# Patient Record
Sex: FEMALE | Race: WHITE | Employment: OTHER | URBAN - METROPOLITAN AREA
[De-identification: names, ages, dates, MRNs, and addresses within clinical notes are randomized per-mention and may not be internally consistent; named-entity substitution may affect disease eponyms.]

---

## 2017-01-03 ENCOUNTER — OFFICE VISIT (OUTPATIENT)
Dept: INTERNAL MEDICINE CLINIC | Age: 82
End: 2017-01-03

## 2017-01-03 VITALS
BODY MASS INDEX: 28.16 KG/M2 | RESPIRATION RATE: 18 BRPM | OXYGEN SATURATION: 97 % | WEIGHT: 169 LBS | DIASTOLIC BLOOD PRESSURE: 83 MMHG | HEIGHT: 65 IN | HEART RATE: 72 BPM | SYSTOLIC BLOOD PRESSURE: 163 MMHG | TEMPERATURE: 97.4 F

## 2017-01-03 DIAGNOSIS — R55 SYNCOPE, UNSPECIFIED SYNCOPE TYPE: Primary | ICD-10-CM

## 2017-01-03 DIAGNOSIS — M25.512 ACUTE PAIN OF LEFT SHOULDER: Primary | ICD-10-CM

## 2017-01-03 DIAGNOSIS — M25.512 LEFT SHOULDER PAIN, UNSPECIFIED CHRONICITY: ICD-10-CM

## 2017-01-03 RX ORDER — TRAMADOL HYDROCHLORIDE 50 MG/1
50 TABLET ORAL
Qty: 180 TAB | Refills: 0 | Status: SHIPPED | OUTPATIENT
Start: 2017-01-03 | End: 2018-01-01

## 2017-01-03 RX ORDER — ESCITALOPRAM OXALATE 10 MG/1
TABLET ORAL
Qty: 90 TAB | Refills: 3 | Status: SHIPPED | OUTPATIENT
Start: 2017-01-03 | End: 2017-09-25 | Stop reason: SDUPTHER

## 2017-01-03 NOTE — PROGRESS NOTES
SUBJECTIVE  Ms. Dat Durand presents today acutely for     Chief Complaint   Patient presents with   [facility] Follow Up     pt here today [facility] fainting in the floor and vomiting, pain behind L shoulder radiating down into L arm and broken to L side       She was admitted for syncopal episode:     Ms. Dat Durand is a patient of mine who presented with the problems above. See the initial H and P for full details.      Breana Miramontes is a 80 y.o.  female with PMHx of HTN, HLD, dementia, colon CA, and arrhythmia, and PSHx of pacemaker placed, presenting per EMS to ED for evaluation of lightheadedness with two episodes of syncope PTA. Daughter states pt's caregiver reports pt c/o lightheadedness, was helped to a chair, then had GLF to the floor s/p a syncopal episode. Caregiver endorses pt then had subsequent lightheadedness followed by another syncopal episode, resulting in an additional GLF to the floor. Caregiver reports the pt hit her head during both falls. Daughter states pt has c/o left UE pain since the falls. Daughter reports pt also fell one week ago with residual left-sided facial ecchymosis but no complaints of pain, so she wasn't evaluated. Daughter endorses pt had episodes of lightheadedness prior to pacemaker ([employer]) placement. She reports pt is followed by Cardiologist, Dr. Joel De La O. Daughter denies pt has history of a hear murmur. Pt denies diarrhea, CP, SOB, and head pain.    At this time patient is lying in bed has multiple bruises in different stages, apparently fell last week and again fell today, is been having recurrent episodes of syncope, but also vomiting si possible that this may be the cause of these syncope, denies chest pain, no SOB, no fever, no diarrhea, no cough, no urinary symptoms, no other associated symptoms. We were asked to admit for work up and evaluation of the above problems.      She was admitted, and had the work up noted above. Her pacemaker was interrogated and no abnormalities noted. Echo is reportedly normal. Cardiology has seen her and suggests that work up for her syncope could include lexiscan. Likely this was vasovagal in etiology, as she was lifting a heavy object when she passed out and fell.      She has had numerous syncopal spells, and prior work up for these have been negative. In discussion with Cardiology service, we will likely need to stop her HCTZ, and allow her BP to run on the high side, in order to avoid future syncopal episodes. We will also start low dose toprol. Now, doing well, except for shoulder pain on L side, near shoulder blade. This has been painful since the collapse. Her bruising has improved. She denies SOB, CP, Palpitations. She denies any syncope, LH, dizziness. She has appt coming up with Dr. Mae Lance next week. Past Medical History   Diagnosis Date    Arthritis     Back pain 2/16/2011    Cancer ([facility] [address] [facility] 1993     colon    Chronic pain      in her back    Dementia 2011     Mild; Neuropsych testing with Dr. David Baig in 2011    Dementia 7/21/2011    Epigastric pain 2/16/2011    Essential hypertension     Essential hypertension 12/4/2015    Hyperlipidemia 2/16/2011    Hypertension 2/16/2011    Hypothyroidism 2/16/2011     s/p VALDES in 1998 and 1999 for hyperthyroidism    Neuropathy 2/16/2011    Orthostasis 7/6/2012    Orthostasis 7/6/2012    Osteoporosis 2/16/2011    Pacemaker     Persistent disorder of initiating or maintaining sleep 4/22/2015    S/P cardiac pacemaker procedure 8/24/2011    SSS (sick sinus syndrome) ([facility] [address] [facility] 8/20/2011    Syncope     Syncope and collapse 8/19/2011    Urinary frequency 2/16/2011       SH: She reports that she has never smoked. She has never used smokeless tobacco. She reports that she does not drink alcohol or use illicit drugs. ROS: Complete review of systems was performed and is otherwise unremarkable except as noted elsewhere. OBJECTIVE  Visit Vitals    /83 (BP 1 Location: Left arm, BP Patient Position: Sitting)    Pulse 72    Temp 97.4 °F (36.3 °C) (Oral)    Resp 18    Ht 5' 5\" (1.651 m)    Wt 169 lb (76.7 kg)    SpO2 97%    BMI 28.12 kg/m2     Gen: Pleasant 80 y.o.  female in NAD.   HEENT: PERRLA. EOMI. OP moist and pink.  Neck: Supple.  No LAD.  HEART: RRR, No M/G/R.   LUNGS: CTAB No W/R.   ABDOMEN: S, NT, ND, BS+.   EXTREMITIES: Warm. No C/C/E. MUSCULOSKELETAL: Normal ROM, muscle strength 5/5 all groups. NEURO: Alert and oriented x 3.  Cranial nerves grossly intact.  No focal sensory or motor deficits noted. SKIN: Warm. Dry. No rashes or other lesions noted. XR abdomen: Nonobstructive bowel gas pattern.      XR shoulder left: No acute findings.      XR L humerus and L elbow: No acute findings.      XR ribs: Anterolateral left ninth rib fracture.     CT Head: No acute findings.      CT Maxillofacial: No acute fracture.      CT cervical spine: No acute fracture or dislocation. Multilevel disc and facet degenerative change with multilevel mild to moderate canal and foraminal stenoses. There is no evidence of lytic or blastic metastasis in the cervical region.      Pacemaker interrogation: No abnormalities.      Carotid duplex: 1. <50% stenosis in the right internal carotid artery. 2. 50-69% stenosis in the left internal carotid artery (Low end of range). 3. No significant stenosis in the external carotid arteries bilaterally. ASSESSMENT / PLAN    ICD-10-CM ICD-9-CM    1. Syncope, unspecified syncope type: No events since hospital.  R55 780.2 F/u as per Dr. Caresse Severin   2. Left shoulder pain, unspecified chronicity--due to syncope and collapse M25.512 719.41 XR SCAPULA LT      REFERRAL TO ORTHOPEDIC SURGERY       I have reviewed with the patient details of the assessment and plan and all questions were answered. Relevant patient education was performed. Follow-up Disposition: Keep appt in April.

## 2017-01-03 NOTE — TELEPHONE ENCOUNTER
Script for tramadol has been faxed to [facility] at [phone], approved fax confirmation received.

## 2017-01-03 NOTE — MR AVS SNAPSHOT
Visit Information Date & Time Provider Department Dept. Phone Encounter #  
 1/3/2017 10:45 AM Blanca Sampson, [address] [place] [address] [id number] Your Appointments 1/13/2017  1:15 PM  
Follow Up with Fern Khan MD  
[facility] Cardiology Associate [address]) [address] [place] [address] [address] [facility] [address]  
[phone]  
  
   
 [address] [place] [address] [address] [facility] [address]  
  
    
 1/13/2017  2:00 PM  
ROUTINE CARE with Blanca Sampson MD  
[address]) Appt Note: re evalution [address] [address]  
[phone]  
  
   
 [address] [facility] [address] [facility]  
  
    
 3/27/2017  9:30 AM  
6 MONTH with Fern Khan, 1024 [facility] Cardiology Associate [address]) [address] [place] [address] [address] [facility] [address]  
[phone]  
  
   
 [address] [address]  
  
    
 4/27/2017 10:30 AM  
ROUTINE CARE with Blanca Sampson MD  
[facility] [address] [place] [address]) Appt Note: 6 month follow up htn  
 [address] [facility]  
[phone] 5/18/2017  9:10 AM  
Follow Up with Ivania Bledsoe MD  
[facility] [address] [place] [address]) Appt Note: f/u   thyroid        5 month  
 [address] [facility] [address] [id number] [address] [place] [address] Upcoming Health Maintenance Date Due Pneumococcal 65+ Low/Medium Risk (2 of 2 - PPSV23) 10/1/2015 GLAUCOMA SCREENING Q2Y 7/29/2016 MEDICARE YEARLY EXAM 12/4/2016 DTaP/Tdap/Td series (2 - Td) 10/22/2025 Allergies as of 1/3/2017  Review Complete On: 1/3/2017 By: John Pantoja Severity Noted Reaction Type Reactions Amoxicillin  02/16/2011    Unknown (comments) Aricept [Donepezil]  01/25/2012    Other (comments) Bad dreams. Augmentin [Amoxicillin-pot Clavulanate]  02/16/2011    Unknown (comments) Pcn [Penicillins]  12/20/2016    Rash Zithromax [Azithromycin]  02/16/2011    Unknown (comments) Current Immunizations  Reviewed on 10/22/2015 Name Date Influenza Vaccine 10/22/2014 10:31 AM, 10/21/2013 Influenza Vaccine Lauri Silvia) 10/22/2015 Influenza Vaccine (Quad) PF 10/27/2016 Influenza Vaccine Split 10/9/2012, 10/3/2011 Influenza Vaccine Whole 10/1/2010 Pneumococcal Vaccine (Unspecified Type) 10/1/2010 Tdap 10/22/2015 Not reviewed this visit You Were Diagnosed With   
  
 Codes Comments Syncope, unspecified syncope type    -  Primary ICD-10-CM: R55 
ICD-9-CM: 780.2 Left shoulder pain, unspecified chronicity     ICD-10-CM: M25.512 ICD-9-CM: 719.41 Vitals BP Pulse Temp Resp Height(growth percentile) Weight(growth percentile) 163/83 (BP 1 Location: Left arm, BP Patient Position: Sitting) 72 97.4 °F (36.3 °C) (Oral) 18 5' 5\" (1.651 m) 169 lb (76.7 kg) SpO2 BMI OB Status Smoking Status 97% 28.12 kg/m2 Postmenopausal Never Smoker BMI and BSA Data Body Mass Index Body Surface Area  
 28.12 kg/m 2 1.88 m 2 Preferred Pharmacy Pharmacy Name Phone [facility] [address] [place] [address] [phone] Your Updated Medication List  
  
   
This list is accurate as of: 1/3/17 11:41 AM.  Always use your most recent med list.  
  
  
  
  
 alendronate 70 mg tablet Commonly known as:  FOSAMAX Take 1 Tab by mouth every Sunday. Mail order Rx.  
  
 aspirin delayed-release 81 mg tablet Take 81 mg by mouth daily. atorvastatin 10 mg tablet Commonly known as:  LIPITOR Take 1 Tab by mouth daily. calcium-cholecalciferol (D3) tablet Commonly known as:  CALTRATE 600+D Take 2 Tabs by mouth daily. cloNIDine HCl 0.1 mg tablet Commonly known as:  CATAPRES Take 1 Tab by mouth two (2) times a day. Take if bp > 170/90 as needed  
  
 co-enzyme Q-10 100 mg capsule Commonly known as:  CO Q-10 Take 100 mg by mouth daily. escitalopram oxalate 10 mg tablet Commonly known as:  [place] Take 1 Tab by mouth daily. gabapentin 300 mg capsule Commonly known as:  NEURONTIN Take 2 Caps by mouth three (3) times daily. Glucosamine &Chondroit-MV-Min3 857-826-60-0.5 mg Tab Take 1 Tab by mouth two (2) times a day. latanoprost 0.005 % ophthalmic solution Commonly known as:  XALATAN  
INSTILL 1 DROP IN BOTH EYES EVERY NIGHT AT BEDTIME  
  
 losartan 100 mg tablet Commonly known as:  COZAAR Take 1 Tab by mouth daily. MIRALAX PO Take  by mouth. 1-2  tsp daily OMEGA 3 FISH OIL PO Take 1 Cap by mouth two (2) times a day. SYNTHROID 75 mcg tablet Generic drug:  levothyroxine Take 1 tablet by mouth  daily before breakfast  
  
 traMADol 50 mg tablet Commonly known as:  ULTRAM  
Take 1 Tab by mouth every six (6) hours as needed for Pain.  
  
 vit B Cmplx 3-FA-Vit C-Biotin 1- mg-mg-mcg tablet Commonly known as:  NEPHRO BANDAR RX Take 1 Tab by mouth daily. To-Do List   
 01/03/2017 Imaging:  XR SCAPULA LT Referral Information Referral ID Referred By Referred To  
  
 [id number] Jaun LOWERY MD   
   [address] Phone: [phone] Fax: [phone] Visits Status Start Date End Date 1 New Request 1/3/17 1/3/18 If your referral has a status of pending review or denied, additional information will be sent to support the outcome of this decision. Introducing [facility]! Dear Janes Hardin: 
Thank you for requesting a [employer] account. Our records indicate that you already have an active [employer] account.   You can access your account anytime at [email or web address] Did you know that you can access your hospital and ER discharge instructions at any time in [employer]enty? You can also review all of your test results from your hospital stay or ER visit. Additional Information If you have questions, please visit the Frequently Asked Questions section of the [employer] website at [email or web address] Remember, Cmxt[employer]enty is NOT to be used for urgent needs. For medical emergencies, dial 911. Now available from your iPhone and Android! Please provide this summary of care documentation to your next provider. Your primary care clinician is listed as [place]. If you have any questions after today's visit, please call [phone].

## 2017-01-03 NOTE — PROGRESS NOTES
1. Have you been to the ER, urgent care clinic since your last visit? Hospitalized since your last visit? yes      2. Have you seen or consulted any other health care providers outside of the [address] [place] [address] since your last visit? Include any pap smears or colon screening.  no

## 2017-01-04 ENCOUNTER — TELEPHONE (OUTPATIENT)
Dept: INTERNAL MEDICINE CLINIC | Age: 82
End: 2017-01-04

## 2017-01-04 NOTE — TELEPHONE ENCOUNTER
[facility] is requesting to decrease visits or discharge as pt is doing very well. Pt has skilled nursing.

## 2017-01-13 ENCOUNTER — OFFICE VISIT (OUTPATIENT)
Dept: CARDIOLOGY CLINIC | Age: 82
End: 2017-01-13

## 2017-01-13 VITALS
HEART RATE: 68 BPM | OXYGEN SATURATION: 94 % | HEIGHT: 65 IN | RESPIRATION RATE: 18 BRPM | WEIGHT: 171 LBS | BODY MASS INDEX: 28.49 KG/M2 | DIASTOLIC BLOOD PRESSURE: 70 MMHG | SYSTOLIC BLOOD PRESSURE: 114 MMHG

## 2017-01-13 DIAGNOSIS — F41.1 GENERALIZED ANXIETY DISORDER: ICD-10-CM

## 2017-01-13 DIAGNOSIS — I10 ESSENTIAL HYPERTENSION: ICD-10-CM

## 2017-01-13 DIAGNOSIS — Z95.0 CARDIAC PACEMAKER IN SITU: ICD-10-CM

## 2017-01-13 DIAGNOSIS — I95.1 SYNCOPE DUE TO ORTHOSTATIC HYPOTENSION: Primary | ICD-10-CM

## 2017-01-13 DIAGNOSIS — F32.A DEPRESSION, UNSPECIFIED DEPRESSION TYPE: ICD-10-CM

## 2017-01-15 NOTE — PROGRESS NOTES
[address] [facility]        [phone]                             NEW PATIENT HPI/FOLLOW-UP    NAME:  Jarrod Bernstein   :   3/6/1932   MRN:   [id number]   PCP:  Lola Christina MD           Subjective: The patient is a 80y.o. year old female  who returns for a routine follow-up. Since the last visit, patient reports being hospitalized for orthostatic syncope. Meds were readjusted and no recurrence. Denies change in exercise tolerance, chest pain, edema, medication intolerance, palpitations, shortness of breath, PND/orthopnea wheezing, sputum. Doing satisfactorily. Review of Systems  General: Pt denies excessive weight gain or loss. Pt is able to conduct ADL's. Respiratory: Denies shortness of breath, CHOW, wheezing or stridor.   Cardiovascular: Denies precordial pain, palpitations, edema or PND  Gastrointestinal: Denies poor appetite, indigestion, abdominal pain or blood in stool  Peripheral vascular: Denies claudication, leg cramps  Neuropsychiatric: Denies paresthesias,tingling,numbness,anxiety,depression,fatigue  Musculoskeletal: Denies pain,tenderness, soreness,swelling      Past Medical History   Diagnosis Date    Arthritis     Back pain 2011    Cancer [facility] [address] 1993     colon    Chronic pain      in her back    Dementia      Mild; Neuropsych testing with Dr. Sultana Carnes in     Dementia 2011    Epigastric pain 2011    Essential hypertension     Essential hypertension 2015    Hyperlipidemia 2011    Hypertension 2011    Hypothyroidism 2011     s/p VALDES in  and  for hyperthyroidism    Neuropathy 2011    Orthostasis 2012    Orthostasis 2012    Osteoporosis 2011    Pacemaker     Persistent disorder of initiating or maintaining sleep 2015    S/P cardiac pacemaker procedure 2011    SSS (sick sinus syndrome) ([facility] 2011    Syncope     Syncope and collapse 2011    Urinary frequency 2/16/2011     Patient Active Problem List    Diagnosis Date Noted    Stenosis of both internal carotid arteries 12/16/2016    Syncope 12/15/2016    Hypokalemia 12/15/2016    Essential hypertension 12/04/2015    Persistent disorder of initiating or maintaining sleep 04/22/2015    Anxiety disorder 12/04/2012    Syncope and collapse 07/11/2012    Syncope due to orthostatic hypotension 07/11/2012    Orthostasis 07/06/2012    Essential hypertension, benign 06/01/2012    Mobitz (type) II atrioventricular block 06/01/2012    Cardiac pacemaker in situ 06/01/2012    DJD (degenerative joint disease) of knee 01/25/2012    S/P cardiac pacemaker procedure 08/24/2011    [facility] 08/20/2011    Depression 07/21/2011    Dementia 07/21/2011    Personal history of colon cancer 06/23/2011    Epigastric pain 02/16/2011    Hyperlipidemia 02/16/2011    Neuropathy 02/16/2011    Acquired hypothyroidism 02/16/2011    Urinary frequency 02/16/2011    Osteoporosis 02/16/2011      Past Surgical History   Procedure Laterality Date    Pr enteroscopy > 2nd prtn ileum control bleeding  6/23/2011          Hx gi  1993     colon resection    Hx heent       tonsillectomy in 1950    Hx pacemaker       Allergies   Allergen Reactions    Amoxicillin Unknown (comments)    Aricept [Donepezil] Other (comments)     Bad dreams.  Augmentin [Amoxicillin-Pot Clavulanate] Unknown (comments)    Pcn [Penicillins] Rash    Zithromax [Azithromycin] Unknown (comments)      Family History   Problem Relation Age of Onset    Diabetes Mother     Thyroid Disease Mother     Heart Disease Mother     Hypertension Father     Heart Disease Father     Diabetes Sister     Diabetes Brother       Social History     Social History    Marital status:      Spouse name: N/A    Number of children: N/A    Years of education: N/A     Occupational History    Not on file.      Social History Main Topics  Smoking status: Never Smoker    Smokeless tobacco: Never Used    Alcohol use No    Drug use: No    Sexual activity: Not on file     Other Topics Concern    Not on file     Social History Narrative    Lives in [place] with  of 54 years. Has a son and daughter. Used to work as a  for [employer] and at [employer] firm. Likes to garden and craft. Current Outpatient Prescriptions   Medication Sig    escitalopram oxalate (LEXAPRO) 10 mg tablet Take 1 tablet by mouth  daily    traMADol (ULTRAM) 50 mg tablet Take 1 Tab by mouth every six (6) hours as needed for Pain.  losartan (COZAAR) 100 mg tablet Take 1 Tab by mouth daily.  atorvastatin (LIPITOR) 10 mg tablet Take 1 Tab by mouth daily.  SYNTHROID 75 mcg tablet Take 1 tablet by mouth  daily before breakfast    gabapentin (NEURONTIN) 300 mg capsule Take 2 Caps by mouth three (3) times daily. (Patient taking differently: Take  by mouth. Two Capsules in the morning and One Capsule in the Evening)    alendronate (FOSAMAX) 70 mg tablet Take 1 Tab by mouth every Sunday. Mail order Rx.  co-enzyme Q-10 (CO Q-10) 100 mg capsule Take 100 mg by mouth daily.  Glucosamine &Chondroit-MV-Min3 149-921-23-0.5 mg tab Take 1 Tab by mouth two (2) times a day.  calcium-cholecalciferol, D3, (CALTRATE 600+D) tablet Take 2 Tabs by mouth daily.  vit B Cmplx 3-FA-Vit C-Biotin (NEPHRO BANDAR RX) 1- mg-mg-mcg tablet Take 1 Tab by mouth daily.  OMEGA-3 FATTY ACIDS/FISH OIL (OMEGA 3 FISH OIL PO) Take 1 Cap by mouth two (2) times a day.  POLYETHYLENE GLYCOL 3350 (MIRALAX PO) Take  by mouth. 1-2   tsp daily    aspirin delayed-release 81 mg tablet Take 81 mg by mouth daily.  latanoprost (XALATAN) 0.005 % ophthalmic solution INSTILL 1 DROP IN BOTH EYES EVERY NIGHT AT BEDTIME    cloNIDine HCl (CATAPRES) 0.1 mg tablet Take 1 Tab by mouth two (2) times a day.  Take if bp > 170/90 as needed     No current facility-administered medications for this visit. I have reviewed the nurses notes, vitals, problem list, allergy list, medical history, family medical, social history and medications. Objective:     Physical Exam:     Vitals:    01/13/17 1254   BP: 114/70   Pulse: 68   Resp: 18   SpO2: 94%   Weight: 171 lb (77.6 kg)   Height: 5' 5\" (1.651 m)    Body mass index is 28.46 kg/(m^2). General: Well developed, in no acute distress. HEENT: No carotid bruits, no JVD, trach is midline. Heart:  Normal S1/S2 negative S3 or S4. Regular, no murmur, gallop or rub.   Respiratory: Clear bilaterally, no wheezing or rales  Abdomen:   Soft, non-tender, bowel sounds are active.   Extremities:  No edema, normal cap refill, no cyanosis. Neuro: A&Ox3, speech clear, gait stable. Skin: Skin color is normal. No rashes or lesions. No diaphoresis. Vascular: 2+ pulses symmetric in all extremities        Data Review:       Cardiographics:    Cardiology Labs:    Results for orders placed or performed during the hospital encounter of 12/15/16   EKG, 12 LEAD, INITIAL   Result Value Ref Range    Ventricular Rate 67 BPM    Atrial Rate 67 BPM    P-R Interval 202 ms    QRS Duration 180 ms    Q-T Interval 470 ms    QTC Calculation (Bezet) 496 ms    Calculated P Axis 44 degrees    Calculated R Axis -46 degrees    Calculated T Axis 97 degrees    Diagnosis       Atrial-sensed ventricular-paced rhythm  When compared with ECG of 04-JAN-2016 20:04,  premature ventricular complexes are no longer present  Vent.  rate has decreased BY   9 BPM  Confirmed by Placido Ahumada ([id number]) on 12/16/2016 7:50:07 AM         Lab Results   Component Value Date/Time    Cholesterol, total 159 12/16/2016 03:01 AM    HDL Cholesterol 63 12/16/2016 03:01 AM    LDL, calculated 80.8 12/16/2016 03:01 AM    Triglyceride 76 12/16/2016 03:01 AM    CHOL/HDL Ratio 2.5 12/16/2016 03:01 AM       Lab Results   Component Value Date/Time    Sodium 137 12/17/2016 03:06 AM    Potassium 4.3 12/17/2016 03:06 AM Chloride 102 12/17/2016 03:06 AM    CO2 26 12/17/2016 03:06 AM    Anion gap 9 12/17/2016 03:06 AM    Glucose 106 12/17/2016 03:06 AM    BUN 15 12/17/2016 03:06 AM    Creatinine 0.76 12/17/2016 03:06 AM    BUN/Creatinine ratio 20 12/17/2016 03:06 AM    GFR est AA >60 12/17/2016 03:06 AM    GFR est non-AA >60 12/17/2016 03:06 AM    Calcium 8.3 12/17/2016 03:06 AM    Bilirubin, total 0.4 12/17/2016 03:06 AM    ALT 23 12/17/2016 03:06 AM    AST 25 12/17/2016 03:06 AM    Alk. phosphatase 64 12/17/2016 03:06 AM    Protein, total 6.0 12/17/2016 03:06 AM    Albumin 3.1 12/17/2016 03:06 AM    Globulin 2.9 12/17/2016 03:06 AM    A-G Ratio 1.1 12/17/2016 03:06 AM          Assessment:       ICD-10-CM ICD-9-CM    1. Syncope due to orthostatic hypotension I95.1 458.0    2. Essential hypertension I10 401.9 CANCELED: AMB POC EKG ROUTINE W/ 12 LEADS, INTER & REP   3. Cardiac pacemaker in situ Z95.0 V45.01    4. Depression, unspecified depression type F32.9 311    5. Generalized anxiety disorder F41.1 300.02          Discussion: Patient presents at this time stable from a cardiac perspective. No recurrent syncope. Pleased with present cardiac status. Plan: 1. Continue same meds. Lipid profile and labs followed by PCP. 2.Encouraged to exercise to tolerance, lose weight and follow low fat, low cholesterol, low sodium predominantly Plant-based (consider Mediterranean) diet. Call with questions or concerns. Will follow up any test results by phone and/or f/u here in office if needed. Andrei Gonzales 3.Follow up: 6 months    I have discussed the diagnosis with the patient and the intended plan as seen in the above orders. The patient has received an after-visit summary and questions were answered concerning future plans. I have discussed any concerning medication side effects and warnings with the patient as well.     Manuel Beach MD  1/15/2017

## 2017-02-16 DIAGNOSIS — M81.0 OSTEOPOROSIS: ICD-10-CM

## 2017-02-16 RX ORDER — ALENDRONATE SODIUM 70 MG/1
TABLET ORAL
Qty: 12 TAB | Refills: 3 | Status: SHIPPED | OUTPATIENT
Start: 2017-02-16 | End: 2018-01-11 | Stop reason: SDUPTHER

## 2017-03-06 ENCOUNTER — HOSPITAL ENCOUNTER (EMERGENCY)
Age: 82
Discharge: HOME | End: 2017-03-06
Attending: EMERGENCY MEDICINE
Payer: MEDICARE

## 2017-03-06 ENCOUNTER — APPOINTMENT (OUTPATIENT)
Dept: CT IMAGING | Age: 82
End: 2017-03-06
Attending: EMERGENCY MEDICINE
Payer: MEDICARE

## 2017-03-06 VITALS
HEIGHT: 65 IN | OXYGEN SATURATION: 98 % | TEMPERATURE: 98.5 F | RESPIRATION RATE: 16 BRPM | BODY MASS INDEX: 29.24 KG/M2 | DIASTOLIC BLOOD PRESSURE: 76 MMHG | HEART RATE: 67 BPM | WEIGHT: 175.49 LBS | SYSTOLIC BLOOD PRESSURE: 159 MMHG

## 2017-03-06 DIAGNOSIS — R55 SYNCOPE AND COLLAPSE: Primary | ICD-10-CM

## 2017-03-06 LAB
ALBUMIN SERPL BCP-MCNC: 3.6 G/DL (ref 3.5–5)
ALBUMIN/GLOB SERPL: 1.2 {RATIO} (ref 1.1–2.2)
ALP SERPL-CCNC: 60 U/L (ref 45–117)
ALT SERPL-CCNC: 19 U/L (ref 12–78)
ANION GAP BLD CALC-SCNC: 8 MMOL/L (ref 5–15)
APPEARANCE UR: ABNORMAL
AST SERPL W P-5'-P-CCNC: 18 U/L (ref 15–37)
BASOPHILS # BLD AUTO: 0 K/UL (ref 0–0.1)
BASOPHILS # BLD: 0 % (ref 0–1)
BILIRUB SERPL-MCNC: 0.4 MG/DL (ref 0.2–1)
BILIRUB UR QL: NEGATIVE
BUN SERPL-MCNC: 16 MG/DL (ref 6–20)
BUN/CREAT SERPL: 19 (ref 12–20)
CALCIUM SERPL-MCNC: 9.6 MG/DL (ref 8.5–10.1)
CHLORIDE SERPL-SCNC: 95 MMOL/L (ref 97–108)
CK SERPL-CCNC: 98 U/L (ref 26–192)
CO2 SERPL-SCNC: 31 MMOL/L (ref 21–32)
COLOR UR: ABNORMAL
CREAT SERPL-MCNC: 0.86 MG/DL (ref 0.55–1.02)
EOSINOPHIL # BLD: 0.1 K/UL (ref 0–0.4)
EOSINOPHIL NFR BLD: 1 % (ref 0–7)
ERYTHROCYTE [DISTWIDTH] IN BLOOD BY AUTOMATED COUNT: 13.5 % (ref 11.5–14.5)
GLOBULIN SER CALC-MCNC: 2.9 G/DL (ref 2–4)
GLUCOSE SERPL-MCNC: 88 MG/DL (ref 65–100)
GLUCOSE UR STRIP.AUTO-MCNC: NEGATIVE MG/DL
HCT VFR BLD AUTO: 40.2 % (ref 35–47)
HGB BLD-MCNC: 13.5 G/DL (ref 11.5–16)
HGB UR QL STRIP: NEGATIVE
KETONES UR QL STRIP.AUTO: NEGATIVE MG/DL
LEUKOCYTE ESTERASE UR QL STRIP.AUTO: NEGATIVE
LYMPHOCYTES # BLD AUTO: 21 % (ref 12–49)
LYMPHOCYTES # BLD: 1 K/UL (ref 0.8–3.5)
MCH RBC QN AUTO: 30.4 PG (ref 26–34)
MCHC RBC AUTO-ENTMCNC: 33.6 G/DL (ref 30–36.5)
MCV RBC AUTO: 90.5 FL (ref 80–99)
MONOCYTES # BLD: 0.4 K/UL (ref 0–1)
MONOCYTES NFR BLD AUTO: 9 % (ref 5–13)
NEUTS SEG # BLD: 3.2 K/UL (ref 1.8–8)
NEUTS SEG NFR BLD AUTO: 69 % (ref 32–75)
NITRITE UR QL STRIP.AUTO: NEGATIVE
PH UR STRIP: 7 [PH] (ref 5–8)
PLATELET # BLD AUTO: 196 K/UL (ref 150–400)
POTASSIUM SERPL-SCNC: 3.8 MMOL/L (ref 3.5–5.1)
PROT SERPL-MCNC: 6.5 G/DL (ref 6.4–8.2)
PROT UR STRIP-MCNC: NEGATIVE MG/DL
RBC # BLD AUTO: 4.44 M/UL (ref 3.8–5.2)
SODIUM SERPL-SCNC: 134 MMOL/L (ref 136–145)
SP GR UR REFRACTOMETRY: 1.01 (ref 1–1.03)
TROPONIN I SERPL-MCNC: <0.04 NG/ML
UROBILINOGEN UR QL STRIP.AUTO: 0.2 EU/DL (ref 0.2–1)
WBC # BLD AUTO: 4.7 K/UL (ref 3.6–11)

## 2017-03-06 PROCEDURE — 36415 COLL VENOUS BLD VENIPUNCTURE: CPT | Performed by: EMERGENCY MEDICINE

## 2017-03-06 PROCEDURE — 85025 COMPLETE CBC W/AUTO DIFF WBC: CPT | Performed by: EMERGENCY MEDICINE

## 2017-03-06 PROCEDURE — 70450 CT HEAD/BRAIN W/O DYE: CPT

## 2017-03-06 PROCEDURE — 81003 URINALYSIS AUTO W/O SCOPE: CPT | Performed by: EMERGENCY MEDICINE

## 2017-03-06 PROCEDURE — 99285 EMERGENCY DEPT VISIT HI MDM: CPT

## 2017-03-06 PROCEDURE — 80053 COMPREHEN METABOLIC PANEL: CPT | Performed by: EMERGENCY MEDICINE

## 2017-03-06 PROCEDURE — 82550 ASSAY OF CK (CPK): CPT | Performed by: EMERGENCY MEDICINE

## 2017-03-06 PROCEDURE — 93005 ELECTROCARDIOGRAM TRACING: CPT

## 2017-03-06 PROCEDURE — 84484 ASSAY OF TROPONIN QUANT: CPT | Performed by: EMERGENCY MEDICINE

## 2017-03-06 NOTE — ED NOTES
MD Dk Escamilla has reviewed discharge instructions with the patient. The patient and daughter has verbalized understanding. Pt discharged with written instructions. No further concerns at this time. IV removed.

## 2017-03-06 NOTE — DISCHARGE INSTRUCTIONS
Vasovagal Syncope: Care Instructions  Your Care Instructions    Vasovagal syncope (say \"yxm-bym-BDYIta SMITH-kuh-pee\") is sudden dizziness or fainting that can be set off by things such as pain, stress, fear, or trauma. You may sweat or feel lightheaded, sick to your stomach, or tingly. The problem causes the heart rate to slow and the blood vessels to widen, or dilate, for a short time. When this happens, blood pools in the lower body, and less blood goes to the brain. You can usually get relief by lying down with your legs raised (elevated). This helps more blood to flow to your brain and may help relieve symptoms like feeling dizzy. Some doctors may recommend a technique that involves tensing your fists and arms. This type of fainting is often easy to predict. For example, it happens to some people when they see blood or have to get a shot. They may feel symptoms before they faint. An episode of vasovagal syncope usually responds well to self-care. Other treatment often isn't needed. But if the fainting keeps happening, your doctor may suggest further treatments. Follow-up care is a key part of your treatment and safety. Be sure to make and go to all appointments, and call your doctor if you are having problems. It's also a good idea to know your test results and keep a list of the medicines you take. How can you care for yourself at home? · Drink plenty of fluids to prevent dehydration. If you have kidney, heart, or liver disease and have to limit fluids, talk with your doctor before you increase your fluid intake. · Try to avoid things that you think may set off vasovagal syncope. · Talk to your doctor about any medicines you take. Some medicines may increase the chance of this condition occurring. · If you feel symptoms, lie down with your legs raised. Talk to your doctor about what to do if your symptoms come back. When should you call for help?   Call 911 anytime you think you may need emergency care. For example, call if:  · You have symptoms of a heart problem. These may include:  ¨ Chest pain or pressure. ¨ Severe trouble breathing. ¨ A fast or irregular heartbeat. Watch closely for changes in your health, and be sure to contact your doctor if:  · You have more episodes of fainting at home. · You do not get better as expected. Where can you learn more? Go to http://vikash-maria g.info/. Enter L754 in the search box to learn more about \"Vasovagal Syncope: Care Instructions. \"  Current as of: May 27, 2016  Content Version: 11.1  © 5992-1602 [employer]. Care instructions adapted under license by [employer] (which disclaims liability or warranty for this information). If you have questions about a medical condition or this instruction, always ask your healthcare professional. [address] any warranty or liability for your use of this information.

## 2017-03-06 NOTE — ED PROVIDER NOTES
HPI Comments: Tamia Perez is a 80 y.o. female with pertinent PMHx of HTN, HLD, hypothyroidism, dementia, CA, syncope, and pacemaker presenting via EMS to the ED due to a witnessed syncopal episode while standing, just PTA in the ED today. Pt's syncopal episode was witnessed by the caregiver, who caught her before she fell to the floor. Per EMS, the caregiver stated that the pt was \"talking funny\" before her syncopal episode, but pt denies remembering this speech difficulty or any other preceding symptoms. Pt notes current generalized weakness. Pt states that she ate breakfast and lunch normally today. Per EMS, pt was A&O x 4 on their arrival. Per EMS, pt's blood pressure was 180/110. Pt's daughter states that the pt has experienced syncopal episodes in the past, which were stopped after pacemaker placement. Pt had a syncopal in December 2016, which was after pacemaker placement, and had a normal work up in the ED. Pt's only finding at that time was slight dehydration. Today's syncopal episode is the pt's 3rd syncopal episode since December 2016. Pt specifically denies any chest pain, SOB, dizziness, lightheadedness, headache, abdominal pain, urinary symptoms, N/V/D or fevers/chills. PCP: Ramon Falk MD  Cardiologist: Dr. Daphne Ellis Hx: - tobacco use, - alcohol use, - illicit drug use    There are no other complaints, changes, or physical findings at this time. The history is provided by the patient. No  was used.         Past Medical History:   Diagnosis Date    Arthritis     Back pain 2/16/2011    Cancer [facility]) 1993    colon    Chronic pain     in her back    Dementia 2011    Mild; Neuropsych testing with Dr. 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    S/P cardiac pacemaker procedure 8/24/2011    SSS (sick sinus syndrome) ([facility] [address] 8/20/2011    Syncope     Syncope and collapse 8/19/2011    Urinary frequency 2/16/2011       Past Surgical History:   Procedure Laterality Date    HX GI  1993    colon resection    HX HEENT      tonsillectomy in 1950    HX PACEMAKER      [place] ENTEROSCOPY > 2ND PRTN ILEUM CONTROL BLEEDING  6/23/2011              Family History:   Problem Relation Age of Onset    Diabetes Mother     Thyroid Disease Mother     Heart Disease Mother     Hypertension Father     Heart Disease Father     Diabetes Sister     Diabetes Brother        Social History     Social History    Marital status:      Spouse name: N/A    Number of children: N/A    Years of education: N/A     Occupational History    Not on file. Social History Main Topics    Smoking status: Never Smoker    Smokeless tobacco: Never Used    Alcohol use No    Drug use: No    Sexual activity: Not on file     Other Topics Concern    Not on file     Social History Narrative    L[facility] in [facility] with  of 54 years. Has a son and daughter. Used to work as a  for [employer] and at [employer] firm. Likes to garden and craft. ALLERGIES: Amoxicillin; Aricept [donepezil]; Augmentin [amoxicillin-pot clavulanate]; Pcn [penicillins]; and Zithromax [azithromycin]    Review of Systems   Constitutional: Negative for chills and fever. HENT: Negative for congestion. Eyes: Negative. Respiratory: Negative for shortness of breath. Cardiovascular: Negative for chest pain. Gastrointestinal: Negative for abdominal distention, diarrhea, nausea and vomiting. Endocrine: Negative for heat intolerance. Genitourinary: Negative. Musculoskeletal: Negative for back pain. Skin: Negative for rash. Allergic/Immunologic: Negative for immunocompromised state. Neurological: Positive for syncope and weakness. Negative for dizziness, light-headedness and headaches. Hematological: Does not bruise/bleed easily. Psychiatric/Behavioral: Negative. All other systems reviewed and are negative. Vitals:    03/06/17 1620 03/06/17 1622 03/06/17 1624 03/06/17 1725   BP: 164/87 (!) 187/93 161/90 159/76   Pulse: 67 75 70 67   Resp: 18 17 17 16   Temp:       SpO2: 98% 100% 100% 98%   Weight:       Height:                Physical Exam   Constitutional: She is oriented to person, place, and time. She appears well-developed and well-nourished. No distress. HENT:   Head: Normocephalic and atraumatic. Eyes: EOM are normal.   No nystagmus   Neck: Normal range of motion. Neck supple. Cardiovascular: Normal rate, regular rhythm and normal heart sounds. Pulmonary/Chest: Effort normal and breath sounds normal. No respiratory distress. Abdominal: Soft. Bowel sounds are normal. She exhibits no mass. There is no tenderness. Musculoskeletal: Normal range of motion. She exhibits no edema. Neurological: She is alert and oriented to person, place, and time. Coordination normal.   No focal neurological deficits   Skin: Skin is warm and dry. Psychiatric: She has a normal mood and affect. Nursing note and vitals reviewed.        MDM  Number of Diagnoses or Management Options  Syncope and collapse:   Diagnosis management comments: DDx: syncope, dehydration, electrolyte abnormality, anemia, arrhythmia, UTI, TIA       Amount and/or Complexity of Data Reviewed  Clinical lab tests: ordered and reviewed  Tests in the radiology section of CPT®: ordered and reviewed  Tests in the medicine section of CPT®: ordered and reviewed  Review and summarize past medical records: yes  Discuss the patient with other providers: yes (Hospitalist)  Independent visualization of images, tracings, or specimens: yes    Patient Progress  Patient progress: stable    ED Course       Procedures   EKG interpretation: (Preliminary) at 1415  Rhythm: normal sinus rhythm; and regular . Rate (approx.): 68 bpm; Axis: normal; P wave: normal; QRS interval: normal ; ST/T wave: normal; Other findings: nonspecific intraventricular block. Written by Duane Knights Hollie Drummer, ED Scribe, as dictated by Yoselin Dowd MD.      CONSULT NOTE:   4:40 PM  Yoselin Dowd MD spoke with Dr. Mirta Portillo,   Specialty: Hospitalist  Discussed pt's hx, disposition, and available diagnostic and imaging results. Reviewed care plans. Dr. Mirta Portillo states that he admitted the pt in December 2016 and requests that we call the pt's PCP to see if he advises admission. Written by Kanchan Rosenthal, ED Scribe, as dictated by Yoselin Dowd MD.    Progress Note:  5:18 PM  Pt re-evaluated. Pt and family are in agreement with discharge at this time, as we were unable to get in touch with the pt's PCP. Written by Duane Knights Hollie Drummer, ED Scribe, as dictated by Yoselin Dowd MD.    LABORATORY TESTS:  Recent Results (from the past 12 hour(s))   EKG, 12 LEAD, INITIAL    Collection Time: 03/06/17  2:15 PM   Result Value Ref Range    Ventricular Rate 68 BPM    Atrial Rate 68 BPM    P-R Interval 202 ms    QRS Duration 184 ms    Q-T Interval 456 ms    QTC Calculation (Bezet) 484 ms    Calculated P Axis 13 degrees    Calculated R Axis 110 degrees    Calculated T Axis -26 degrees    Diagnosis       Normal sinus rhythm  Nonspecific intraventricular block  Abnormal ECG  When compared with ECG of 15-DEC-2016 16:02,  Sinus rhythm has replaced Electronic ventricular pacemaker     CBC WITH AUTOMATED DIFF    Collection Time: 03/06/17  3:12 PM   Result Value Ref Range    WBC 4.7 3.6 - 11.0 K/uL    RBC 4.44 3.80 - 5.20 M/uL    HGB 13.5 11.5 - 16.0 g/dL    HCT 40.2 35.0 - 47.0 %    MCV 90.5 80.0 - 99.0 FL    MCH 30.4 26.0 - 34.0 PG    MCHC 33.6 30.0 - 36.5 g/dL    RDW 13.5 11.5 - 14.5 %    PLATELET 476 150 - 400 K/uL    NEUTROPHILS 69 32 - 75 %    LYMPHOCYTES 21 12 - 49 %    MONOCYTES 9 5 - 13 %    EOSINOPHILS 1 0 - 7 %    BASOPHILS 0 0 - 1 %    ABS. NEUTROPHILS 3.2 1.8 - 8.0 K/UL    ABS. LYMPHOCYTES 1.0 0.8 - 3.5 K/UL    ABS. MONOCYTES 0.4 0.0 - 1.0 K/UL    ABS. EOSINOPHILS 0.1 0.0 - 0.4 K/UL    ABS. BASOPHILS 0.0 0.0 - 0.1 K/UL   METABOLIC PANEL, COMPREHENSIVE    Collection Time: 03/06/17  3:12 PM   Result Value Ref Range    Sodium 134 (L) 136 - 145 mmol/L    Potassium 3.8 3.5 - 5.1 mmol/L    Chloride 95 (L) 97 - 108 mmol/L    CO2 31 21 - 32 mmol/L    Anion gap 8 5 - 15 mmol/L    Glucose 88 65 - 100 mg/dL    BUN 16 6 - 20 MG/DL    Creatinine 0.86 0.55 - 1.02 MG/DL    BUN/Creatinine ratio 19 12 - 20      GFR est AA >60 >60 ml/min/1.73m2    GFR est non-AA >60 >60 ml/min/1.73m2    Calcium 9.6 8.5 - 10.1 MG/DL    Bilirubin, total 0.4 0.2 - 1.0 MG/DL    ALT (SGPT) 19 12 - 78 U/L    AST (SGOT) 18 15 - 37 U/L    Alk. phosphatase 60 45 - 117 U/L    Protein, total 6.5 6.4 - 8.2 g/dL    Albumin 3.6 3.5 - 5.0 g/dL    Globulin 2.9 2.0 - 4.0 g/dL    A-G Ratio 1.2 1.1 - 2.2     CK W/ REFLX CKMB    Collection Time: 03/06/17  3:12 PM   Result Value Ref Range    CK 98 26 - 192 U/L   TROPONIN I    Collection Time: 03/06/17  3:12 PM   Result Value Ref Range    Troponin-I, Qt. <0.04 <0.05 ng/mL   URINALYSIS W/ RFLX MICROSCOPIC    Collection Time: 03/06/17  4:36 PM   Result Value Ref Range    Color YELLOW/STRAW      Appearance CLOUDY (A) CLEAR      Specific gravity 1.009 1.003 - 1.030      pH (UA) 7.0 5.0 - 8.0      Protein NEGATIVE  NEG mg/dL    Glucose NEGATIVE  NEG mg/dL    Ketone NEGATIVE  NEG mg/dL    Bilirubin NEGATIVE  NEG      Blood NEGATIVE  NEG      Urobilinogen 0.2 0.2 - 1.0 EU/dL    Nitrites NEGATIVE  NEG      Leukocyte Esterase NEGATIVE  NEG         IMAGING RESULTS:  CT Results  (Last 48 hours)               03/06/17 1609  CT HEAD WO CONT Final result    Impression:  IMPRESSION: No acute abnormality and no change. Narrative:  EXAM:  CT HEAD WITHOUT CONTRAST       INDICATION: Altered mental status with syncope. COMPARISON: 12/15/2016. CONTRAST: None. TECHNIQUE: Unenhanced CT of the head was performed using 5 mm images. Brain and   bone windows were generated. Sagittal and coronal reformations were generated. CT dose reduction was achieved through use of a standardized protocol tailored   for this examination and automatic exposure control for dose modulation. CT dose   reduction was achieved through use of a standardized protocol tailored for this   examination and automatic exposure control for dose modulation. FINDINGS:   The ventricles and sulci are normal in size, shape and configuration and   midline. There is no significant white matter disease. There is no   intracranial hemorrhage. There is no extra-axial collection, mass, mass effect   or midline shift. The basilar cisterns are open. No acute infarct is   identified. The bone windows demonstrate no abnormalities. The visualized   portions of the paranasal sinuses and mastoid air cells are clear. IMPRESSION:  1. Syncope and collapse        PLAN:  1. Current Discharge Medication List      CONTINUE these medications which have NOT CHANGED    Details   alendronate (FOSAMAX) 70 mg tablet Take 1 tablet by mouth  every Sunday  Qty: 12 Tab, Refills: 3    Associated Diagnoses: Osteoporosis      escitalopram oxalate (LEXAPRO) 10 mg tablet Take 1 tablet by mouth  daily  Qty: 90 Tab, Refills: 3      traMADol (ULTRAM) 50 mg tablet Take 1 Tab by mouth every six (6) hours as needed for Pain. Qty: 180 Tab, Refills: 0      losartan (COZAAR) 100 mg tablet Take 1 Tab by mouth daily. Qty: 30 Tab, Refills: 11      atorvastatin (LIPITOR) 10 mg tablet Take 1 Tab by mouth daily.   Qty: 90 Tab, Refills: 3      SYNTHROID 75 mcg tablet Take 1 tablet by mouth  daily before breakfast  Qty: 90 Tab, Refills: 3      gabapentin (NEURONTIN) 300 mg capsule Take 2 Caps by mouth three (3) times daily. Qty: 540 Cap, Refills: 3      co-enzyme Q-10 (CO Q-10) 100 mg capsule Take 100 mg by mouth daily. Glucosamine &Chondroit-MV-Min3 676-291-16-0.5 mg tab Take 1 Tab by mouth two (2) times a day. calcium-cholecalciferol, D3, (CALTRATE 600+D) tablet Take 2 Tabs by mouth daily. vit B Cmplx 3-FA-Vit C-Biotin (NEPHRO BANDAR RX) 1- mg-mg-mcg tablet Take 1 Tab by mouth daily. OMEGA-3 FATTY ACIDS/FISH OIL (OMEGA 3 FISH OIL PO) Take 1 Cap by mouth two (2) times a day. POLYETHYLENE GLYCOL 3350 (MIRALAX PO) Take  by mouth. 1-2   tsp daily      aspirin delayed-release 81 mg tablet Take 81 mg by mouth daily. latanoprost (XALATAN) 0.005 % ophthalmic solution INSTILL 1 DROP IN BOTH EYES EVERY NIGHT AT BEDTIME  Refills: 12      cloNIDine HCl (CATAPRES) 0.1 mg tablet Take 1 Tab by mouth two (2) times a day. Take if bp > 170/90 as needed  Qty: 20 Tab, Refills: 0           2. Follow-up Information     Follow up With Details Comments [address] MD In 1 day  [address] [phone]      [facility] EMERGENCY DEPT  If symptoms worsen [address] [place] [address] Drive  [address] [facility]  [phone]        Return to ED if worse   DISCHARGE NOTE:  5:28 PM  The patient is ready for discharge. The patient's signs, symptoms, diagnosis, and discharge instructions have been discussed and the patient and/or family has conveyed their understanding. The patient and/or family is to follow up as recommended or return to the ER should their symptoms worsen. Plan has been discussed and the patient and/or family is in agreement. Written by Kaaren Luria Christel Bernheim, ED Scribe, as dictated by Francisco Conway MD.     Attestation: This note is prepared by Ernestina Ruelas.  Christel Bernheim, acting as Scribe for Francisco Conway MD.    Francisco Conway MD: The scribe's documentation has been prepared under my direction and personally reviewed by me in its entirety. I confirm that the note above accurately reflects all work, treatment, procedures, and medical decision making performed by me.

## 2017-03-06 NOTE — ED TRIAGE NOTES
Patient arrives after syncopal episode. Syncopal episode was witnessed by friend who reported that they were standing in the kitchen talking and she began having garbled, incomprehensible speech. Patient then passed out, patient was caught by friend and did not injure anything in the fall. By the time EMS arrived the patient was reported to be back to normal baseline and oriented x4. Patient currently is a&ox4. Denying any pain but reports feeling weak. No distress noted. Will continue to monitor.

## 2017-03-07 ENCOUNTER — PATIENT OUTREACH (OUTPATIENT)
Dept: INTERNAL MEDICINE CLINIC | Age: 82
End: 2017-03-07

## 2017-03-07 LAB
ATRIAL RATE: 68 BPM
CALCULATED P AXIS, ECG09: 13 DEGREES
CALCULATED R AXIS, ECG10: 110 DEGREES
CALCULATED T AXIS, ECG11: -26 DEGREES
DIAGNOSIS, 93000: NORMAL
P-R INTERVAL, ECG05: 202 MS
Q-T INTERVAL, ECG07: 456 MS
QRS DURATION, ECG06: 184 MS
QTC CALCULATION (BEZET), ECG08: 484 MS
VENTRICULAR RATE, ECG03: 68 BPM

## 2017-03-07 NOTE — PROGRESS NOTES
NNTOCIP Post Hospitalization       - Patient attended office visit with Dr. Vladimir Champagne (Cardiology) on 12/20/16.      - Patient attended [facility] appointment with Dr. Arely Sims on 1/3/17; previously scheduled [facility] appointment with Dr. Arely Sims on 12/27/16 is noted to have been cancelled. - Patient attended office visit with Dr. Ana Berry (Cardiology) on 1/13/17. To the best of this NN's knowledge, this patient had no additional ED visits or Hospital Admissions during 30 day ITZEL period following admission to [facility] 12/15/16-12/17/16. ITZEL period has ended. Post-Hospitalization Episode Resolved. This note will not be viewable in [address]

## 2017-03-07 NOTE — PROGRESS NOTES
[address] [facility] ED        NN follow-up    Patient referred to this NN via Referral from [facility] ED Report. Patient with ED visit to [facility] on 3/6/17 with diagnosis of Syncope and Collapse. Per notes, patient was discharged from ED to Home. New Presciption(s) at ED Discharge: N/A  Change(s) to existing Medication(s) at ED Discharge: N/A  Medication(s) discontinued at ED Discharge: N/A  Patient to follow-up with: Follow-up Information   Follow up With Details Comments [facility] MD Archie In 1 day  [address]   [address] [facility] [address]   [facility]   [phone]      [facility] EMERGENCY DEPT  If symptoms worsen [address]   [address] [facility]   [phone]         Patient's most recent Office Visit with PCP: 1/3/17; was advised to follow-up in April as scheduled. NN follow-up phone call  NN contacted the patient today to complete NN Post-ED discharge assessment. Two patient identifiers verified. NN introduced self to the patient, including NN role and purpose of NN follow-up phone call; patient verbalizes understanding. NN inquired about the patient's current condition and how the patient is feeling; patient states, \"I'm doing better. \"    Patient is alert and oriented x 4 (person, place, time/event, date) during this phone call. Patient denies dizziness, falls since ED visit. Patient reports that she uses a Cane with ambulation; reports that she also has a Walker for use if needed. Patient able to pick-up new medication(s) without difficulty: N/A  Questions/Concerns regarding new medication(s) and/or medication change(s): N/A        Patient expressed no questions, concerns or needs for this NN at this time. Patient verbalized understanding of all information discussed. NN contact information provided and patient advised to contact NN as needed. PLAN    -Patient to attend ED follow-up Appointment with Dr. Nely Joshi on 3/9/17.   Future Appointments Provider Mira Moreno   3/9/2017 9:45 AM Marcell Rainey [address]   4/27/2017 10:30 AM Marcell Rainey MD [address]   5/18/2017 9:10 AM Autumn Hayes MD [facility]   6/28/2017 2:15 PM Juanita Giron MD [facility] [address]       -Patient to contact this NN and/or PCP office with any questions/concerns.  -Notified of availability of PCP on-call physician after-hours and on the weekends for non-emergent/non-life threatening medical questions/concerns; patient verbalizes understanding. NN will route this encounter to Dr. Jie Phillips for notification/review. This note will not be viewable in [address]

## 2017-03-09 ENCOUNTER — OFFICE VISIT (OUTPATIENT)
Dept: INTERNAL MEDICINE CLINIC | Age: 82
End: 2017-03-09

## 2017-03-09 VITALS
SYSTOLIC BLOOD PRESSURE: 147 MMHG | HEIGHT: 65 IN | BODY MASS INDEX: 28.16 KG/M2 | HEART RATE: 62 BPM | WEIGHT: 169 LBS | TEMPERATURE: 97.4 F | OXYGEN SATURATION: 99 % | RESPIRATION RATE: 18 BRPM | DIASTOLIC BLOOD PRESSURE: 75 MMHG

## 2017-03-09 DIAGNOSIS — R55 SYNCOPE AND COLLAPSE: Primary | ICD-10-CM

## 2017-03-09 RX ORDER — LOSARTAN POTASSIUM 50 MG/1
50 TABLET ORAL DAILY
Qty: 30 TAB | Refills: 11 | Status: SHIPPED | OUTPATIENT
Start: 2017-03-09 | End: 2017-03-17 | Stop reason: SDUPTHER

## 2017-03-09 NOTE — PROGRESS NOTES
1. Have you been to the ER, urgent care clinic since your last visit? Hospitalized since your last visit? [facility] ER     2. Have you seen or consulted any other health care providers outside of the [address] [place] [address] since your last visit? Include any pap smears or colon screening.  no

## 2017-03-09 NOTE — PROGRESS NOTES
SUBJECTIVE  Ms. Kenny Bowles presents today acutely for     Chief Complaint   Patient presents with   [facility] Follow Up     [facility] on Monday and Tuesday; on Tuesday pt hit her head and vomiting; pt states that after she blacked out she dont remember       She was in ER on 3/6:   Kenny Bowles is a 80 y.o. female with pertinent PMHx of HTN, HLD, hypothyroidism, dementia, CA, syncope, and pacemaker presenting via EMS to the ED due to a witnessed syncopal episode while standing, just PTA in the ED today. Pt's syncopal episode was witnessed by the caregiver, who caught her before she fell to the floor. Per EMS, the caregiver stated that the pt was \"talking funny\" before her syncopal episode, but pt denies remembering this speech difficulty or any other preceding symptoms. Pt notes current generalized weakness. Pt states that she ate breakfast and lunch normally today. Per EMS, pt was A&O x 4 on their arrival. Per EMS, pt's blood pressure was 180/110. Pt's daughter states that the pt has experienced syncopal episodes in the past, which were stopped after pacemaker placement. Pt had a syncopal in December 2016, which was after pacemaker placement, and had a normal work up in the ED. Pt's only finding at that time was slight dehydration. Today's syncopal episode is the pt's 3rd syncopal episode since December 2016. Pt specifically denies any chest pain, SOB, dizziness, lightheadedness, headache, abdominal pain, urinary symptoms, N/V/D or fevers/chills.     Now, she is back home. She has had another episode since the ER visit. It is recalled from Dec 2016:   She was admitted, and had the work up noted above. Her pacemaker was interrogated and no abnormalities noted. Echo is reportedly normal. Cardiology has seen her and suggests that work up for her syncope could include lexiscan.  Likely this was vasovagal in etiology, as she was lifting a heavy object when she passed out and fell. She has had numerous syncopal spells, and prior work up for these have been negative. In discussion with Cardiology service, we will likely need to stop her HCTZ, and allow her BP to run on the high side, in order to avoid future syncopal episodes. We will also start low dose toprol. Past Medical History:   Diagnosis Date    Arthritis     Back pain 2/16/2011    Cancer ([facility] [address] 1993    colon    Chronic pain     in her back    Dementia 2011    Mild; Neuropsych testing with Dr. Vince Nino in 2011    Dementia 7/21/2011    Epigastric pain 2/16/2011    Essential hypertension     Essential hypertension 12/4/2015    Hyperlipidemia 2/16/2011    Hypertension 2/16/2011    Hypothyroidism 2/16/2011    s/p VALDES in 1998 and 1999 for hyperthyroidism    Neuropathy 2/16/2011    Orthostasis 7/6/2012    Orthostasis 7/6/2012    Osteoporosis 2/16/2011    Pacemaker     Persistent disorder of initiating or maintaining sleep 4/22/2015    S/P cardiac pacemaker procedure 8/24/2011    SSS (sick sinus syndrome) ([facility] 8/20/2011    Syncope     Syncope and collapse 8/19/2011    Urinary frequency 2/16/2011       SH:  reports that she has never smoked. She has never used smokeless tobacco. She reports that she does not drink alcohol or use illicit drugs. ROS: Complete review of systems was performed and is otherwise unremarkable except as noted elsewhere. OBJECTIVE  Visit Vitals    /75 (BP 1 Location: Left arm, BP Patient Position: Sitting)    Pulse 62    Temp 97.4 °F (36.3 °C) (Oral)    Resp 18    Ht 5' 5\" (1.651 m)    Wt 169 lb (76.7 kg)    SpO2 99%    BMI 28.12 kg/m2     Gen: Pleasant 80 y.o.  female in NAD.   HEENT: PERRLA. EOMI. OP moist and pink.  Neck: Supple.  No LAD.  HEART: RRR, No M/G/R.   LUNGS: CTAB No W/R.   ABDOMEN: S, NT, ND, BS+.   EXTREMITIES: Warm. No C/C/E. MUSCULOSKELETAL: Normal ROM, muscle strength 5/5 all groups.  NEURO: Alert and oriented x 3.  Cranial nerves grossly intact.  No focal sensory or motor deficits noted. SKIN: Warm. Dry. No rashes or other lesions noted. ASSESSMENT / PLAN    ICD-10-CM ICD-9-CM    1. Syncope and collapse R55 780.2 losartan (COZAAR) 50 mg tablet--cut back from 100 mg. Contact cardiologist.   Advised no driving per [place]. I have reviewed with the patient details of the assessment and plan and all questions were answered. Relevant patient education was performed. Follow-up Disposition:  Return if symptoms worsen or fail to improve.

## 2017-03-17 DIAGNOSIS — R55 SYNCOPE AND COLLAPSE: ICD-10-CM

## 2017-03-17 RX ORDER — LOSARTAN POTASSIUM 50 MG/1
50 TABLET ORAL DAILY
Qty: 90 TAB | Refills: 3 | Status: SHIPPED | OUTPATIENT
Start: 2017-03-17 | End: 2017-05-18 | Stop reason: SDUPTHER

## 2017-04-27 ENCOUNTER — OFFICE VISIT (OUTPATIENT)
Dept: INTERNAL MEDICINE CLINIC | Age: 82
End: 2017-04-27

## 2017-04-27 VITALS
BODY MASS INDEX: 28.29 KG/M2 | DIASTOLIC BLOOD PRESSURE: 79 MMHG | HEIGHT: 65 IN | WEIGHT: 169.8 LBS | OXYGEN SATURATION: 98 % | RESPIRATION RATE: 16 BRPM | SYSTOLIC BLOOD PRESSURE: 130 MMHG | HEART RATE: 63 BPM | TEMPERATURE: 98 F

## 2017-04-27 DIAGNOSIS — F03.90 DEMENTIA WITHOUT BEHAVIORAL DISTURBANCE, UNSPECIFIED DEMENTIA TYPE: ICD-10-CM

## 2017-04-27 DIAGNOSIS — R53.83 FATIGUE, UNSPECIFIED TYPE: ICD-10-CM

## 2017-04-27 DIAGNOSIS — I10 ESSENTIAL HYPERTENSION: ICD-10-CM

## 2017-04-27 DIAGNOSIS — Z00.00 ROUTINE GENERAL MEDICAL EXAMINATION AT A HEALTH CARE FACILITY: Primary | ICD-10-CM

## 2017-04-27 DIAGNOSIS — Z13.31 SCREENING FOR DEPRESSION: ICD-10-CM

## 2017-04-27 DIAGNOSIS — E03.9 ACQUIRED HYPOTHYROIDISM: ICD-10-CM

## 2017-04-27 DIAGNOSIS — Z13.39 SCREENING FOR ALCOHOLISM: ICD-10-CM

## 2017-04-27 DIAGNOSIS — R55 SYNCOPE AND COLLAPSE: ICD-10-CM

## 2017-04-27 NOTE — MR AVS SNAPSHOT
Visit Information Date & Time Provider Department Dept. Phone Encounter #  
 4/27/2017 10:30 AM Calderon Sy, [address] [facility] [phone] [id number] Follow-up Instructions Return in about 6 months (around 10/27/2017) for HTN. Your Appointments 5/18/2017  9:10 AM  
Follow Up with MD Miguel Martinez [facility] Appt Note: f/u   thyroid        5 month  
 [address] [facility] [address] [id number] [address]  
  
    
 6/28/2017  2:15 PM  
6 MONTH with Martha Kirkland, [address] [facility] [address] [facility]) Appt Note: . [address] [facility] [address]  
[phone]  
  
   
 [address] Upcoming Health Maintenance Date Due Pneumococcal 65+ Low/Medium Risk (2 of 2 - PPSV23) 10/1/2015 GLAUCOMA SCREENING Q2Y 7/29/2016 MEDICARE YEARLY EXAM 4/28/2018 DTaP/Tdap/Td series (2 - Td) 10/22/2025 Allergies as of 4/27/2017  Review Complete On: 4/27/2017 By: Calderon Sy MD  
  
 Severity Noted Reaction Type Reactions Amoxicillin  02/16/2011    Unknown (comments) Aricept [Donepezil]  01/25/2012    Other (comments) Bad dreams. Augmentin [Amoxicillin-pot Clavulanate]  02/16/2011    Unknown (comments) Pcn [Penicillins]  12/20/2016    Rash Zithromax [Azithromycin]  02/16/2011    Unknown (comments) Current Immunizations  Reviewed on 10/22/2015 Name Date Influenza Vaccine 10/22/2014 10:31 AM, 10/21/2013 Influenza Vaccine [facility]) 10/22/2015 Influenza Vaccine (Quad) PF 10/27/2016 Influenza Vaccine Split 10/9/2012, 10/3/2011 Influenza Vaccine Whole 10/1/2010 Pneumococcal Vaccine (Unspecified Type) 10/1/2010 Tdap 10/22/2015 Not reviewed this visit You Were Diagnosed With   
  
 Codes Comments Fatigue, unspecified type    -  Primary ICD-10-CM: R53.83 ICD-9-CM: 780.79 Essential hypertension     ICD-10-CM: I10 
ICD-9-CM: 401.9 Syncope and collapse     ICD-10-CM: R55 
ICD-9-CM: 780.2 Dementia without behavioral disturbance, unspecified dementia type     ICD-10-CM: F03.90 ICD-9-CM: 294.20 Acquired hypothyroidism     ICD-10-CM: E03.9 ICD-9-CM: 244.9 Routine general medical examination at a health care facility     ICD-10-CM: Z00.00 ICD-9-CM: V70.0 Screening for alcoholism     ICD-10-CM: Z13.89 ICD-9-CM: V79.1 Screening for depression     ICD-10-CM: Z13.89 ICD-9-CM: V79.0 Vitals BP Pulse Temp Resp Height(growth percentile) Weight(growth percentile) 130/79 (BP 1 Location: Left arm, BP Patient Position: Sitting) 63 98 °F (36.7 °C) (Oral) 16 5' 5\" (1.651 m) 169 lb 12.8 oz (77 kg) SpO2 BMI OB Status Smoking Status 98% 28.26 kg/m2 Postmenopausal Never Smoker Vitals History BMI and BSA Data Body Mass Index Body Surface Area  
 28.26 kg/m 2 1.88 m 2 Preferred Pharmacy Pharmacy Name Phone [address] [facility], [address] [place] [address] [address] Viridiana Ramos 134 Your Updated Medication List  
  
   
This list is accurate as of: 4/27/17 11:39 AM.  Always use your most recent med list.  
  
  
  
  
 alendronate 70 mg tablet Commonly known as:  FOSAMAX Take 1 tablet by mouth  every Sunday  
  
 aspirin delayed-release 81 mg tablet Take 81 mg by mouth daily. atorvastatin 10 mg tablet Commonly known as:  LIPITOR Take 1 Tab by mouth daily. calcium-cholecalciferol (D3) tablet Commonly known as:  CALTRATE 600+D Take 2 Tabs by mouth daily. cloNIDine HCl 0.1 mg tablet Commonly known as:  CATAPRES Take 1 Tab by mouth two (2) times a day. Take if bp > 170/90 as needed  
  
 co-enzyme Q-10 100 mg capsule Commonly known as:  CO Q-10 Take 100 mg by mouth daily. escitalopram oxalate 10 mg tablet Commonly known as:  Oletha Dubs Take 1 tablet by mouth  daily  
  
 gabapentin 300 mg capsule Commonly known as:  NEURONTIN Take 2 Caps by mouth three (3) times daily. Glucosamine &Chondroit-MV-Min3 157-358-92-0.5 mg Tab Take 1 Tab by mouth two (2) times a day. latanoprost 0.005 % ophthalmic solution Commonly known as:  XALATAN  
INSTILL 1 DROP IN BOTH EYES EVERY NIGHT AT BEDTIME  
  
 losartan 50 mg tablet Commonly known as:  COZAAR Take 1 Tab by mouth daily. MIRALAX PO Take  by mouth. 1-2  tsp daily OMEGA 3 FISH OIL PO Take 1 Cap by mouth two (2) times a day. SYNTHROID 75 mcg tablet Generic drug:  levothyroxine Take 1 tablet by mouth  daily before breakfast  
  
 traMADol 50 mg tablet Commonly known as:  ULTRAM  
Take 1 Tab by mouth every six (6) hours as needed for Pain.  
  
 vit B Cmplx 3-FA-Vit C-Biotin 1- mg-mg-mcg tablet Commonly known as:  NEPHRO BANDAR RX Take 1 Tab by mouth daily. We Performed the Following Shr[address]ha [address] [[id number] [facility]] Follow-up Instructions Return in about 6 months (around 10/27/2017) for HTN. Patient Instructions Lilliana Guerin Date 4/27/17 Medicare Part B Preventive Services Limitations Recommendation/Date completed if known Scheduled/ Next Due Bone Mass Measurement 
(age 72 & older, biennial) Requires diagnosis related to osteoporosis or estrogen deficiency. Biennial benefit unless patient has history of long-term glucocorticoid tx or baseline is needed because initial test was by other method Completed: 
1/28/16 - bone scan ordered by Dr. Fariba Viera Recommended every 2 years DUE: 
1/2018 Cardiovascular Screening Blood Tests (every 5 years) Total cholesterol, HDL, Triglycerides Order as a panel if possible Completed: 
12/2016 Recommended annually DUE: 
12/2017 Colorectal Cancer Screening 
-Fecal occult blood test (annual) -Flexible sigmoidoscopy (5y) 
-Screening colonoscopy (10y) -Barium Enema  Completed: 
 
 
 
Recommended every 10 years DUE: 
 
Per Dr. Helen Horan Counseling to Prevent Tobacco Use (up to 8 sessions per year) - Counseling greater than 3 and up to 10 minutes - Counseling greater than 10 minutes Patients must be asymptomatic of tobacco-related conditions to receive as preventive service  Keep up the good work! Diabetes Screening Tests (at least every 3 years, Medicare covers annually or at 6-month intervals for prediabetic patients) Fasting blood sugar (FBS) or glucose tolerance test (GTT) Patient must be diagnosed with one of the following: 
-Hypertension, Dyslipidemia, obesity, previous impaired FBS or GTT 
Or any two of the following: overweight, FH of diabetes, age ? 72, history of gestational diabetes, birth of baby weighing more than 9 pounds Completed: 
 
12/16/16 - normal A1c Recommended annually DUE: 
 
12/2017 Diabetes Self-Management Training (DSMT) (no USPSTF recommendation) Requires referral by treating physician for patient with diabetes or renal disease. 10 hours of initial DSMT session of no less than 30 minutes each in a continuous 12-month period. 2 hours of follow-up DSMT in subsequent years. Not indicated Glaucoma Screening (no USPSTF recommendation) Diabetes mellitus, family history, , age 48 or over,  American, age 72 or over Completed: 
 
Dr. Jazmyn Azevedo - annually Recommended annually DUE: 
 
July 2017 Human Immunodeficiency Virus (HIV) Screening (annually for increased risk patients) HIV-1 and HIV-2 by EIA, OG, rapid antibody test, or oral mucosa transudate Patient must be at increased risk for HIV infection per USPSTF guidelines or pregnant. Tests covered annually for patients at increased risk. Pregnant patients may receive up to 3 test during pregnancy. n/a Medical Nutrition Therapy (MNT) (for diabetes or renal disease not recommended schedule) Requires referral by treating physician for patient with diabetes or renal disease. Can be provided in same year as diabetes self-management training (DSMT), and CMS recommends medical nutrition therapy take place after DSMT. Up to 3 hours for initial year and 2 hours in subsequent years. n/a Seasonal Influenza Vaccination (annually)  Completed Recommended annually DUE every Fall Pneumococcal Vaccination (once after 65)  Completed Hepatitis B Vaccinations (if medium/high risk) Medium/high risk factors:  End-stage renal disease, Hemophiliacs who received Factor VIII or IX concentrates, Clients of institutions for the mentally retarded, Persons who live in the same house as a HepB virus carrier, Homosexual men, Illicit injectable drug abusers. N/a Screening Mammography (biennial age 54-69)? Annually (age 36 or over) Completed:  
2011 Recommended annually DUE: 
Not done at this time Screening Pap Tests and Pelvic Examination (up to age 79 and after 79 if unknown history or abnormal study last 10 years) Every 24 months except high risk Completed: 
 
 
 
Recommended every 2 years DUE: 
 
Not indicated at this time Ultrasound Screening for Abdominal Aortic Aneurysm (AAA) (once) Patient must be referred through IPPE and not have had a screening for abdominal aortic aneurysm before under Medicare. Limited to patients who meet one of the following criteria: 
- Men who are 73-68 years old and have smoked more than 100 cigarettes in their lifetime. 
-Anyone with a FH of AAA 
-Anyone recommended for screening by USPSTF Not covered by Medicare as preventive. Not indicated this time Thanks for coming in today. It was nice to spend some time with you. If you have any questions about your visit today, please call [phone] and ask for [facility]. Introducing [facility]! Dear Modesto Davis: 
Thank you for requesting a Prom[employer]ity account.   Our records indicate that you already have an active KAICORE account. You can access your account anytime at [email or web address]. [email or web address] Did you know that you can access your hospital and ER discharge instructions at any time in KAICORE? You can also review all of your test results from your hospital stay or ER visit. Additional Information If you have questions, please visit the Frequently Asked Questions section of the [employer] website at [email or web address] Remember, KAICORE is NOT to be used for urgent needs. For medical emergencies, dial 911. Now available from your iPhone and Android! Please provide this summary of care documentation to your next provider. Your primary care clinician is listed as [place]. If you have any questions after today's visit, please call [phone].

## 2017-04-27 NOTE — PROGRESS NOTES
This is a Subsequent Medicare Annual Wellness Visit providing Personalized Prevention Plan Services (PPPS) (Performed 12 months after initial AWV and PPPS )    I have reviewed the patient's medical history in detail and updated the computerized patient record. History     Past Medical History:   Diagnosis Date    Arthritis     Back pain 2/16/2011    Cancer ([facility] [address] 1993    colon    Chronic pain     in her back    Dementia 2011    Mild; Neuropsych testing with Dr. To Mix in 2011    Dementia 7/21/2011    Epigastric pain 2/16/2011    Essential hypertension     Essential hypertension 12/4/2015    Hyperlipidemia 2/16/2011    Hypertension 2/16/2011    Hypothyroidism 2/16/2011    s/p VALDES in 1998 and 1999 for hyperthyroidism    Neuropathy 2/16/2011    Orthostasis 7/6/2012    Orthostasis 7/6/2012    Osteoporosis 2/16/2011    Pacemaker     Persistent disorder of initiating or maintaining sleep 4/22/2015    S/P cardiac pacemaker procedure 8/24/2011    SSS (sick sinus syndrome) ([facility] [address] 8/20/2011    Syncope     Syncope and collapse 8/19/2011    Urinary frequency 2/16/2011      Past Surgical History:   Procedure Laterality Date    HX GI  1993    colon resection    HX HEENT      tonsillectomy in 1950    HX PACEMAKER      ID ENTEROSCOPY > 2ND PRTN ILEUM CONTROL BLEEDING  6/23/2011          Current Outpatient Prescriptions   Medication Sig Dispense Refill    losartan (COZAAR) 50 mg tablet Take 1 Tab by mouth daily. 90 Tab 3    alendronate (FOSAMAX) 70 mg tablet Take 1 tablet by mouth  every Sunday 12 Tab 3    escitalopram oxalate (LEXAPRO) 10 mg tablet Take 1 tablet by mouth  daily 90 Tab 3    traMADol (ULTRAM) 50 mg tablet Take 1 Tab by mouth every six (6) hours as needed for Pain. 180 Tab 0    latanoprost (XALATAN) 0.005 % ophthalmic solution INSTILL 1 DROP IN BOTH EYES EVERY NIGHT AT BEDTIME  12    atorvastatin (LIPITOR) 10 mg tablet Take 1 Tab by mouth daily.  90 Tab 3    SYNTHROID 75 mcg tablet Take 1 tablet by mouth  daily before breakfast 90 Tab 3    gabapentin (NEURONTIN) 300 mg capsule Take 2 Caps by mouth three (3) times daily. (Patient taking differently: Take  by mouth. Two Capsules in the morning and One Capsule in the Evening) 540 Cap 3    co-enzyme Q-10 (CO Q-10) 100 mg capsule Take 100 mg by mouth daily.  Glucosamine &Chondroit-MV-Min3 569-706-84-0.5 mg tab Take 1 Tab by mouth two (2) times a day.  calcium-cholecalciferol, D3, (CALTRATE 600+D) tablet Take 2 Tabs by mouth daily.  vit B Cmplx 3-FA-Vit C-Biotin (NEPHRO BANDAR RX) 1- mg-mg-mcg tablet Take 1 Tab by mouth daily.  OMEGA-3 FATTY ACIDS/FISH OIL (OMEGA 3 FISH OIL PO) Take 1 Cap by mouth two (2) times a day.  POLYETHYLENE GLYCOL 3350 (MIRALAX PO) Take  by mouth. 1-2   tsp daily      aspirin delayed-release 81 mg tablet Take 81 mg by mouth daily.  cloNIDine HCl (CATAPRES) 0.1 mg tablet Take 1 Tab by mouth two (2) times a day. Take if bp > 170/90 as needed 20 Tab 0     Allergies   Allergen Reactions    Amoxicillin Unknown (comments)    Aricept [Donepezil] Other (comments)     Bad dreams.     Augmentin [Amoxicillin-Pot Clavulanate] Unknown (comments)    Pcn [Penicillins] Rash    Zithromax [Azithromycin] Unknown (comments)     Family History   Problem Relation Age of Onset    Diabetes Mother     Thyroid Disease Mother     Heart Disease Mother     Hypertension Father     Heart Disease Father     Diabetes Sister     Diabetes Brother      Social History   Substance Use Topics    Smoking status: Never Smoker    Smokeless tobacco: Never Used    Alcohol use No     Patient Active Problem List   Diagnosis Code    Epigastric pain R10.13    Hyperlipidemia E78.5    Neuropathy G62.9    Acquired hypothyroidism E03.9    Urinary frequency R35.0    Osteoporosis M81.0    Personal history of colon cancer Z85.038    Depression F32.9    Dementia F03.90    SSS (sick sinus syndrome) (HCC) I49.5    S/P cardiac pacemaker procedure Z95.0    DJD (degenerative joint disease) of knee M17.10    Essential hypertension, benign I10    Mobitz (type) II atrioventricular block I44.1    Cardiac pacemaker in situ Z95.0    Orthostasis I95.1    Syncope and collapse R55    Syncope due to orthostatic hypotension I95.1    Anxiety disorder F41.9    Persistent disorder of initiating or maintaining sleep G47.00    Essential hypertension I10    Syncope R55    Hypokalemia E87.6    Stenosis of both internal carotid arteries I65.23       Depression Risk Factor Screening:     PHQ 2 / 9, over the last two weeks 4/27/2017   Little interest or pleasure in doing things Not at all   Feeling down, depressed or hopeless Not at all   Total Score PHQ 2 0     Alcohol Risk Factor Screening: On any occasion during the past 3 months, have you had more than 3 drinks containing alcohol? No    Do you average more than 7 drinks per week? No      Functional Ability and Level of Safety:     Hearing Loss   mild    Activities of Daily Living   Partial assistance. Requires assistance with: ambulation    Fall Risk     Fall Risk Assessment, last 12 mths 4/27/2017   Able to walk? Yes   Fall in past 12 months? Yes   Fall with injury? Yes   Number of falls in past 12 months 4   Fall Risk Score 5     Abuse Screen   Patient is not abused    Review of Systems   Not required    Physical Examination     Evaluation of Cognitive Function:  Mood/affect:  neutral  Appearance: age appropriate and casually dressed  Family member/caregiver input: daughter present to assist providing medical history    No exam performed today for medicare wellness visit.     Patient Care Team:  Michelle Dao MD as PCP - General (Internal Medicine)  Yani Shah MD (Cardiology)  Cachorro Cook MD as Consulting Provider (Endocrinology)  Keron Prabhakar MD (Cardiology)  Anusha Gaxiola MD (Gastroenterology)  Carson Weller RN as Ambulatory Care Navigator  Christine Leiva Anthony Wood MD (Ophthalmology)    Advice/Referrals/Counseling   Education and counseling provided:  Cardiovascular screening blood test  Bone mass measurement (DEXA)  Screening for glaucoma  Diabetes screening test      Assessment/Plan     Encounter Diagnoses   Name Primary?  Fatigue, unspecified type Yes    Essential hypertension     Syncope and collapse     Dementia without behavioral disturbance, unspecified dementia type     Acquired hypothyroidism     Routine general medical examination at a health care facility     Screening for alcoholism     Screening for depression      1.  ADL's and support. Patient lives at home with her . Daughter lives across the street. Has caregiver assistance from 8-3 PM daily to help with ADL's and caring for her  who has dementia as well. ADL Assessment 4/27/2017   Feeding yourself No Help Needed   Getting from bed to chair No Help Needed   Getting dressed No Help Needed   Bathing or showering No Help Needed   Walk across the room (includes cane/walker) Help Needed   Using the telphone No Help Needed   Taking your medications Help Needed   Preparing meals No Help Needed   Managing money (expenses/bills) Help Needed   Moderately strenuous housework (laundry) No Help Needed   Shopping for personal items (toiletries/medicines) No Help Needed   Shopping for groceries No Help Needed   Driving Help Needed   Climbing a flight of stairs Help Needed   Getting to places beyond walking distances Help Needed     2. Cardiovascular blood tests. Lab Results   Component Value Date/Time    Cholesterol, total 159 12/16/2016 03:01 AM    HDL Cholesterol 63 12/16/2016 03:01 AM    LDL, calculated 80.8 12/16/2016 03:01 AM    VLDL, calculated 15.2 12/16/2016 03:01 AM    Triglyceride 76 12/16/2016 03:01 AM    CHOL/HDL Ratio 2.5 12/16/2016 03:01 AM     Up to date. 3.  Colorectal cancer screening. History of colon cancer about 20 years ago per patient's daughter.   No longer present. Patient and daughter confirmed that Dr. Nicholas Perry advised that no further routine follow up is needed at this time. 4.  Diabetes screening tests. Lab Results   Component Value Date/Time    Hemoglobin A1c 5.6 12/16/2016 02:01 AM    Hemoglobin A1c 5.6 08/23/2011 02:50 AM    Glucose 88 03/06/2017 03:12 PM    Glucose (POC) 96 12/17/2016 11:41 AM    LDL, calculated 80.8 12/16/2016 03:01 AM    Creatinine 0.86 03/06/2017 03:12 PM     A1c within normal limits. 5.  Glaucoma screenings. Completed annually with Dr. Marcela Egan. 6.  Flu vaccine. Completed. 7.  Pneumonia vaccine. Completed. 8.  AAA screening. No family history of AAA. 9.  Advance care planning. Patient and daughter confirmed that AMD on file is up to date. 10.  Bone density. Bon scan done on 1/28/16. Currently taking Fosamax. 11.  Mammography. Stopped due to age after 2011.      Brief history and med reconciliation completed by MA/LPN and reviewed by MD.

## 2017-04-27 NOTE — PATIENT INSTRUCTIONS
Cleo Coward Date 4/27/17  Medicare Part B Preventive Services Limitations Recommendation/Date completed if known Scheduled/ Next Due   Bone Mass Measurement  (age 72 & older, biennial) Requires diagnosis related to osteoporosis or estrogen deficiency. Biennial benefit unless patient has history of long-term glucocorticoid tx or baseline is needed because initial test was by other method Completed:  1/28/16 - bone scan ordered by Dr. Charbel Valdovinos    Recommended every 2 years DUE:  1/2018   Cardiovascular Screening Blood Tests (every 5 years)  Total cholesterol, HDL, Triglycerides Order as a panel if possible Completed:  12/2016    Recommended annually DUE:  12/2017   Colorectal Cancer Screening  -Fecal occult blood test (annual)  -Flexible sigmoidoscopy (5y)  -Screening colonoscopy (10y)  -Barium Enema  Completed:        Recommended every 10 years DUE:    Per Dr. Olivia Alberto to Prevent Tobacco Use (up to 8 sessions per year)  - Counseling greater than 3 and up to 10 minutes  - Counseling greater than 10 minutes Patients must be asymptomatic of tobacco-related conditions to receive as preventive service  Keep up the good work! Diabetes Screening Tests (at least every 3 years, Medicare covers annually or at 6-month intervals for prediabetic patients)    Fasting blood sugar (FBS) or glucose tolerance test (GTT)       Patient must be diagnosed with one of the following:  -Hypertension, Dyslipidemia, obesity, previous impaired FBS or GTT  Or any two of the following: overweight, FH of diabetes, age ? 72, history of gestational diabetes, birth of baby weighing more than 9 pounds Completed:    12/16/16 - normal A1c    Recommended annually DUE:    12/2017   Diabetes Self-Management Training (DSMT) (no USPSTF recommendation) Requires referral by treating physician for patient with diabetes or renal disease. 10 hours of initial DSMT session of no less than 30 minutes each in a continuous 12-month period.   2 hours of follow-up DSMT in subsequent years. Not indicated   Glaucoma Screening (no USPSTF recommendation) Diabetes mellitus, family history, , age 48 or over,  American, age 72 or over Completed:    Dr. Marcela Egan - annually     Recommended annually DUE:    July 2017   Human Immunodeficiency Virus (HIV) Screening (annually for increased risk patients)  HIV-1 and HIV-2 by EIA, OG, rapid antibody test, or oral mucosa transudate Patient must be at increased risk for HIV infection per USPSTF guidelines or pregnant. Tests covered annually for patients at increased risk. Pregnant patients may receive up to 3 test during pregnancy. n/a   Medical Nutrition Therapy (MNT) (for diabetes or renal disease not recommended schedule) Requires referral by treating physician for patient with diabetes or renal disease. Can be provided in same year as diabetes self-management training (DSMT), and CMS recommends medical nutrition therapy take place after DSMT. Up to 3 hours for initial year and 2 hours in subsequent years. n/a   Seasonal Influenza Vaccination (annually)  Completed       Recommended annually    DUE every Fall   Pneumococcal Vaccination (once after 72)  Completed    Hepatitis B Vaccinations (if medium/high risk) Medium/high risk factors:  End-stage renal disease,  Hemophiliacs who received Factor VIII or IX concentrates, Clients of institutions for the mentally retarded, Persons who live in the same house as a HepB virus carrier, Homosexual men, Illicit injectable drug abusers. N/a    Screening Mammography (biennial age 54-69)?  Annually (age 36 or over) Completed:   2011  Recommended annually DUE:  Not done at this time    Screening Pap Tests and Pelvic Examination (up to age 79 and after 79 if unknown history or abnormal study last 10 years) Every 24 months except high risk Completed:        Recommended every 2 years DUE:    Not indicated at this time    Ultrasound Screening for Abdominal Aortic Aneurysm (AAA) (once) Patient must be referred through [facility] and not have had a screening for abdominal aortic aneurysm before under Medicare. Limited to patients who meet one of the following criteria:  - Men who are 73-68 years old and have smoked more than 100 cigarettes in their lifetime.  -Anyone with a FH of AAA  -Anyone recommended for screening by USPSTF Not covered by Medicare as preventive. Not indicated this time      Thanks for coming in today. It was nice to spend some time with you. If you have any questions about your visit today, please call [phone] and ask for Ginger Finn.

## 2017-04-27 NOTE — PROGRESS NOTES
SUBJECTIVE:   Ms. Adolfo Sepulveda presents today for follow up. Chief Complaint   Patient presents with    Hypertension    Follow-up     pt here today for 6 month f.u    Fatigue     pt concermed that she has no energy    Loss of Consciousness     pt states that on last week, she faint then had a fall         She has had some fainting spells. \"I think that by mistake they gave her the high dose losartan with HCTZ in it. \" However, had another fainting spell last week. Seeing Dr. Jordon Herrera and Dr. Lara Austin to follow up with the pacemaker. Neuropathy: Cut back the gabapentin to 1 capsule, because \"it was working so well she couldn't feel her toes at all, and that may have been contributing to balance issues; she seems to tolerate the current level of sensation. \" She continues to have ongoing foot pain, burning and stinging. She saw Dr. Pancho Burton with [facility] in the past, and was started on lyrica. This helped at first but then \"the second bottle didn't seem to do anything, so I stopped it. \"    Sleeping all the time. Insomnia: \"Mostly I sleep okay. \" She still has trouble with sleep onset some nights; also sleep maintenance. Some nights she is okay; other nights she pops awake sometimes at about 1 AM, and then awakens every hour. She denies that the pain is waking her up. \"I don't know what it is. I'm not worried about anything. \"   R knee pain still present. \"I live with that. \"  She has seen Dr. Mame Saldivar, not recently. She has had three injections. Putting off TKR for now. Knee pain is noted when going up stairs or when arising from chair. Has right SI pain, which is chronic. Anxiety and depression may be a bit better. She denies any problem at this time. It is recalled that she had neuropsych testing which revealed dementia, plus some overlay of depression. Stable. Never saw Dr. Shayne Pinzon. Tried aricept and felt it gave her bad dreams. For all issues, see A and P.     PAST MEDICAL HISTORY: She has a past medical history of Hypertension (2/16/2011); Back pain (2/16/2011); Epigastric pain (2/16/2011); Hyperlipidemia (2/16/2011); Neuropathy (2/16/2011); Hypothyroidism (2/16/2011); Urinary frequency (2/16/2011); and Osteoporosis (2/16/2011). Conjunctivitis, refractory (2011, seeing Dr. Waddell Lesches)  PAST SURGICAL HISTORY: Partial resection of colon due to ca, tonsillectomy 1950. Last colonoscopy by Dr. Karen Moss was a few yr ago. ALLERGIES: Amoxicillin; Aricept [donepezil]; Augmentin [amoxicillin-pot clavulanate]; Pcn [penicillins]; and Zithromax [azithromycin]  MEDICATIONS:  Current Outpatient Prescriptions on File Prior to Visit   Medication Sig Dispense Refill    losartan (COZAAR) 50 mg tablet Take 1 Tab by mouth daily. 90 Tab 3    alendronate (FOSAMAX) 70 mg tablet Take 1 tablet by mouth  every Sunday 12 Tab 3    escitalopram oxalate (LEXAPRO) 10 mg tablet Take 1 tablet by mouth  daily 90 Tab 3    traMADol (ULTRAM) 50 mg tablet Take 1 Tab by mouth every six (6) hours as needed for Pain. 180 Tab 0    latanoprost (XALATAN) 0.005 % ophthalmic solution INSTILL 1 DROP IN BOTH EYES EVERY NIGHT AT BEDTIME  12    atorvastatin (LIPITOR) 10 mg tablet Take 1 Tab by mouth daily. 90 Tab 3    SYNTHROID 75 mcg tablet Take 1 tablet by mouth  daily before breakfast 90 Tab 3    gabapentin (NEURONTIN) 300 mg capsule Take 2 Caps by mouth three (3) times daily. (Patient taking differently: Take  by mouth. Two Capsules in the morning and One Capsule in the Evening) 540 Cap 3    co-enzyme Q-10 (CO Q-10) 100 mg capsule Take 100 mg by mouth daily.  Glucosamine &Chondroit-MV-Min3 626-715-90-0.5 mg tab Take 1 Tab by mouth two (2) times a day.  calcium-cholecalciferol, D3, (CALTRATE 600+D) tablet Take 2 Tabs by mouth daily.  vit B Cmplx 3-FA-Vit C-Biotin (NEPHRO BANDAR RX) 1- mg-mg-mcg tablet Take 1 Tab by mouth daily.       OMEGA-3 FATTY ACIDS/FISH OIL (OMEGA 3 FISH OIL PO) Take 1 Cap by mouth two (2) times a day.  POLYETHYLENE GLYCOL 3350 (MIRALAX PO) Take  by mouth. 1-2   tsp daily      aspirin delayed-release 81 mg tablet Take 81 mg by mouth daily.  cloNIDine HCl (CATAPRES) 0.1 mg tablet Take 1 Tab by mouth two (2) times a day. Take if bp > 170/90 as needed 20 Tab 0     No current facility-administered medications on file prior to visit. FAMILY HISTORY: Her father  at age 61 of MI, and also had HTN. Her family history also includes diabetes in her brother, mother, and sister. SOCIAL HISTORY: She is . Retired. She reports that she has never smoked. She has never used smokeless tobacco. She reports that she does not drink alcohol or use illicit drugs. REVIEW OF SYSTEMS: See above; Complete ROS otherwise negative. In addition, complains of \"feet and hands always feel cold, even during warm weather,\" brain feels like it is in a \"fog\", muscles ache, skin is dry and flaky. OBJECTIVE:   Visit Vitals    /79 (BP 1 Location: Left arm, BP Patient Position: Sitting)    Pulse 63    Temp 98 °F (36.7 °C) (Oral)    Resp 16    Ht 5' 5\" (1.651 m)    Wt 169 lb 12.8 oz (77 kg)    SpO2 98%    BMI 28.26 kg/m2     Gen: Pleasant 80 y.o. W female in NAD. HEENT: moist pink. No erythema or exudates. HEART: RRR, No M/G/R.  LUNGS: CTAB No W/R. Effort is unlabored. ABDOMEN: S, NT, ND, BS+. EXTREMITIES: Warm. No C/C/E. Cerumen impaction L ear. ASSESSMENT/ PLAN:     1. Hypertension: Given syncope, we'll need to probably let this run high. In fact, after discussion of risk/benefit with daughter, we'll cut the losartan from 50 to 25.   2. Neuropathy: Cut back the gabapentin to 1 capsule, because \"it was working so well she couldn't feel her toes at all, and that may have been contributing to balance issues; she seems to tolerate the current level of sensation. \" Progressively worsening over time--relatively stable right now. Previously referred to Neurology.   As noted last visit: We will put her on a trial of amitriptyline--. She was on cymbalta for a time (stopped when lyrica was begun); more recently lyrica. She had some success initially on this, and I note that her dose was rather small. She could try this again in the future, but for now she is leery to do so due to cost of the med. 3. Fatigue / malaise: Seems to be worse. 4. Cerumen impaction L ear: Attempted to flush and remove debris by instrumentation. A lot of material remained in the ear. Refer to ENT. 5. Insomnia: Ongoing. Mainly problem with sleep maintenance. We'll try prn zolpidem. 6. Epigastric pain: Better. On Ranitidine. 7. Dementia: Stable. She is s/p neuropsych testing with Dr. Jose Kessler. Apparently didn't tolerate aricept. Also off namenda--not sure why. Referred previously to neurology. 8. Depression:  Better, per patient. Denies current depression. 9. Hypothyroidism: Follow up with endocrinologist Dr. Krissy Ha. 10. 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 Stable. 11. Abnormal LFT: prior work up with ultrasound and labs unrevealing. Monitor labs. 12. Hyperlipidemia: Continue fish oil; Off statin right now. 13. Urinary frequency: Stable on ditropan. 14. Osteoporosis: Continue fosamax and Vit D +Ca. 15. Weight: Advocated eating healthy-cut out sweets. Exercise. Medications ordered as noted above and risks/side effects/benefits discussed. Diagnosis(es) and condition(s) explained to patient and questions answered. Literature provided as appropriate. Follow-up Disposition:  Return in about 6 months (around 10/27/2017) for HTN.

## 2017-05-18 ENCOUNTER — OFFICE VISIT (OUTPATIENT)
Dept: ENDOCRINOLOGY | Age: 82
End: 2017-05-18

## 2017-05-18 VITALS
BODY MASS INDEX: 28.16 KG/M2 | DIASTOLIC BLOOD PRESSURE: 62 MMHG | WEIGHT: 169 LBS | SYSTOLIC BLOOD PRESSURE: 119 MMHG | HEART RATE: 65 BPM | HEIGHT: 65 IN

## 2017-05-18 DIAGNOSIS — R55 SYNCOPE AND COLLAPSE: ICD-10-CM

## 2017-05-18 DIAGNOSIS — E89.0 POSTABLATIVE HYPOTHYROIDISM: Primary | ICD-10-CM

## 2017-05-18 RX ORDER — GABAPENTIN 300 MG/1
CAPSULE ORAL
COMMUNITY
Start: 2017-05-18 | End: 2018-01-01

## 2017-05-18 RX ORDER — LOSARTAN POTASSIUM 50 MG/1
TABLET ORAL
Qty: 90 TAB | Refills: 3
Start: 2017-05-18 | End: 2018-01-01

## 2017-05-18 NOTE — PATIENT INSTRUCTIONS
1) Double check which dose of losartan she is taking. Dr. Blanka Moralez note from 4/27/17 states to take 25 mg daily which would be 1/2 of the 50 mg tablet. 2) I will repeat your thyroid tests to see if you need a change in your dose of synthroid. 3) To improve absorption, try taking 30 minutes before coffee or move the synthroid to bedtime.

## 2017-05-18 NOTE — PROGRESS NOTES
Chief Complaint   Patient presents with    Thyroid Problem     pcp and pharmacy verified. History of Present Illness: Gilford Lover is a 80 y.o. female here for follow up of thyroid. Weight down 9 lbs since last visit in 7/16. Has been taking the synthroid 30 min before food but may have coffee within 30 min. Has moved the calcium and vitamins to mid-day. Has had some improvement in energy but at times can still feel tired. Bowels are regular and takes miralax to help. No hair loss or brittle nails. No heat or cold intolerance. No chest pain or palpitations. Has had several syncopal episodes and her hctz was stopped and changed to plain losartan. Then her dose was decreased from 100 to 50 mg daily but per Dr. Yadira Pozo note on 4/27/17, it states to decrease to 25 mg daily but this was not updated on her med list so I updated it today and pt's daughter will confirm that she is on this dose. Current Outpatient Prescriptions   Medication Sig    gabapentin (NEURONTIN) 300 mg capsule Two Capsules in the morning and One Capsule in the Evening    losartan (COZAAR) 50 mg tablet Take 1/2 tab daily--Dose change 4/27/17--updated med list--did not send prescription to the pharmacy    alendronate (FOSAMAX) 70 mg tablet Take 1 tablet by mouth  every Sunday    escitalopram oxalate (LEXAPRO) 10 mg tablet Take 1 tablet by mouth  daily    traMADol (ULTRAM) 50 mg tablet Take 1 Tab by mouth every six (6) hours as needed for Pain.  latanoprost (XALATAN) 0.005 % ophthalmic solution INSTILL 1 DROP IN BOTH EYES EVERY NIGHT AT BEDTIME    atorvastatin (LIPITOR) 10 mg tablet Take 1 Tab by mouth daily.  SYNTHROID 75 mcg tablet Take 1 tablet by mouth  daily before breakfast    co-enzyme Q-10 (CO Q-10) 100 mg capsule Take 200 mg by mouth daily.  Glucosamine &Chondroit-MV-Min3 337-112-23-0.5 mg tab Take 2 Tabs by mouth daily.  calcium-cholecalciferol, D3, (CALTRATE 600+D) tablet Take 2 Tabs by mouth daily.  vit B Cmplx 3-FA-Vit C-Biotin (NEPHRO BANDAR RX) 1- mg-mg-mcg tablet Take 1 Tab by mouth daily.  OMEGA-3 FATTY ACIDS/FISH OIL (OMEGA 3 FISH OIL PO) Take 1 Cap by mouth two (2) times a day.  POLYETHYLENE GLYCOL 3350 (MIRALAX PO) Take  by mouth. 1-2   tsp daily    aspirin delayed-release 81 mg tablet Take 81 mg by mouth daily. No current facility-administered medications for this visit. Allergies   Allergen Reactions    Amoxicillin Unknown (comments)    Aricept [Donepezil] Other (comments)     Bad dreams.  Augmentin [Amoxicillin-Pot Clavulanate] Unknown (comments)    Pcn [Penicillins] Rash    Zithromax [Azithromycin] Unknown (comments)     Review of Systems:  - Cardiovascular: no chest pain  - Neurological: no tremors  - Integumentary: skin is normal    Physical Examination:  Blood pressure 119/62, pulse 65, height 5' 5\" (1.651 m), weight 169 lb (76.7 kg). 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 Her TSH was 1.24 in 8/11 on 75 mcg of Levoxyl daily and I increased her dose to 88 mcg daily and was able to push her TSH to 0.651 in January 2012. In July her level was normal at 2.45 so I kept her dose the same. In Nov 2012, her TSH was low at 0.306 and she was having some chest pain so I decreased her dose to an equivalent of 81 mcg/day by taking 1/2 tab on Sun only. TSH was up to 0.321 in 2/13 but still low so I decreased her back to 75 mcg daily and TSH is normal at 0.86 in 6/13 and she has no more palpitations.  We had to switch to Synthroid as Levoxyl was on national backorder and TSH still normal at 1.92 in 9/13 and 1.37 in 3/14 and 2.07 in 7/14 and 1.86 in 4/15 and 1.19 in 10/15. Up to 2.95 in 7/16 but had been taking with calcium so moved this away to improve absorption and this has helped with energy but still taking within 30 min of coffee so will work on this  - cont Synthroid 75 mcg daily until labs are back  - check TSH and free T4 today and at next visit       Patient Instructions   1) Double check which dose of losartan she is taking. Dr. Kristina Gallo note from 4/27/17 states to take 25 mg daily which would be 1/2 of the 50 mg tablet. 2) I will repeat your thyroid tests to see if you need a change in your dose of synthroid. 3) To improve absorption, try taking 30 minutes before coffee or move the synthroid to bedtime. Follow-up Disposition:  Return in about 6 months (around 11/18/2017). Copy sent to:  Dr. Valentin White via [employer] [facility]  Dr. Chelsea Man via [employer] [facility]  Dr. Lashell Amador via [facility]  Dr. Roya Redd at [facility]    Lab follow up: 5/19/17  Component      Latest Ref Rng & Units 5/18/2017 5/18/2017          11:11 AM 11:11 AM   TSH      0.450 - 4.500 uIU/mL  0.336 (L)   T4, Free      0.82 - 1.77 ng/dL 1.21      Sent her daughter the following message through [facility]):  TSH is a thyroid test.  Your level is 0.336 which is now low and below goal of 0.5-2.0. This test goes opposite of your thyroid dose and suggests your dose of synthroid is now more than you need as you have been absorbing the pill better with taking it away from calcium. I'm concerned that with moving the pill away from coffee, this will make your TSH go even lower. Therefore I would like you to start taking 1 tab on Mon-Sat and none on Sunday until you come back to see me and I updated this prescription with [facility].

## 2017-05-18 NOTE — MR AVS SNAPSHOT
Visit Information Date & Time Provider Department Dept. Phone Encounter #  
 5/18/2017  9:10 AM [place] Yolanda, [address] [facility] [phone] [id number] Follow-up Instructions Return in about 6 months (around 11/18/2017). Your Appointments 6/28/2017  2:15 PM  
6 MONTH with Amol Coronel MD  
[facility] [address] [facility]) Appt Note: . [address] [place] [address] [address] [facility] [address]  
[phone]  
  
   
 [address] [address]  
  
    
 10/30/2017 10:30 AM  
ROUTINE CARE with Carmencita Diggs MD  
[facility]) Appt Note: 6 mon f/u  
 [place] [phone]  
[address] [facility] [address] [place] [address] Upcoming Health Maintenance Date Due  
 GLAUCOMA SCREENING Q2Y 7/29/2016 INFLUENZA AGE 9 TO ADULT 8/1/2017 MEDICARE YEARLY EXAM 4/28/2018 DTaP/Tdap/Td series (2 - Td) 10/22/2025 Allergies as of 5/18/2017  Review Complete On: 5/18/2017 By: Blanca Curtis LPN Severity Noted Reaction Type Reactions Amoxicillin  02/16/2011    Unknown (comments) Aricept [Donepezil]  01/25/2012    Other (comments) Bad dreams. Augmentin [Amoxicillin-pot Clavulanate]  02/16/2011    Unknown (comments) Pcn [Penicillins]  12/20/2016    Rash Zithromax [Azithromycin]  02/16/2011    Unknown (comments) Current Immunizations  Reviewed on 10/22/2015 Name Date Influenza Vaccine 10/22/2014 10:31 AM, 10/21/2013 Influenza Vaccine [place]) 10/22/2015 Influenza Vaccine (Quad) PF 10/27/2016 Influenza Vaccine Split 10/9/2012, 10/3/2011 Influenza Vaccine Whole 10/1/2010 Pneumococcal Conjugate (PCV-13) 11/17/2015 Pneumococcal Vaccine (Unspecified Type) 10/1/2010 Tdap 10/22/2015 Not reviewed this visit You Were Diagnosed With   
  
 Codes Comments Postablative hypothyroidism    -  Primary ICD-10-CM: E89.0 ICD-9-CM: 244.1 Syncope and collapse     ICD-10-CM: R55 
ICD-9-CM: 780. 2 Vitals BP Pulse Height(growth percentile) Weight(growth percentile) BMI OB Status 119/62 65 5' 5\" (1.651 m) 169 lb (76.7 kg) 28.12 kg/m2 Postmenopausal  
 Smoking Status Never Smoker Vitals History BMI and BSA Data Body Mass Index Body Surface Area  
 28.12 kg/m 2 1.88 m 2 Preferred Pharmacy Pharmacy Name Phone [address] [facility], [address] [facility] [address] Your Updated Medication List  
  
   
This list is accurate as of: 5/18/17  9:36 AM.  Always use your most recent med list.  
  
  
  
  
 alendronate 70 mg tablet Commonly known as:  FOSAMAX Take 1 tablet by mouth  every Sunday  
  
 aspirin delayed-release 81 mg tablet Take 81 mg by mouth daily. atorvastatin 10 mg tablet Commonly known as:  LIPITOR Take 1 Tab by mouth daily. calcium-cholecalciferol (D3) tablet Commonly known as:  CALTRATE 600+D Take 2 Tabs by mouth daily. co-enzyme Q-10 100 mg capsule Commonly known as:  CO Q-10 Take 200 mg by mouth daily. escitalopram oxalate 10 mg tablet Commonly known as:  Yelena Nikky Take 1 tablet by mouth  daily  
  
 gabapentin 300 mg capsule Commonly known as:  NEURONTIN Two Capsules in the morning and One Capsule in the Evening Glucosamine &Chondroit-MV-Min3 833-364-21-0.5 mg Tab Take 2 Tabs by mouth daily. latanoprost 0.005 % ophthalmic solution Commonly known as:  XALATAN  
INSTILL 1 DROP IN BOTH EYES EVERY NIGHT AT BEDTIME  
  
 losartan 50 mg tablet Commonly known as:  COZAAR Take 1/2 tab daily--Dose change 4/27/17--updated med list--did not send prescription to the pharmacy MIRALAX PO Take  by mouth. 1-2  tsp daily OMEGA 3 FISH OIL PO Take 1 Cap by mouth two (2) times a day. SYNTHROID 75 mcg tablet Generic drug:  levothyroxine Take 1 tablet by mouth  daily before breakfast  
  
 traMADol 50 mg tablet Commonly known as:  ULTRAM  
Take 1 Tab by mouth every six (6) hours as needed for Pain.  
  
 vit B Cmplx 3-FA-Vit C-Biotin 1- mg-mg-mcg tablet Commonly known as:  NEPHRO BANDAR RX Take 1 Tab by mouth daily. We Performed the Following [place] COLLECTION VENOUS BLOOD,VENIPUNCTURE [id number] CPT(R)] WI HANDLG&/OR CONVEY OF SPEC FOR TR OFFICE TO LAB [90867 CPT(R)] T4, FREE [id number] CPT(R)] TSH 3RD GENERATION [89788 CPT(R)] Follow-up Instructions Return in about 6 months (around 11/18/2017). Patient Instructions 1) Double check which dose of losartan she is taking. Dr. Kathy Workman note from 4/27/17 states to take 25 mg daily which would be 1/2 of the 50 mg tablet. 2) I will repeat your thyroid tests to see if you need a change in your dose of synthroid. 3) To improve absorption, try taking 30 minutes before coffee or move the synthroid to bedtime. Introducing [facility]! Dear Thierno Whiteside: 
Thank you for requesting a [employer] account. Our records indicate that you already have an active [employer] account. You can access your account anytime at [email or web address] Did you know that you can access your hospital and ER discharge instructions at any time in [employer]? You can also review all of your test results from your hospital stay or ER visit. Additional Information If you have questions, please visit the Frequently Asked Questions section of the [employer] website at [email or web address] Remember, [employer] is NOT to be used for urgent needs. For medical emergencies, dial 911. Now available from your iPhone and Android! Please provide this summary of care documentation to your next provider. Your primary care clinician is listed as [place].  If you have any questions after today's visit, please call [phone].

## 2017-05-19 LAB
T4 FREE SERPL-MCNC: 1.21 NG/DL (ref 0.82–1.77)
TSH SERPL DL<=0.005 MIU/L-ACNC: 0.34 UIU/ML (ref 0.45–4.5)

## 2017-05-19 RX ORDER — LEVOTHYROXINE SODIUM 75 UG/1
TABLET ORAL
Qty: 90 TAB | Refills: 3 | Status: SHIPPED | OUTPATIENT
Start: 2017-05-19 | End: 2018-01-01 | Stop reason: SDUPTHER

## 2017-06-28 ENCOUNTER — OFFICE VISIT (OUTPATIENT)
Dept: CARDIOLOGY CLINIC | Age: 82
End: 2017-06-28

## 2017-06-28 VITALS
DIASTOLIC BLOOD PRESSURE: 80 MMHG | HEIGHT: 65 IN | RESPIRATION RATE: 18 BRPM | BODY MASS INDEX: 28.02 KG/M2 | OXYGEN SATURATION: 99 % | WEIGHT: 168.2 LBS | HEART RATE: 60 BPM | SYSTOLIC BLOOD PRESSURE: 122 MMHG

## 2017-06-28 DIAGNOSIS — F41.9 ANXIETY DISORDER, UNSPECIFIED TYPE: ICD-10-CM

## 2017-06-28 DIAGNOSIS — I95.1 SYNCOPE DUE TO ORTHOSTATIC HYPOTENSION: ICD-10-CM

## 2017-06-28 DIAGNOSIS — I10 ESSENTIAL HYPERTENSION: Primary | ICD-10-CM

## 2017-06-28 DIAGNOSIS — Z95.0 CARDIAC PACEMAKER IN SITU: ICD-10-CM

## 2017-06-28 NOTE — MR AVS SNAPSHOT
Visit Information Date & Time Provider Department Dept. Phone Encounter #  
 6/28/2017  2:15 PM Paige Pinzon, [address] [facility] Cardiology Associate Brian [phone] [id number] Your Appointments 10/30/2017 10:30 AM  
ROUTINE CARE with Clotilde White MD  
[facility] Appt Note: 6 mon f/u  
 [address] [phone]  
  
   
 [address] [facility]  [address] [address]  
  
    
 11/16/2017  9:50 AM  
Follow Up with Nimesh Napier MD  
[facility]) Appt Note: 6 month f/u Thyroid [address] [phone] [address]  
  
    
 2/7/2018  1:00 PM  
6 MONTH with Paige Pinzon, [address] Cardiology Associate [facility]) Appt Note: $0CP 6/28/17ksr [address] [place] [address]  
[phone]  
  
   
 [address] Upcoming Health Maintenance Date Due  
 GLAUCOMA SCREENING Q2Y 7/29/2016 INFLUENZA AGE 9 TO ADULT 8/1/2017 MEDICARE YEARLY EXAM 4/28/2018 DTaP/Tdap/Td series (2 - Td) 10/22/2025 Allergies as of 6/28/2017  Review Complete On: 6/28/2017 By: Mini Mazariegos LPN Severity Noted Reaction Type Reactions Amoxicillin  02/16/2011    Unknown (comments) Aricept [Donepezil]  01/25/2012    Other (comments) Bad dreams. Augmentin [Amoxicillin-pot Clavulanate]  02/16/2011    Unknown (comments) Pcn [Penicillins]  12/20/2016    Rash Zithromax [Azithromycin]  02/16/2011    Unknown (comments) Current Immunizations  Reviewed on 10/22/2015 Name Date Influenza Vaccine 10/22/2014 10:31 AM, 10/21/2013 Influenza Vaccine Hardeep Camps) 10/22/2015 Influenza Vaccine (Quad) PF 10/27/2016 Influenza Vaccine Split 10/9/2012, 10/3/2011 Influenza Vaccine Whole 10/1/2010 Pneumococcal Conjugate (PCV-13) 11/17/2015 Pneumococcal Vaccine (Unspecified Type) 10/1/2010 Tdap 10/22/2015 Not reviewed this visit You Were Diagnosed With   
  
 Codes Comments Essential hypertension    -  Primary ICD-10-CM: I10 
ICD-9-CM: 401.9 Anxiety disorder, unspecified type     ICD-10-CM: F41.9 ICD-9-CM: 300.00 Cardiac pacemaker in situ     ICD-10-CM: Z95.0 ICD-9-CM: V45.01 Syncope due to orthostatic hypotension     ICD-10-CM: I95.1 ICD-9-CM: 458.0 Vitals BP Pulse Resp Height(growth percentile) Weight(growth percentile) SpO2  
 122/80 (BP 1 Location: Left arm, BP Patient Position: Sitting) 60 18 5' 5\" (1.651 m) 168 lb 3.2 oz (76.3 kg) 99% BMI OB Status Smoking Status 27.99 kg/m2 Postmenopausal Never Smoker Vitals History BMI and BSA Data Body Mass Index Body Surface Area  
 27.99 kg/m 2 1.87 m 2 Preferred Pharmacy Pharmacy Name Phone [address] [place] [address], [address] [place] [address] [address] [facility] 134 Your Updated Medication List  
  
   
This list is accurate as of: 6/28/17  3:03 PM.  Always use your most recent med list.  
  
  
  
  
 alendronate 70 mg tablet Commonly known as:  FOSAMAX Take 1 tablet by mouth  every Sunday  
  
 aspirin delayed-release 81 mg tablet Take 81 mg by mouth daily. atorvastatin 10 mg tablet Commonly known as:  LIPITOR Take 1 Tab by mouth daily. calcium-cholecalciferol (D3) tablet Commonly known as:  CALTRATE 600+D Take 2 Tabs by mouth daily. co-enzyme Q-10 100 mg capsule Commonly known as:  CO Q-10 Take 200 mg by mouth daily. escitalopram oxalate 10 mg tablet Commonly known as:  Mahamed Ordonez Take 1 tablet by mouth  daily  
  
 gabapentin 300 mg capsule Commonly known as:  NEURONTIN Two Capsules in the morning and One Capsule in the Evening Glucosamine &Chondroit-MV-Min3 896-412-97-0.5 mg Tab Take 2 Tabs by mouth daily. latanoprost 0.005 % ophthalmic solution Commonly known as:  XALATAN  
INSTILL 1 DROP IN BOTH EYES EVERY NIGHT AT BEDTIME  
  
 losartan 50 mg tablet Commonly known as:  COZAAR Take 1/2 tab daily--Dose change 4/27/17--updated med list--did not send prescription to the pharmacy MIRALAX PO Take  by mouth. 1-2  tsp daily OMEGA 3 FISH OIL PO Take 1 Cap by mouth two (2) times a day. SYNTHROID 75 mcg tablet Generic drug:  levothyroxine Take 1 tablet by mouth on Monday through Saturday and none on Sunday at bedtime or 30 minutes before coffee in the morning  
  
 traMADol 50 mg tablet Commonly known as:  ULTRAM  
Take 1 Tab by mouth every six (6) hours as needed for Pain.  
  
 vit B Cmplx 3-FA-Vit C-Biotin 1- mg-mg-mcg tablet Commonly known as:  NEPHRO BANDAR RX Take 1 Tab by mouth daily. We Performed the Following AMB POC EKG ROUTINE W/ 12 LEADS, INTER & REP [27057 CPT(R)] Introducing [facility]! Dear Amirah Rizvi: 
Thank you for requesting a Noonswoon account. Our records indicate that you already have an active Noonswoon account. You can access your account anytime at [email or web address]. [employer] Did you know that you can access your hospital and ER discharge instructions at any time in Noonswoon? You can also review all of your test results from your hospital stay or ER visit. Additional Information If you have questions, please visit the Frequently Asked Questions section of the Noonswoon website at [email or web address] [employer]/. Remember, Noonswoon is NOT to be used for urgent needs. For medical emergencies, dial 911. Now available from your iPhone and Android! Please provide this summary of care documentation to your next provider. Your primary care clinician is listed as [place].  If you have any questions after today's visit, please call [phone].

## 2017-06-28 NOTE — PROGRESS NOTES
[address] [facility]        [phone]                             NEW PATIENT HPI/FOLLOW-UP    NAME:  Justin Mayer   :   3/6/1932   MRN:   [id number]   PCP:  Beatriz Jj MD           Subjective: The patient is a 80y.o. year old female  who returns for a routine follow-up. Since the last visit, patient reports no new symptoms. Denies change in exercise tolerance, chest pain, edema, medication intolerance, palpitations, shortness of breath, PND/orthopnea wheezing, sputum, syncope, dizziness or light headedness. Doing satisfactorily. Review of Systems  General: Pt denies excessive weight gain or loss. Pt is able to conduct ADL's. Respiratory: Denies shortness of breath, CHOW, wheezing or stridor.   Cardiovascular: Denies precordial pain, palpitations, edema or PND  Gastrointestinal: Denies poor appetite, indigestion, abdominal pain or blood in stool  Peripheral vascular: Denies claudication, leg cramps  Neurological: Denies paresthesias, tingling.numbness  Psychiatric: +anxiety,-depression,-fatigue  Musculoskeletal: Denies pain,tenderness, soreness,swelling      Past Medical History:   Diagnosis Date    Arthritis     Back pain 2011    Cancer ([address] 1993    colon    Chronic pain     in her back    Dementia     Mild; Neuropsych testing with Dr. Wero Lewis in     Dementia 2011    Epigastric pain 2011    Essential hypertension     Essential hypertension 2015    Hyperlipidemia 2011    Hypertension 2011    Hypothyroidism 2011    s/p VALDES in  and  for hyperthyroidism    Neuropathy 2011    Orthostasis 2012    Orthostasis 2012    Osteoporosis 2011    Pacemaker     Persistent disorder of initiating or maintaining sleep 2015    S/P cardiac pacemaker procedure 2011    SSS (sick sinus syndrome) ([facility] 2011    Syncope     Syncope and collapse 2011    Urinary frequency 2/16/2011     Patient Active Problem List    Diagnosis Date Noted    Stenosis of both internal carotid arteries 12/16/2016    Syncope 12/15/2016    Hypokalemia 12/15/2016    Essential hypertension 12/04/2015    Persistent disorder of initiating or maintaining sleep 04/22/2015    Anxiety disorder 12/04/2012    Syncope and collapse 07/11/2012    Syncope due to orthostatic hypotension 07/11/2012    Orthostasis 07/06/2012    Essential hypertension, benign 06/01/2012    Mobitz (type) II atrioventricular block 06/01/2012    Cardiac pacemaker in situ 06/01/2012    DJD (degenerative joint disease) of knee 01/25/2012    S/P cardiac pacemaker procedure 08/24/2011    [facility] 08/20/2011    Depression 07/21/2011    Dementia 07/21/2011    Personal history of colon cancer 06/23/2011    Epigastric pain 02/16/2011    Hyperlipidemia 02/16/2011    Neuropathy 02/16/2011    Postablative hypothyroidism 02/16/2011    Urinary frequency 02/16/2011    Osteoporosis 02/16/2011      Past Surgical History:   Procedure Laterality Date    HX GI  1993    colon resection    HX HEENT      tonsillectomy in 1950    HX PACEMAKER      RI ENTEROSCOPY > 2ND PRTN ILEUM CONTROL BLEEDING  6/23/2011          Allergies   Allergen Reactions    Amoxicillin Unknown (comments)    Aricept [Donepezil] Other (comments)     Bad dreams.  Augmentin [Amoxicillin-Pot Clavulanate] Unknown (comments)    Pcn [Penicillins] Rash    Zithromax [Azithromycin] Unknown (comments)      Family History   Problem Relation Age of Onset    Diabetes Mother     Thyroid Disease Mother     Heart Disease Mother     Hypertension Father     Heart Disease Father     Diabetes Sister     Diabetes Brother       Social History     Social History    Marital status:      Spouse name: N/A    Number of children: N/A    Years of education: N/A     Occupational History    Not on file.      Social History Main Topics    Smoking status: Never Smoker    Smokeless tobacco: Never Used    Alcohol use No    Drug use: No    Sexual activity: Not on file     Other Topics Concern    Not on file     Social History Narrative    Lives in [facility] with  of 54 years. Has a son and daughter. Used to work as a  for [employer] and at [employer] firm. Likes to garden and craft. Current Outpatient Prescriptions   Medication Sig    SYNTHROID 75 mcg tablet Take 1 tablet by mouth on Monday through Saturday and none on Sunday at bedtime or 30 minutes before coffee in the morning    gabapentin (NEURONTIN) 300 mg capsule Two Capsules in the morning and One Capsule in the Evening    losartan (COZAAR) 50 mg tablet Take 1/2 tab daily--Dose change 4/27/17--updated med list--did not send prescription to the pharmacy    alendronate (FOSAMAX) 70 mg tablet Take 1 tablet by mouth  every Sunday    escitalopram oxalate (LEXAPRO) 10 mg tablet Take 1 tablet by mouth  daily    traMADol (ULTRAM) 50 mg tablet Take 1 Tab by mouth every six (6) hours as needed for Pain.  latanoprost (XALATAN) 0.005 % ophthalmic solution INSTILL 1 DROP IN BOTH EYES EVERY NIGHT AT BEDTIME    atorvastatin (LIPITOR) 10 mg tablet Take 1 Tab by mouth daily.  co-enzyme Q-10 (CO Q-10) 100 mg capsule Take 200 mg by mouth daily.  Glucosamine &Chondroit-MV-Min3 176-518-68-0.5 mg tab Take 2 Tabs by mouth daily.  calcium-cholecalciferol, D3, (CALTRATE 600+D) tablet Take 2 Tabs by mouth daily.  vit B Cmplx 3-FA-Vit C-Biotin (NEPHRO BANDAR RX) 1- mg-mg-mcg tablet Take 1 Tab by mouth daily.  OMEGA-3 FATTY ACIDS/FISH OIL (OMEGA 3 FISH OIL PO) Take 1 Cap by mouth two (2) times a day.  POLYETHYLENE GLYCOL 3350 (MIRALAX PO) Take  by mouth. 1-2   tsp daily    aspirin delayed-release 81 mg tablet Take 81 mg by mouth daily. No current facility-administered medications for this visit.          I have reviewed the nurses notes, vitals, problem list, allergy list, medical history, family medical, social history and medications. Objective:     Physical Exam:     Vitals:    06/28/17 1420   BP: 122/80   Pulse: 60   Resp: 18   SpO2: 99%   Weight: 168 lb 3.2 oz (76.3 kg)   Height: 5' 5\" (1.651 m)    Body mass index is 27.99 kg/(m^2). General: Well developed, in no acute distress. HEENT: No carotid bruits, no JVD, trach is midline. Heart:  Normal S1/S2 negative S3 or S4. Regular, no murmur, gallop or rub.   Respiratory: Clear bilaterally, no wheezing or rales  Abdomen:   Soft, non-tender, bowel sounds are active.   Extremities:  No edema, normal cap refill, no cyanosis. Neuro: A&Ox3, speech clear, gait stable. Skin: Skin color is normal. No rashes or lesions. No diaphoresis.   Vascular: 2+ pulses symmetric in all extremities        Data Review:       Cardiographics:    EKG: Electronic ventricular pacemaker    Cardiology Labs:    Results for orders placed or performed during the hospital encounter of 03/06/17   EKG, 12 LEAD, INITIAL   Result Value Ref Range    Ventricular Rate 68 BPM    Atrial Rate 68 BPM    P-R Interval 202 ms    QRS Duration 184 ms    Q-T Interval 456 ms    QTC Calculation (Bezet) 484 ms    Calculated P Axis 13 degrees    Calculated R Axis 110 degrees    Calculated T Axis -26 degrees    Diagnosis       Normal sinus rhythm  Atrial-sensed ventricular-paced rhythm  Confirmed by Janene Concepcion ([id number]) on 3/7/2017 11:01:31 AM         Lab Results   Component Value Date/Time    Cholesterol, total 159 12/16/2016 03:01 AM    HDL Cholesterol 63 12/16/2016 03:01 AM    LDL, calculated 80.8 12/16/2016 03:01 AM    Triglyceride 76 12/16/2016 03:01 AM    CHOL/HDL Ratio 2.5 12/16/2016 03:01 AM       Lab Results   Component Value Date/Time    Sodium 134 03/06/2017 03:12 PM    Potassium 3.8 03/06/2017 03:12 PM    Chloride 95 03/06/2017 03:12 PM    CO2 31 03/06/2017 03:12 PM    Anion gap 8 03/06/2017 03:12 PM    Glucose 88 03/06/2017 03:12 PM    BUN 16 03/06/2017 03:12 PM    Creatinine 0.86 03/06/2017 03:12 PM    BUN/Creatinine ratio 19 03/06/2017 03:12 PM    GFR est AA >60 03/06/2017 03:12 PM    GFR est non-AA >60 03/06/2017 03:12 PM    Calcium 9.6 03/06/2017 03:12 PM    Bilirubin, total 0.4 03/06/2017 03:12 PM    AST (SGOT) 18 03/06/2017 03:12 PM    Alk. phosphatase 60 03/06/2017 03:12 PM    Protein, total 6.5 03/06/2017 03:12 PM    Albumin 3.6 03/06/2017 03:12 PM    Globulin 2.9 03/06/2017 03:12 PM    A-G Ratio 1.2 03/06/2017 03:12 PM    ALT (SGPT) 19 03/06/2017 03:12 PM          Assessment:       ICD-10-CM ICD-9-CM    1. Essential hypertension I10 401.9 AMB POC EKG ROUTINE W/ 12 LEADS, INTER & REP   2. Anxiety disorder, unspecified type F41.9 300.00    3. Cardiac pacemaker in situ Z95.0 V45.01    4. Syncope due to orthostatic hypotension--hx of I95.1 458.0          Discussion: Patient presents at this time stable from a cardiac perspective. No cardiac concerns at present. C/O is anxiety about progressive dementia in . Needs daughter to assist in care. Pleased with present status. Plan: 1. Continue same meds. Lipid profile and labs followed by PCP. 2.Encouraged to exercise to tolerance and follow low fat, low cholesterol, low sodium predominantly Plant-based (consider Mediterranean) diet. Call with questions or concerns. Will follow up any test results by phone and/or f/u here in office if needed. Chales Minus 3.Follow up: 6 MONTHS    I have discussed the diagnosis with the patient and the intended plan as seen in the above orders. The patient has received an after-visit summary and questions were answered concerning future plans. I have discussed any concerning medication side effects and warnings with the patient as well.     Amol Coronel MD  6/28/2017

## 2017-06-28 NOTE — PROGRESS NOTES
Chief Complaint   Patient presents with    Hypertension     6 mo appt. Denied cardiac symptoms.      \

## 2017-07-10 RX ORDER — ATORVASTATIN CALCIUM 10 MG/1
TABLET, FILM COATED ORAL
Qty: 90 TAB | Refills: 3 | Status: SHIPPED | OUTPATIENT
Start: 2017-07-10 | End: 2018-01-01 | Stop reason: SDUPTHER

## 2017-07-10 RX ORDER — GABAPENTIN 300 MG/1
CAPSULE ORAL
Qty: 540 CAP | Refills: 3 | Status: SHIPPED | OUTPATIENT
Start: 2017-07-10 | End: 2017-12-24 | Stop reason: SDUPTHER

## 2017-09-25 RX ORDER — ESCITALOPRAM OXALATE 10 MG/1
TABLET ORAL
Qty: 90 TAB | Refills: 1 | Status: SHIPPED | OUTPATIENT
Start: 2017-09-25 | End: 2018-01-01 | Stop reason: SDUPTHER

## 2017-10-30 ENCOUNTER — OFFICE VISIT (OUTPATIENT)
Dept: INTERNAL MEDICINE CLINIC | Age: 82
End: 2017-10-30

## 2017-10-30 VITALS
WEIGHT: 158.4 LBS | SYSTOLIC BLOOD PRESSURE: 120 MMHG | OXYGEN SATURATION: 97 % | HEIGHT: 65 IN | TEMPERATURE: 97.9 F | BODY MASS INDEX: 26.39 KG/M2 | HEART RATE: 60 BPM | DIASTOLIC BLOOD PRESSURE: 68 MMHG | RESPIRATION RATE: 18 BRPM

## 2017-10-30 DIAGNOSIS — E03.9 ACQUIRED HYPOTHYROIDISM: ICD-10-CM

## 2017-10-30 DIAGNOSIS — Z23 ENCOUNTER FOR IMMUNIZATION: ICD-10-CM

## 2017-10-30 DIAGNOSIS — E78.2 MIXED HYPERLIPIDEMIA: Chronic | ICD-10-CM

## 2017-10-30 DIAGNOSIS — R55 SYNCOPE, UNSPECIFIED SYNCOPE TYPE: ICD-10-CM

## 2017-10-30 DIAGNOSIS — G62.9 NEUROPATHY: ICD-10-CM

## 2017-10-30 DIAGNOSIS — I10 ESSENTIAL HYPERTENSION: Primary | ICD-10-CM

## 2017-10-30 NOTE — PROGRESS NOTES
1. Have you been to the ER, urgent care clinic since your last visit? Hospitalized since your last visit?no    2. Have you seen or consulted any other health care providers outside of the [address] [place] [address] since your last visit? Include any pap smears or colon screening.  no

## 2017-10-30 NOTE — MR AVS SNAPSHOT
Visit Information Date & Time Provider Department Dept. Phone Encounter #  
 10/30/2017 10:30 AM Bill Claire, [address] [id number] Follow-up Instructions Return in about 6 months (around 4/30/2018) for HTN. Your Appointments 11/16/2017  9:50 AM  
Follow Up with MD Miguel Campos [facility]) Appt Note: 6 month f/u Thyroid [address] [phone] [address] [place] [address] Road  
  
    
 2/7/2018  1:00 PM  
6 MONTH with Jenna Gibbs, [address] [facility] Cardiology Associate [address] [facility] Appt Note: $0CP 6/28/17ksr [address] [facility] [address]  
[phone]  
  
   
 [address] [place] [address] Road [address] Upcoming Health Maintenance Date Due  
 GLAUCOMA SCREENING Q2Y 7/29/2016 INFLUENZA AGE 9 TO ADULT 8/1/2017 MEDICARE YEARLY EXAM 4/28/2018 DTaP/Tdap/Td series (2 - Td) 10/22/2025 Allergies as of 10/30/2017  Review Complete On: 10/30/2017 By: Bill Claire MD  
  
 Severity Noted Reaction Type Reactions Amoxicillin  02/16/2011    Unknown (comments) Aricept [Donepezil]  01/25/2012    Other (comments) Bad dreams. Augmentin [Amoxicillin-pot Clavulanate]  02/16/2011    Unknown (comments) Pcn [Penicillins]  12/20/2016    Rash Zithromax [Azithromycin]  02/16/2011    Unknown (comments) Current Immunizations  Reviewed on 10/22/2015 Name Date Influenza High Dose Vaccine PF  Incomplete Influenza Vaccine 10/22/2014 10:31 AM, 10/21/2013 Influenza Vaccine Lis Hank) 10/22/2015 Influenza Vaccine (Quad) PF 10/27/2016 Influenza Vaccine Split 10/9/2012, 10/3/2011 Influenza Vaccine Whole 10/1/2010 Pneumococcal Conjugate (PCV-13) 11/17/2015 Tdap 10/22/2015 ZZZ-RETIRED (DO NOT USE) Pneumococcal Vaccine (Unspecified Type) 10/1/2010 Not reviewed this visit You Were Diagnosed With   
  
 Codes Comments Essential hypertension    -  Primary ICD-10-CM: I10 
ICD-9-CM: 401.9 Mixed hyperlipidemia     ICD-10-CM: E78.2 ICD-9-CM: 272.2 Syncope, unspecified syncope type     ICD-10-CM: R55 
ICD-9-CM: 780.2 Neuropathy     ICD-10-CM: G62.9 ICD-9-CM: 355.9 Acquired hypothyroidism     ICD-10-CM: E03.9 ICD-9-CM: 244.9 Encounter for immunization     ICD-10-CM: Y65 ICD-9-CM: V03.89 Vitals BP Pulse Temp Resp Height(growth percentile) Weight(growth percentile) 120/68 (BP 1 Location: Left arm, BP Patient Position: Sitting) 60 97.9 °F (36.6 °C) (Oral) 18 5' 5\" (1.651 m) 158 lb 6.4 oz (71.8 kg) SpO2 BMI OB Status Smoking Status 97% 26.36 kg/m2 Postmenopausal Never Smoker Vitals History BMI and BSA Data Body Mass Index Body Surface Area  
 26.36 kg/m 2 1.81 m 2 Preferred Pharmacy Pharmacy Name Phone [address] [facility], [address] [place] [address] [address] [facility] 134 Your Updated Medication List  
  
   
This list is accurate as of: 10/30/17 11:27 AM.  Always use your most recent med list.  
  
  
  
  
 alendronate 70 mg tablet Commonly known as:  FOSAMAX Take 1 tablet by mouth  every Sunday  
  
 aspirin delayed-release 81 mg tablet Take 81 mg by mouth daily. atorvastatin 10 mg tablet Commonly known as:  LIPITOR Take 1 tablet by mouth  daily  
  
 calcium-cholecalciferol (D3) tablet Commonly known as:  CALTRATE 600+D Take 2 Tabs by mouth daily. co-enzyme Q-10 100 mg capsule Commonly known as:  CO Q-10 Take 200 mg by mouth daily. escitalopram oxalate 10 mg tablet Commonly known as:  Darcus Skillern TAKE 1 TABLET BY MOUTH  DAILY * gabapentin 300 mg capsule Commonly known as:  NEURONTIN Two Capsules in the morning and One Capsule in the Evening * gabapentin 300 mg capsule Commonly known as:  NEURONTIN Take 2 capsules by mouth 3  times daily Glucosamine &Chondroit-MV-Min3 787-744-87-0.5 mg Tab Take 2 Tabs by mouth daily. latanoprost 0.005 % ophthalmic solution Commonly known as:  XALATAN  
INSTILL 1 DROP IN BOTH EYES EVERY NIGHT AT BEDTIME  
  
 losartan 50 mg tablet Commonly known as:  COZAAR Take 1/2 tab daily--Dose change 4/27/17--updated med list--did not send prescription to the pharmacy MIRALAX PO Take  by mouth. 1-2  tsp daily OMEGA 3 FISH OIL PO Take 1 Cap by mouth two (2) times a day. SYNTHROID 75 mcg tablet Generic drug:  levothyroxine Take 1 tablet by mouth on Monday through Saturday and none on Sunday at bedtime or 30 minutes before coffee in the morning  
  
 traMADol 50 mg tablet Commonly known as:  ULTRAM  
Take 1 Tab by mouth every six (6) hours as needed for Pain.  
  
 vit B Cmplx 3-FA-Vit C-Biotin 1- mg-mg-mcg tablet Commonly known as:  NEPHRO BANDAR RX Take 1 Tab by mouth daily. * Notice: This list has 2 medication(s) that are the same as other medications prescribed for you. Read the directions carefully, and ask your doctor or other care provider to review them with you. We Performed the Following ADMIN INFLUENZA VIRUS VAC [ [facility]] INFLUENZA VIRUS VACCINE, HIGH DOSE SEASONAL, PRESERVATIVE FREE [46339 CPT(R)] Follow-up Instructions Return in about 6 months (around 4/30/2018) for HTN. Introducing [facility]! Dear Samia Francis: 
Thank you for requesting a Vionic account. Our records indicate that you already have an active Vionic account. You can access your account anytime at [email or web address]. [email or web address] Did you know that you can access your hospital and ER discharge instructions at any time in Vionic? You can also review all of your test results from your hospital stay or ER visit. Additional Information If you have questions, please visit the Frequently Asked Questions section of the LifeBlinxhart website at [email or web address] Remember, MyWobile is NOT to be used for urgent needs. For medical emergencies, dial 911. Now available from your iPhone and Android! Please provide this summary of care documentation to your next provider. Your primary care clinician is listed as [place]. If you have any questions after today's visit, please call [phone].

## 2017-10-30 NOTE — PROGRESS NOTES
SUBJECTIVE:   Ms. Marylin Sepulveda presents today for follow up. Chief Complaint   Patient presents with    Cholesterol Problem     pt here today for 6 month f.u    Immunization/Injection     pt wants to discuss getting a flu shot        \"I have now pep\"--tired a lot. Naps at times. Still, is able to walk every day and has energy for activities. She has had no recent fainting spells, last in March. We noted previously: \"I think that by mistake they gave her the high dose losartan with HCTZ in it. \" However, had another fainting spell last week. Seeing Dr. Nestor Greene and Dr. Gerard Law to follow up with the pacemaker. Neuropathy: Cut back the gabapentin to 1 capsule, because \"it was working so well she couldn't feel her toes at all, and that may have been contributing to balance issues; she seems to tolerate the current level of sensation. \" She continues to have ongoing foot pain, burning and stinging. She saw Dr. Aneudy Beard with [facility] foot clinic in the past, and was started on lyrica. This helped at first but then \"the second bottle didn't seem to do anything, so I stopped it. \"    Insomnia: \"Mostly I sleep okay. \" She still has trouble with sleep onset some nights; also sleep maintenance. Some nights she is okay; other nights she pops awake sometimes at about 1 AM, and then awakens every hour. She denies that the pain is waking her up. \"I don't know what it is. I'm not worried about anything. \"   R knee pain still present. \"I live with that. \"  She has seen Dr. Aamir Whitten, not recently. She has had three injections. Putting off TKR for now. Knee pain is noted when going up stairs or when arising from chair. Has right SI pain, which is chronic. Anxiety and depression may be a bit better. She denies any problem at this time. It is recalled that she had neuropsych testing which revealed dementia, plus some overlay of depression. Stable. Never saw Dr. Gareth Quintero.   Tried aricept and felt it gave her bad dreams. For all issues, see A and P. PAST MEDICAL HISTORY: She has a past medical history of Hypertension (2/16/2011); Back pain (2/16/2011); Epigastric pain (2/16/2011); Hyperlipidemia (2/16/2011); Neuropathy (2/16/2011); Hypothyroidism (2/16/2011); Urinary frequency (2/16/2011); and Osteoporosis (2/16/2011). Conjunctivitis, refractory (2011, seeing Dr. Anival Nelsno)  PAST SURGICAL HISTORY: Partial resection of colon due to ca, tonsillectomy 1950. Last colonoscopy by Dr. Arthur Quintero was a few yr ago. ALLERGIES: Amoxicillin; Aricept [donepezil]; Augmentin [amoxicillin-pot clavulanate]; Pcn [penicillins]; and Zithromax [azithromycin]  MEDICATIONS:  Current Outpatient Prescriptions on File Prior to Visit   Medication Sig Dispense Refill    escitalopram oxalate (LEXAPRO) 10 mg tablet TAKE 1 TABLET BY MOUTH  DAILY 90 Tab 1    gabapentin (NEURONTIN) 300 mg capsule Take 2 capsules by mouth 3  times daily 540 Cap 3    atorvastatin (LIPITOR) 10 mg tablet Take 1 tablet by mouth  daily 90 Tab 3    SYNTHROID 75 mcg tablet Take 1 tablet by mouth on Monday through Saturday and none on Sunday at bedtime or 30 minutes before coffee in the morning 90 Tab 3    gabapentin (NEURONTIN) 300 mg capsule Two Capsules in the morning and One Capsule in the Evening      losartan (COZAAR) 50 mg tablet Take 1/2 tab daily--Dose change 4/27/17--updated med list--did not send prescription to the pharmacy 90 Tab 3    alendronate (FOSAMAX) 70 mg tablet Take 1 tablet by mouth  every Sunday 12 Tab 3    traMADol (ULTRAM) 50 mg tablet Take 1 Tab by mouth every six (6) hours as needed for Pain. 180 Tab 0    latanoprost (XALATAN) 0.005 % ophthalmic solution INSTILL 1 DROP IN BOTH EYES EVERY NIGHT AT BEDTIME  12    co-enzyme Q-10 (CO Q-10) 100 mg capsule Take 200 mg by mouth daily.  Glucosamine &Chondroit-MV-Min3 403-270-39-0.5 mg tab Take 2 Tabs by mouth daily.       calcium-cholecalciferol, D3, (CALTRATE 600+D) tablet Take 2 Tabs by mouth daily.      vit B Cmplx 3-FA-Vit C-Biotin (NEPHRO BANDAR RX) 1- mg-mg-mcg tablet Take 1 Tab by mouth daily.  OMEGA-3 FATTY ACIDS/FISH OIL (OMEGA 3 FISH OIL PO) Take 1 Cap by mouth two (2) times a day.  POLYETHYLENE GLYCOL 3350 (MIRALAX PO) Take  by mouth. 1-2   tsp daily      aspirin delayed-release 81 mg tablet Take 81 mg by mouth daily. No current facility-administered medications on file prior to visit. FAMILY HISTORY: Her father  at age 61 of MI, and also had HTN. Her family history also includes diabetes in her brother, mother, and sister. SOCIAL HISTORY: She is . Retired. She reports that she has never smoked. She has never used smokeless tobacco. She reports that she does not drink alcohol or use illicit drugs. REVIEW OF SYSTEMS: See above; Complete ROS otherwise negative. In addition, complains of \"feet and hands always feel cold, even during warm weather,\" brain feels like it is in a \"fog\", muscles ache, skin is dry and flaky. OBJECTIVE:   Visit Vitals    /68 (BP 1 Location: Left arm, BP Patient Position: Sitting)    Pulse 60    Temp 97.9 °F (36.6 °C) (Oral)    Resp 18    Ht 5' 5\" (1.651 m)    Wt 158 lb 6.4 oz (71.8 kg)    SpO2 97%    BMI 26.36 kg/m2     Gen: Pleasant 80 y.o. W female in NAD. HEENT: moist pink. No erythema or exudates. HEART: RRR, No M/G/R.  LUNGS: CTAB No W/R. Effort is unlabored. ABDOMEN: S, NT, ND, BS+. EXTREMITIES: Warm. No C/C/E. Cerumen impaction L ear. ASSESSMENT/ PLAN:     1. Hypertension: Given syncope, fine today; In the past we;ve noted:  we'll need to probably let this run high. 2. Neuropathy: Cut back the gabapentin to 1 capsule, because \"it was working so well she couldn't feel her toes at all, and that may have been contributing to balance issues; she seems to tolerate the current level of sensation. \" Progressively worsening over time--relatively stable right now. Previously referred to Neurology. As noted 2016 visit: We will put her on a trial of amitriptyline--. She was on cymbalta for a time (stopped when lyrica was begun); more recently lyrica. She had some success initially on this, and I note that her dose was rather small. She could try this again in the future, but for now she is leery to do so due to cost of the med. 3. Fatigue / malaise: Seems to be worse. 4. Insomnia: Ongoing. Mainly problem with sleep maintenance. We'll try prn zolpidem. 5. Epigastric pain: Better. On Ranitidine. 6. Dementia: Stable. She is s/p neuropsych testing with Dr. Brent Nichols. Apparently didn't tolerate aricept. Also off namenda--not sure why. Referred previously to neurology. 7. Depression:  Better, per patient. Denies current depression. 8. Hypothyroidism: Follow up with endocrinologist Dr. Panchito Connor. 9. 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 Stable. 10. Abnormal LFT: prior work up with ultrasound and labs unrevealing. Monitor labs. 11. Hyperlipidemia: Continue fish oil; Off statin right now. 12. Urinary frequency: Stable on ditropan. 13. Osteoporosis: Continue fosamax and Vit D +Ca. Medications ordered as noted above and risks/side effects/benefits discussed. Diagnosis(es) and condition(s) explained to patient and questions answered. Literature provided as appropriate. Follow-up Disposition:  Return in about 6 months (around 4/30/2018) for HTN.

## 2017-11-16 ENCOUNTER — OFFICE VISIT (OUTPATIENT)
Dept: ENDOCRINOLOGY | Age: 82
End: 2017-11-16

## 2017-11-16 VITALS
DIASTOLIC BLOOD PRESSURE: 88 MMHG | HEIGHT: 65 IN | BODY MASS INDEX: 26.62 KG/M2 | WEIGHT: 159.8 LBS | HEART RATE: 62 BPM | SYSTOLIC BLOOD PRESSURE: 136 MMHG

## 2017-11-16 DIAGNOSIS — E89.0 POSTABLATIVE HYPOTHYROIDISM: Primary | ICD-10-CM

## 2017-11-16 NOTE — PROGRESS NOTES
Chief Complaint   Patient presents with    Thyroid Problem     pcp and pharmacy confirmed     History of Present Illness: Malcom Bustos is a 80 y.o. female here for follow up of thyroid. Weight down 10 lbs since last visit in 5/17. Taking the synthroid at bedtime and skipping every Sunday. Still has some cold intolerance but no worse. No constipation as long as she uses miralax. No dry skin. No hair loss or brittle nails. No chest pain or palpitations or shortness of breath or tremors or anxiety or insomnia more than normal.      Current Outpatient Prescriptions   Medication Sig    escitalopram oxalate (LEXAPRO) 10 mg tablet TAKE 1 TABLET BY MOUTH  DAILY    gabapentin (NEURONTIN) 300 mg capsule Take 2 capsules by mouth 3  times daily    atorvastatin (LIPITOR) 10 mg tablet Take 1 tablet by mouth  daily    SYNTHROID 75 mcg tablet Take 1 tablet by mouth on Monday through Saturday and none on Sunday at bedtime or 30 minutes before coffee in the morning    gabapentin (NEURONTIN) 300 mg capsule Two Capsules in the morning and One Capsule in the Evening    losartan (COZAAR) 50 mg tablet Take 1/2 tab daily--Dose change 4/27/17--updated med list--did not send prescription to the pharmacy    alendronate (FOSAMAX) 70 mg tablet Take 1 tablet by mouth  every Sunday    traMADol (ULTRAM) 50 mg tablet Take 1 Tab by mouth every six (6) hours as needed for Pain.  latanoprost (XALATAN) 0.005 % ophthalmic solution INSTILL 1 DROP IN BOTH EYES EVERY NIGHT AT BEDTIME    co-enzyme Q-10 (CO Q-10) 100 mg capsule Take 200 mg by mouth daily.  Glucosamine &Chondroit-MV-Min3 785-708-67-0.5 mg tab Take 2 Tabs by mouth daily.  calcium-cholecalciferol, D3, (CALTRATE 600+D) tablet Take 2 Tabs by mouth daily.  vit B Cmplx 3-FA-Vit C-Biotin (NEPHRO BANDAR RX) 1- mg-mg-mcg tablet Take 1 Tab by mouth daily.  OMEGA-3 FATTY ACIDS/FISH OIL (OMEGA 3 FISH OIL PO) Take 1 Cap by mouth two (2) times a day.     POLYETHYLENE GLYCOL 3350 (MIRALAX PO) Take  by mouth. 1-2   tsp daily    aspirin delayed-release 81 mg tablet Take 81 mg by mouth daily. No current facility-administered medications for this visit. Allergies   Allergen Reactions    Amoxicillin Unknown (comments)    Aricept [Donepezil] Other (comments)     Bad dreams.  Augmentin [Amoxicillin-Pot Clavulanate] Unknown (comments)    Pcn [Penicillins] Rash    Zithromax [Azithromycin] Unknown (comments)     Review of Systems:  - Cardiovascular: no chest pain  - Respiratory: no shortness of breath  - Neurological: no tremors    Physical Examination:  Blood pressure 136/88, pulse 62, height 5' 5\" (1.651 m), weight 159 lb 12.8 oz (72.5 kg). 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 Her TSH was 1.24 in 8/11 on 75 mcg of Levoxyl daily and I increased her dose to 88 mcg daily and was able to push her TSH to 0.651 in January 2012. In July her level was normal at 2.45 so I kept her dose the same. In Nov 2012, her TSH was low at 0.306 and she was having some chest pain so I decreased her dose to an equivalent of 81 mcg/day by taking 1/2 tab on Sun only. TSH was up to 0.321 in 2/13 but still low so I decreased her back to 75 mcg daily and TSH is normal at 0.86 in 6/13 and she has no more palpitations. We had to switch to Synthroid as Levoxyl was on national backorder and TSH still normal at 1.92 in 9/13 and 1.37 in 3/14 and 2.07 in 7/14 and 1.86 in 4/15 and 1.19 in 10/15.   Up to 2.95 in 7/16 but had been taking with calcium so moved this away to improve absorption and this has helped with energy but still taking within 30 min of coffee and TSH 0.336 in 5/17 so decreased to 75 mcg 6 tabs/week and moved pill to bedtime  - cont Synthroid 75 mcg 1 tab on Mon-Sat and none on Sunday at bedtime until labs are back  - check TSH and free T4 today and at next visit       Patient Instructions   1) I will send you a message through [employer] with your lab results. Keep taking 1 tab 6 days a week at bedtime until you hear back from me, which may be after Thanksgiving depending on when the labs come back. Follow-up Disposition:  Return in about 6 months (around 5/16/2018). Copy sent to:  Dr. Tanvir Bowens via [employer]  Dr. Saritha Spivey via [employer]  Dr. Sj Pinon via [facility]  Dr. Lucho Healy at [facility]    Lab follow up: 11/17/17  Component      Latest Ref Rng & Units 11/16/2017 11/16/2017          10:29 AM 10:29 AM   T4, Free      0.82 - 1.77 ng/dL  0.88   TSH      0.450 - 4.500 uIU/mL 1.480      Sent her the following message through [employer]:  TSH is a thyroid test.  Your level is 1.48 which is normal and at goal of 0.5-2.0. This test goes opposite of your thyroid dose and suggests your dose of synthroid is perfect so I will keep your dose the same at 1 tab on Mon-Sat and none on Sunday.

## 2017-11-16 NOTE — MR AVS SNAPSHOT
Visit Information Date & Time Provider Department Dept. Phone Encounter #  
 11/16/2017  9:50 AM Colvin Gaucher, MD [facility] [phone] [id number] Follow-up Instructions Return in about 6 months (around 5/16/2018). Your Appointments 2/7/2018  1:00 PM  
6 MONTH with Bettina Scruggs MD  
[facility]) Appt Note: $0CP 6/28/17ksr [address] [facility] [address]  
[phone]  
  
   
 [address] [facility] [address]  
  
    
 4/30/2018 10:30 AM  
ROUTINE CARE with Jigar Oropeza MD  
[facility]) Appt Note: 6 month follow up  
 [facility] [address] [address]  
[address] [place] [address] Upcoming Health Maintenance Date Due  
 GLAUCOMA SCREENING Q2Y 7/29/2016 MEDICARE YEARLY EXAM 4/28/2018 DTaP/Tdap/Td series (2 - Td) 10/22/2025 Allergies as of 11/16/2017  Review Complete On: 11/16/2017 By: Colvin Gaucher, MD  
  
 Severity Noted Reaction Type Reactions Amoxicillin  02/16/2011    Unknown (comments) Aricept [Donepezil]  01/25/2012    Other (comments) Bad dreams. Augmentin [Amoxicillin-pot Clavulanate]  02/16/2011    Unknown (comments) Pcn [Penicillins]  12/20/2016    Rash Zithromax [Azithromycin]  02/16/2011    Unknown (comments) Current Immunizations  Reviewed on 10/22/2015 Name Date Influenza High Dose Vaccine PF 10/30/2017 11:45 AM  
 Influenza Vaccine 10/22/2014 10:31 AM, 10/21/2013 Influenza Vaccine [place]) 10/22/2015 Influenza Vaccine (Quad) PF 10/27/2016 Influenza Vaccine Split 10/9/2012, 10/3/2011 Influenza Vaccine Whole 10/1/2010 Pneumococcal Conjugate (PCV-13) 11/17/2015 Tdap 10/22/2015 ZZZ-RETIRED (DO NOT USE) Pneumococcal Vaccine (Unspecified Type) 10/1/2010 Not reviewed this visit You Were Diagnosed With   
  
 Codes Comments Postablative hypothyroidism    -  Primary ICD-10-CM: E89.0 ICD-9-CM: 244.1 Vitals BP Pulse Height(growth percentile) Weight(growth percentile) BMI OB Status 136/88 62 5' 5\" (1.651 m) 159 lb 12.8 oz (72.5 kg) 26.59 kg/m2 Postmenopausal  
 Smoking Status Never Smoker Vitals History BMI and BSA Data Body Mass Index Body Surface Area  
 26.59 kg/m 2 1.82 m 2 Preferred Pharmacy Pharmacy Name Phone [facility], [address] [place] [address] [address] Your Updated Medication List  
  
   
This list is accurate as of: 11/16/17 10:24 AM.  Always use your most recent med list.  
  
  
  
  
 alendronate 70 mg tablet Commonly known as:  FOSAMAX Take 1 tablet by mouth  every Sunday  
  
 aspirin delayed-release 81 mg tablet Take 81 mg by mouth daily. atorvastatin 10 mg tablet Commonly known as:  LIPITOR Take 1 tablet by mouth  daily  
  
 calcium-cholecalciferol (D3) tablet Commonly known as:  CALTRATE 600+D Take 2 Tabs by mouth daily. co-enzyme Q-10 100 mg capsule Commonly known as:  CO Q-10 Take 200 mg by mouth daily. escitalopram oxalate 10 mg tablet Commonly known as:  Shan Barrs TAKE 1 TABLET BY MOUTH  DAILY * gabapentin 300 mg capsule Commonly known as:  NEURONTIN Two Capsules in the morning and One Capsule in the Evening * gabapentin 300 mg capsule Commonly known as:  NEURONTIN Take 2 capsules by mouth 3  times daily Glucosamine &Chondroit-MV-Min3 057-749-45-0.5 mg Tab Take 2 Tabs by mouth daily. latanoprost 0.005 % ophthalmic solution Commonly known as:  XALATAN  
INSTILL 1 DROP IN BOTH EYES EVERY NIGHT AT BEDTIME  
  
 losartan 50 mg tablet Commonly known as:  COZAAR Take 1/2 tab daily--Dose change 4/27/17--updated med list--did not send prescription to the pharmacy  MIRALAX PO  
 Take  by mouth. 1-2  tsp daily OMEGA 3 FISH OIL PO Take 1 Cap by mouth two (2) times a day. SYNTHROID 75 mcg tablet Generic drug:  levothyroxine Take 1 tablet by mouth on Monday through Saturday and none on Sunday at bedtime or 30 minutes before coffee in the morning  
  
 traMADol 50 mg tablet Commonly known as:  ULTRAM  
Take 1 Tab by mouth every six (6) hours as needed for Pain.  
  
 vit B Cmplx 3-FA-Vit C-Biotin 1- mg-mg-mcg tablet Commonly known as:  NEPHRO BANDAR RX Take 1 Tab by mouth daily. * Notice: This list has 2 medication(s) that are the same as other medications prescribed for you. Read the directions carefully, and ask your doctor or other care provider to review them with you. We Performed the Following ID COLLECTION VENOUS BLOOD,VENIPUNCTURE [id number] CPT(R)] ID HANDLG&/OR CONVEY OF SPEC FOR TR OFFICE TO LAB [38459 CPT(R)] T4, FREE [id number] CPT(R)] TSH 3RD GENERATION [74863 CPT(R)] Follow-up Instructions Return in about 6 months (around 5/16/2018). Patient Instructions 1) I will send you a message through [email or web address] with your lab results. Keep taking 1 tab 6 days a week at bedtime until you hear back from me, which may be after Thanksgiving depending on when the labs come back. Introducing [facility] SERVICES! Dear Omari Pineda: 
Thank you for requesting a [email or web address] account. Our records indicate that you already have an active [email or web address] account. You can access your account anytime at [email or web address] Did you know that you can access your hospital and ER discharge instructions at any time in [email or web address]? You can also review all of your test results from your hospital stay or ER visit. Additional Information If you have questions, please visit the Frequently Asked Questions section of the [email or web address] website at [email or web address] Remember, MyChart is NOT to be used for urgent needs. For medical emergencies, dial 911. Now available from your iPhone and Android! Please provide this summary of care documentation to your next provider. Your primary care clinician is listed as [place]. If you have any questions after today's visit, please call [phone].

## 2017-11-16 NOTE — PATIENT INSTRUCTIONS
1) I will send you a message through [employer] with your lab results. Keep taking 1 tab 6 days a week at bedtime until you hear back from me, which may be after Thanksgiving depending on when the labs come back.

## 2017-11-17 LAB
T4 FREE SERPL-MCNC: 0.88 NG/DL (ref 0.82–1.77)
TSH SERPL DL<=0.005 MIU/L-ACNC: 1.48 UIU/ML (ref 0.45–4.5)

## 2017-12-24 ENCOUNTER — APPOINTMENT (OUTPATIENT)
Dept: GENERAL RADIOLOGY | Age: 82
End: 2017-12-24
Attending: EMERGENCY MEDICINE
Payer: MEDICARE

## 2017-12-24 ENCOUNTER — APPOINTMENT (OUTPATIENT)
Dept: CT IMAGING | Age: 82
End: 2017-12-24
Attending: EMERGENCY MEDICINE
Payer: MEDICARE

## 2017-12-24 ENCOUNTER — HOSPITAL ENCOUNTER (EMERGENCY)
Age: 82
Discharge: HOME | End: 2017-12-24
Attending: EMERGENCY MEDICINE
Payer: MEDICARE

## 2017-12-24 VITALS
OXYGEN SATURATION: 94 % | WEIGHT: 160 LBS | TEMPERATURE: 98.2 F | HEART RATE: 72 BPM | RESPIRATION RATE: 18 BRPM | HEIGHT: 65 IN | DIASTOLIC BLOOD PRESSURE: 85 MMHG | SYSTOLIC BLOOD PRESSURE: 173 MMHG | BODY MASS INDEX: 26.66 KG/M2
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
## 2017-12-24 DIAGNOSIS — S00.93XA CONTUSION OF HEAD, UNSPECIFIED PART OF HEAD, INITIAL ENCOUNTER: ICD-10-CM

## 2017-12-24 DIAGNOSIS — R55 SYNCOPE AND COLLAPSE: Primary | ICD-10-CM

## 2017-12-24 LAB
ALBUMIN SERPL-MCNC: 3.6 G/DL (ref 3.5–5)
ALBUMIN/GLOB SERPL: 1.1 {RATIO} (ref 1.1–2.2)
ALP SERPL-CCNC: 65 U/L (ref 45–117)
ALT SERPL-CCNC: 31 U/L (ref 12–78)
ANION GAP SERPL CALC-SCNC: 5 MMOL/L (ref 5–15)
APPEARANCE UR: CLEAR
APTT PPP: 27.1 SEC (ref 22.1–32.5)
AST SERPL-CCNC: 26 U/L (ref 15–37)
BACTERIA URNS QL MICRO: NEGATIVE /HPF
BASOPHILS # BLD: 0 K/UL (ref 0–0.1)
BASOPHILS NFR BLD: 0 % (ref 0–1)
BILIRUB SERPL-MCNC: 0.5 MG/DL (ref 0.2–1)
BILIRUB UR QL: NEGATIVE
BUN SERPL-MCNC: 17 MG/DL (ref 6–20)
BUN/CREAT SERPL: 18 (ref 12–20)
CALCIUM SERPL-MCNC: 9.1 MG/DL (ref 8.5–10.1)
CHLORIDE SERPL-SCNC: 102 MMOL/L (ref 97–108)
CK MB CFR SERPL CALC: 3 % (ref 0–2.5)
CK MB SERPL-MCNC: 4.7 NG/ML (ref 5–25)
CK SERPL-CCNC: 156 U/L (ref 26–192)
CO2 SERPL-SCNC: 31 MMOL/L (ref 21–32)
COLOR UR: NORMAL
CREAT SERPL-MCNC: 0.96 MG/DL (ref 0.55–1.02)
EOSINOPHIL # BLD: 0 K/UL (ref 0–0.4)
EOSINOPHIL NFR BLD: 0 % (ref 0–7)
EPITH CASTS URNS QL MICRO: NORMAL /LPF
ERYTHROCYTE [DISTWIDTH] IN BLOOD BY AUTOMATED COUNT: 14.4 % (ref 11.5–14.5)
GLOBULIN SER CALC-MCNC: 3.4 G/DL (ref 2–4)
GLUCOSE SERPL-MCNC: 98 MG/DL (ref 65–100)
GLUCOSE UR STRIP.AUTO-MCNC: NEGATIVE MG/DL
HCT VFR BLD AUTO: 39.5 % (ref 35–47)
HGB BLD-MCNC: 13.3 G/DL (ref 11.5–16)
HGB UR QL STRIP: NEGATIVE
HYALINE CASTS URNS QL MICRO: NORMAL /LPF (ref 0–5)
INR PPP: 1.1 (ref 0.9–1.1)
KETONES UR QL STRIP.AUTO: NEGATIVE MG/DL
LEUKOCYTE ESTERASE UR QL STRIP.AUTO: NEGATIVE
LYMPHOCYTES # BLD: 0.8 K/UL (ref 0.8–3.5)
LYMPHOCYTES NFR BLD: 11 % (ref 12–49)
MAGNESIUM SERPL-MCNC: 2.1 MG/DL (ref 1.6–2.4)
MCH RBC QN AUTO: 31 PG (ref 26–34)
MCHC RBC AUTO-ENTMCNC: 33.7 G/DL (ref 30–36.5)
MCV RBC AUTO: 92.1 FL (ref 80–99)
MONOCYTES # BLD: 0.5 K/UL (ref 0–1)
MONOCYTES NFR BLD: 7 % (ref 5–13)
NEUTS SEG # BLD: 6.2 K/UL (ref 1.8–8)
NEUTS SEG NFR BLD: 82 % (ref 32–75)
NITRITE UR QL STRIP.AUTO: NEGATIVE
PH UR STRIP: 7.5 [PH] (ref 5–8)
PLATELET # BLD AUTO: 199 K/UL (ref 150–400)
POTASSIUM SERPL-SCNC: 4.4 MMOL/L (ref 3.5–5.1)
PROT SERPL-MCNC: 7 G/DL (ref 6.4–8.2)
PROT UR STRIP-MCNC: NEGATIVE MG/DL
PROTHROMBIN TIME: 10.6 SEC (ref 9–11.1)
RBC # BLD AUTO: 4.29 M/UL (ref 3.8–5.2)
RBC #/AREA URNS HPF: NORMAL /HPF (ref 0–5)
SODIUM SERPL-SCNC: 138 MMOL/L (ref 136–145)
SP GR UR REFRACTOMETRY: 1.01 (ref 1–1.03)
THERAPEUTIC RANGE,PTTT: NORMAL SECS (ref 58–77)
TROPONIN I SERPL-MCNC: <0.04 NG/ML
UA: UC IF INDICATED,UAUC: NORMAL
UROBILINOGEN UR QL STRIP.AUTO: 0.2 EU/DL (ref 0.2–1)
WBC # BLD AUTO: 7.5 K/UL (ref 3.6–11)
WBC URNS QL MICRO: NORMAL /HPF (ref 0–4)

## 2017-12-24 PROCEDURE — 82550 ASSAY OF CK (CPK): CPT | Performed by: EMERGENCY MEDICINE

## 2017-12-24 PROCEDURE — 80053 COMPREHEN METABOLIC PANEL: CPT | Performed by: EMERGENCY MEDICINE

## 2017-12-24 PROCEDURE — 70450 CT HEAD/BRAIN W/O DYE: CPT

## 2017-12-24 PROCEDURE — 36415 COLL VENOUS BLD VENIPUNCTURE: CPT | Performed by: EMERGENCY MEDICINE

## 2017-12-24 PROCEDURE — 71020 XR CHEST PA LAT: CPT

## 2017-12-24 PROCEDURE — 81001 URINALYSIS AUTO W/SCOPE: CPT | Performed by: EMERGENCY MEDICINE

## 2017-12-24 PROCEDURE — 85730 THROMBOPLASTIN TIME PARTIAL: CPT | Performed by: EMERGENCY MEDICINE

## 2017-12-24 PROCEDURE — 83735 ASSAY OF MAGNESIUM: CPT | Performed by: EMERGENCY MEDICINE

## 2017-12-24 PROCEDURE — 93005 ELECTROCARDIOGRAM TRACING: CPT

## 2017-12-24 PROCEDURE — 85610 PROTHROMBIN TIME: CPT | Performed by: EMERGENCY MEDICINE

## 2017-12-24 PROCEDURE — 74011250637 HC RX REV CODE- 250/637: Performed by: EMERGENCY MEDICINE

## 2017-12-24 PROCEDURE — 99285 EMERGENCY DEPT VISIT HI MDM: CPT

## 2017-12-24 PROCEDURE — 84484 ASSAY OF TROPONIN QUANT: CPT | Performed by: EMERGENCY MEDICINE

## 2017-12-24 PROCEDURE — 85025 COMPLETE CBC W/AUTO DIFF WBC: CPT | Performed by: EMERGENCY MEDICINE

## 2017-12-24 RX ORDER — ACETAMINOPHEN 325 MG/1
650 TABLET ORAL
Status: COMPLETED | OUTPATIENT
Start: 2017-12-24 | End: 2017-12-24

## 2017-12-24 RX ADMIN — ACETAMINOPHEN 650 MG: 325 TABLET ORAL at 14:06

## 2017-12-24 NOTE — ED NOTES
Patient presents to ED after syncopal episode. Patient stated that she hit her head on the floor, complains of some pain on right side of her head. Per her son patient BP [facility] that was present at the house and found to be low, he stated she possibly had SBP in 40s but that it was up to 100 by the time that he got to the house. Patient son at bedside and stated that patient has had episodes of syncope several times and had her BP medications cut back as a result. Patient has pacemaker, denies feeling any changes. Patient alert and responding appropriately to questions. Son at bedside.

## 2017-12-24 NOTE — ED PROVIDER NOTES
EMERGENCY DEPARTMENT HISTORY AND PHYSICAL EXAM      Date: 12/24/2017  Patient Name: Brock Lakhani    History of Presenting Illness     Chief Complaint   Patient presents with    Syncope     at 1200 today while baking;also c/o head injury       History Provided By: Patient and Patient's Son    HPI: Brock Lakhani, 80 y.o. female with PMHx significant for HTN, thyroid dz, osteoporosis, colon CA, s/p pacemaker, presents ambulatory to the ED with cc of sudden onset of a mild to moderate syncopal event x this morning. Pt's son states he did not witness the event and notes the pt was found on the floor by the in-home nurse, who took the pt's BP at home. Son reports pt's initial systolic BP was in the 85D when he arrived in the room ~10 minute s/p CC onset and improved to the 100s. Pt denies any warning sx though does c/o a R sided headache, and states she struck the R side of her head when she fell to the floor. Pt specifically denies any N/V, CP, SOB, no other pain, vision changes, N/T, slurred speech, or feeling any pacemaker shocks. Patient walks with a cane. Son reports h/o multiple similar episodes and notes that pt recently had her blood pressure medication dose reduced twice. Pt takes daily ASA. PCP: Russell Mccollum MD   Cardiologist: Ning Cochran MD    There are no other complaints, changes, or physical findings at this time.     Current Outpatient Prescriptions   Medication Sig Dispense Refill    escitalopram oxalate (LEXAPRO) 10 mg tablet TAKE 1 TABLET BY MOUTH  DAILY 90 Tab 1    atorvastatin (LIPITOR) 10 mg tablet Take 1 tablet by mouth  daily 90 Tab 3    SYNTHROID 75 mcg tablet Take 1 tablet by mouth on Monday through Saturday and none on Sunday at bedtime or 30 minutes before coffee in the morning 90 Tab 3    gabapentin (NEURONTIN) 300 mg capsule Two Capsules in the morning and One Capsule in the Evening      losartan (COZAAR) 50 mg tablet Take 1/2 tab daily--Dose change 4/27/17--updated med list--did not send prescription to the pharmacy 90 Tab 3    alendronate (FOSAMAX) 70 mg tablet Take 1 tablet by mouth  every Sunday 12 Tab 3    traMADol (ULTRAM) 50 mg tablet Take 1 Tab by mouth every six (6) hours as needed for Pain. 180 Tab 0    co-enzyme Q-10 (CO Q-10) 100 mg capsule Take 200 mg by mouth daily.  Glucosamine &Chondroit-MV-Min3 863-111-31-0.5 mg tab Take 2 Tabs by mouth daily.  calcium-cholecalciferol, D3, (CALTRATE 600+D) tablet Take 2 Tabs by mouth daily.  vit B Cmplx 3-FA-Vit C-Biotin (NEPHRO BANDAR RX) 1- mg-mg-mcg tablet Take 1 Tab by mouth daily.  OMEGA-3 FATTY ACIDS/FISH OIL (OMEGA 3 FISH OIL PO) Take 1 Cap by mouth two (2) times a day.  POLYETHYLENE GLYCOL 3350 (MIRALAX PO) Take  by mouth. 1-2   tsp daily      aspirin delayed-release 81 mg tablet Take 81 mg by mouth daily.       latanoprost (XALATAN) 0.005 % ophthalmic solution INSTILL 1 DROP IN BOTH EYES EVERY NIGHT AT BEDTIME  12       Past History     Past Medical History:  Past Medical History:   Diagnosis Date    Acquired hypothyroidism 10/30/2017    Arthritis     Back pain 2/16/2011    Cancer ([facility] [address] 1993    colon    Chronic pain     in her back    Dementia 2011    Mild; Neuropsych testing with Dr. Cesar Meadows in 2011    Dementia 7/21/2011    Epigastric pain 2/16/2011    Essential hypertension     Essential hypertension 12/4/2015    Hyperlipidemia 2/16/2011    Hypertension 2/16/2011    Hypothyroidism 2/16/2011    s/p VALDES in 1998 and 1999 for hyperthyroidism    Neuropathy 2/16/2011    Orthostasis 7/6/2012    Orthostasis 7/6/2012    Osteoporosis 2/16/2011    Pacemaker     Persistent disorder of initiating or maintaining sleep 4/22/2015    S/P cardiac pacemaker procedure 8/24/2011    SSS (sick sinus syndrome) ([facility] [address] 8/20/2011    Syncope     Syncope and collapse 8/19/2011    Urinary frequency 2/16/2011       Past Surgical History:  Past Surgical History:   Procedure Laterality Date    HX GI  1993    colon resection    HX HEENT      tonsillectomy in 1950    HX PACEMAKER      MD ENTEROSCOPY > 2ND PRTN ILEUM CONTROL BLEEDING  6/23/2011            Family History:  Family History   Problem Relation Age of Onset    Diabetes Mother     Thyroid Disease Mother     Heart Disease Mother     Hypertension Father     Heart Disease Father     Diabetes Sister     Diabetes Brother        Social History:  Social History   Substance Use Topics    Smoking status: Never Smoker    Smokeless tobacco: Never Used    Alcohol use No       Allergies: Allergies   Allergen Reactions    Amoxicillin Unknown (comments)    Aricept [Donepezil] Other (comments)     Bad dreams.  Augmentin [Amoxicillin-Pot Clavulanate] Unknown (comments)    Pcn [Penicillins] Rash    Zithromax [Azithromycin] Unknown (comments)         Review of Systems   Review of Systems   Constitutional: Negative. Negative for chills and fever. HENT: Negative. Negative for congestion, facial swelling, rhinorrhea, sore throat, trouble swallowing and voice change. Eyes: Negative. Respiratory: Negative. Negative for apnea, cough, chest tightness, shortness of breath and wheezing. Cardiovascular: Negative. Negative for chest pain, palpitations and leg swelling. Gastrointestinal: Negative. Negative for abdominal distention, abdominal pain, blood in stool, constipation, diarrhea, nausea and vomiting. Endocrine: Negative. Negative for cold intolerance, heat intolerance and polyuria. Genitourinary: Negative. Negative for difficulty urinating, dysuria, flank pain, frequency, hematuria and urgency. Musculoskeletal: Negative. Negative for arthralgias, back pain, myalgias, neck pain and neck stiffness. Skin: Negative. Negative for color change and rash. Neurological: Positive for syncope and headaches. Negative for dizziness, facial asymmetry, speech difficulty, weakness, light-headedness and numbness. Positive for head trauma. Negative for LOC   Hematological: Negative. Does not bruise/bleed easily. Psychiatric/Behavioral: Negative. Negative for confusion and self-injury. The patient is not nervous/anxious. Physical Exam   Physical Exam   Constitutional: She is oriented to person, place, and time. She appears well-developed and well-nourished. No distress. HENT:   Head: Normocephalic and atraumatic. Mouth/Throat: Oropharynx is clear and moist. No oropharyngeal exudate. R occiput tenderness. No scalp hematoma or ecchymosis. Eyes: Conjunctivae and EOM are normal. Pupils are equal, round, and reactive to light. Neck: Normal range of motion. No C spine tenderness. Cardiovascular: Normal rate, regular rhythm and normal heart sounds. Exam reveals no gallop and no friction rub. No murmur heard. Pulmonary/Chest: Effort normal and breath sounds normal. No respiratory distress. She has no wheezes. She has no rales. She exhibits no tenderness. Pacemaker in L upper chest. Nontender, non erythematous. Abdominal: Soft. Bowel sounds are normal. She exhibits no distension and no mass. There is no tenderness. There is no rebound and no guarding. Musculoskeletal: Normal range of motion. She exhibits no edema, tenderness or deformity. No peripheral edema   Neurological: She is alert and oriented to person, place, and time. She displays normal reflexes. No cranial nerve deficit. She exhibits normal muscle tone. Coordination normal.   Skin: Skin is warm. No rash noted. She is not diaphoretic. Psychiatric: She has a normal mood and affect. Nursing note and vitals reviewed.       Diagnostic Study Results     Labs -     Recent Results (from the past 12 hour(s))   EKG, 12 LEAD, INITIAL    Collection Time: 12/24/17  1:29 PM   Result Value Ref Range    Ventricular Rate 64 BPM    Atrial Rate 64 BPM    P-R Interval 216 ms    QRS Duration 174 ms    Q-T Interval 468 ms    QTC Calculation (Bezet) 482 ms    Calculated P Axis 52 degrees    Calculated R Axis -46 degrees    Calculated T Axis 105 degrees    Diagnosis       Atrial-sensed ventricular-paced rhythm with prolonged AV conduction  Abnormal ECG  When compared with ECG of 06-MAR-2017 14:15,  Vent. rate has decreased BY   4 BPM     CBC WITH AUTOMATED DIFF    Collection Time: 12/24/17  2:09 PM   Result Value Ref Range    WBC 7.5 3.6 - 11.0 K/uL    RBC 4.29 3.80 - 5.20 M/uL    HGB 13.3 11.5 - 16.0 g/dL    HCT 39.5 35.0 - 47.0 %    MCV 92.1 80.0 - 99.0 FL    MCH 31.0 26.0 - 34.0 PG    MCHC 33.7 30.0 - 36.5 g/dL    RDW 14.4 11.5 - 14.5 %    PLATELET 821 149 - 632 K/uL    NEUTROPHILS 82 (H) 32 - 75 %    LYMPHOCYTES 11 (L) 12 - 49 %    MONOCYTES 7 5 - 13 %    EOSINOPHILS 0 0 - 7 %    BASOPHILS 0 0 - 1 %    ABS. NEUTROPHILS 6.2 1.8 - 8.0 K/UL    ABS. LYMPHOCYTES 0.8 0.8 - 3.5 K/UL    ABS. MONOCYTES 0.5 0.0 - 1.0 K/UL    ABS. EOSINOPHILS 0.0 0.0 - 0.4 K/UL    ABS. BASOPHILS 0.0 0.0 - 0.1 K/UL   CK W/ CKMB & INDEX    Collection Time: 12/24/17  2:09 PM   Result Value Ref Range     26 - 192 U/L    CK - MB 4.7 (H) <3.6 NG/ML    CK-MB Index 3.0 (H) 0 - 2.5     METABOLIC PANEL, COMPREHENSIVE    Collection Time: 12/24/17  2:09 PM   Result Value Ref Range    Sodium 138 136 - 145 mmol/L    Potassium 4.4 3.5 - 5.1 mmol/L    Chloride 102 97 - 108 mmol/L    CO2 31 21 - 32 mmol/L    Anion gap 5 5 - 15 mmol/L    Glucose 98 65 - 100 mg/dL    BUN 17 6 - 20 MG/DL    Creatinine 0.96 0.55 - 1.02 MG/DL    BUN/Creatinine ratio 18 12 - 20      GFR est AA >60 >60 ml/min/1.73m2    GFR est non-AA 55 (L) >60 ml/min/1.73m2    Calcium 9.1 8.5 - 10.1 MG/DL    Bilirubin, total 0.5 0.2 - 1.0 MG/DL    ALT (SGPT) 31 12 - 78 U/L    AST (SGOT) 26 15 - 37 U/L    Alk.  phosphatase 65 45 - 117 U/L    Protein, total 7.0 6.4 - 8.2 g/dL    Albumin 3.6 3.5 - 5.0 g/dL    Globulin 3.4 2.0 - 4.0 g/dL    A-G Ratio 1.1 1.1 - 2.2     MAGNESIUM    Collection Time: 12/24/17  2:09 PM   Result Value Ref Range Magnesium 2.1 1.6 - 2.4 mg/dL   PROTHROMBIN TIME + INR    Collection Time: 12/24/17  2:09 PM   Result Value Ref Range    INR 1.1 0.9 - 1.1      Prothrombin time 10.6 9.0 - 11.1 sec   PTT    Collection Time: 12/24/17  2:09 PM   Result Value Ref Range    aPTT 27.1 22.1 - 32.5 sec    aPTT, therapeutic range     58.0 - 77.0 SECS   TROPONIN I    Collection Time: 12/24/17  2:09 PM   Result Value Ref Range    Troponin-I, Qt. <0.04 <0.05 ng/mL   URINALYSIS W/ REFLEX CULTURE    Collection Time: 12/24/17  3:23 PM   Result Value Ref Range    Color YELLOW/STRAW      Appearance CLEAR CLEAR      Specific gravity 1.010 1.003 - 1.030      pH (UA) 7.5 5.0 - 8.0      Protein NEGATIVE  NEG mg/dL    Glucose NEGATIVE  NEG mg/dL    Ketone NEGATIVE  NEG mg/dL    Bilirubin NEGATIVE  NEG      Blood NEGATIVE  NEG      Urobilinogen 0.2 0.2 - 1.0 EU/dL    Nitrites NEGATIVE  NEG      Leukocyte Esterase NEGATIVE  NEG      WBC 0-4 0 - 4 /hpf    RBC 0-5 0 - 5 /hpf    Epithelial cells FEW FEW /lpf    Bacteria NEGATIVE  NEG /hpf    UA:UC IF INDICATED CULTURE NOT INDICATED BY UA RESULT CNI      Hyaline cast 0-2 0 - 5 /lpf       Radiologic Studies -   XR CHEST PA LAT   Final Result      CT HEAD WO CONT   Final Result        CT Results  (Last 48 hours)               12/24/17 1435  CT HEAD WO CONT Final result    Impression:  IMPRESSION: No acute intracranial findings. Narrative:  EXAM:  CT HEAD WO CONT       INDICATION:   Head trauma, closed, mild, GCS >= 13, no risk factors, neuro exam   normal       COMPARISON: CT 3/6/2017. CONTRAST:  None. TECHNIQUE: Unenhanced CT of the head was performed using 5 mm images. Brain and   bone windows were generated. CT dose reduction was achieved through use of a   standardized protocol tailored for this examination and automatic exposure   control for dose modulation. FINDINGS:   There is mild prominence of the ventricles and cortical sulci consistent with   atrophy.  Mild bilateral periventricular diminished attenuation is shown in the   white matter, unchanged, consistent with chronic small vessel ischemic disease   the white matter. . Intra-axial attenuation is otherwise normal. There is no   intracranial hemorrhage, extra-axial collection, mass, mass effect or midline   shift. The basilar cisterns are open. No acute infarct is identified. The bone   windows demonstrate no abnormalities. The visualized portions of the paranasal   sinuses and mastoid air cells are normal except the partly visualized left   maxillary sinus, which shows unchanged diffuse opacification. CXR Results  (Last 48 hours)               12/24/17 1424  XR CHEST PA LAT Final result    Impression:  IMPRESSION: No acute findings                       Narrative:  EXAM:  XR CHEST PA LAT       INDICATION:   syncope       COMPARISON: 12/15/2016. FINDINGS: PA and lateral radiographs of the chest demonstrate clear lungs and   pleural margins. Cardiac size is within normal limits. There is mild   atherosclerotic thoracic aortic tortuosity again noted. No hilar enlargement is   shown. A left axillary pacemaker with right atrial and ventricular leads is   again demonstrated. The bones are severely osteopenic. There are moderate mid   and lower thoracic vertebral compression fractures with vertebroplasty cement at   the thoracolumbar junction. Medical Decision Making   I am the first provider for this patient. I reviewed the vital signs, available nursing notes, past medical history, past surgical history, family history and social history. Vital Signs-Reviewed the patient's vital signs.   Patient Vitals for the past 12 hrs:   Temp Pulse Resp BP SpO2   12/24/17 1443 - 72 - 173/85 94 %   12/24/17 1440 - - - 176/81 -   12/24/17 1400 - 65 - 159/80 97 %   12/24/17 1336 98.2 °F (36.8 °C) 66 18 181/79 96 %       Pulse Oximetry Analysis - 96% on RA    Cardiac Monitor:   Rate: 67 bpm  Rhythm: Normal Sinus Rhythm      EKG interpretation: (Preliminary) 1329  Rhythm: atrial-sensed ventricular paced with prolonged AV conduction; and regular . Rate (approx.): 64; Axis: normal; NH interval: normal; QRS interval: normal ; ST/T wave: normal; Other findings: paced. Written by ASTRID Ball, as dictated by Elvis Corea MD.    Records Reviewed: Nursing Notes and Old Medical Records Pt last seen on 3/2017, also seen for syncopal episode. Provider Notes (Medical Decision Making): Ddx: electrolyte disturbance, arrhythmia, ICH, UTI  Pt is an elderly F who p/w syncope and headache. pt does have cardiac hx followed by [facility]. Will interrogate pacemaker, check ekg, cardiac enzymes, basic lab work, orthostatics, CXR, and obtain HCT. Will provide pain control and reassess. CSpine cleared per NEXUS. ED Course:   Initial assessment performed. The patients presenting problems have been discussed, and they are in agreement with the care plan formulated and outlined with them. I have encouraged them to ask questions as they arise throughout their visit. 3:45 PM  On reevaluation, patient reports feeling much improved. Updated and counseled on available results, addressed questions. Patient expresses understanding and agrees with plan. Awaiting PCP call back  Written by ASTRID Ball, as dictated by Elvis Corea MD.       Consult Note:  3:54 Donell Smith MD spoke with Vipin Navarrete MD,  Specialty: pcp o/c  Discussed pt's hx, disposition, and available diagnostic and imaging results. Reviewed care plans. Consultant agrees with plans as outlined. Dr. Vipin Navarrete states they will schedule f/u and agrees with discharge home.   Written by ASTRID Ball, as dictated by Elvis Corea MD.     4:10PM  Received report from [address] [place] [address]; no arrythmias; no issues with pacemaker    Progress Note:  Pt states she feels much better; pain resolved; denies any nausea; no new symptoms; pt able to tolerate PO and ambulate without issues; pt clinically safe for discharge home with close PCP f/u. At time of discharge, pt had stable vitals and had no questions or concerns, and was very satisfied with overall care. Critical Care Time: none    Disposition:    Discharge Note:  3:47 PM  The pt is ready for discharge. The pt's signs, symptoms, diagnosis, and discharge instructions have been discussed and pt has conveyed their understanding. The pt is to follow up as recommended or return to ER should their symptoms worsen. Plan has been discussed and pt is in agreement. PLAN:  1. Current Discharge Medication List        2. Follow-up Information     Follow up With Details Comments [address] MD   [address] [facility]  [phone]      [facility] EMERGENCY DEPT  As needed, If symptoms worsen [address] [place] [address]  [phone]        Return to ED if worse     Diagnosis     Clinical Impression:   1. Syncope and collapse    2. Fall, initial encounter    3. Contusion of head, unspecified part of head, initial encounter        Attestations: This note is prepared by Sriram Villalobos, acting as Scribe for Roxanne Coffey MD.       The scribe's documentation has been prepared under my direction and personally reviewed by me in its entirety. I confirm that the note above accurately reflects all work, treatment, procedures, and medical decision making performed by me. This note will not be viewable in [address]

## 2017-12-24 NOTE — DISCHARGE INSTRUCTIONS
Thank you! Thank you for allowing us to provide you with excellent care today. We hope we addressed all of your concerns and needs. We strive to provide excellent quality care in the Emergency Department. You will receive a survey after your visit to evaluate the care you were provided. Please rate us a level 5 (excellent), as anything less than excellent does not meet our goals. If you feel that you have not received excellent quality care or timely care, please ask to speak to the nurse manager. Please choose us in the future for your continued health care needs. ------------------------------------------------------------------------------------------------------------  The exam and treatment you received in the Emergency Department were for an urgent problem and are not intended as complete care. It is important that you follow up with a doctor, nurse practitioner, or physician assistant for ongoing care. If your symptoms become worse or you do not improve as expected and you are unable to reach your usual health care provider, you should return to the Emergency Department. We are available 24 hours a day. Please take your discharge instructions with you when you go to your follow-up appointment. If you have any problem arranging a follow-up appointment, contact the Emergency Department immediately. If a prescription has been provided, please have it filled as soon as possible to prevent a delay in treatment. Read the entire medication instruction sheet provided to you by the pharmacy. If you have any questions or reservations about taking the medication due to side effects or interactions with other medications, please call your primary care physician or contact the ER to speak with the charge nurse. Make an appointment with your family doctor or the physician you were referred to for follow-up of this visit as instructed on your discharge paperwork, as this is mandatory follow-up. Return to the ER if you are unable to be seen or if you are unable to be seen in a timely manner. If you have any problem arranging the follow-up visit, contact the Emergency Department immediately. Fainting: Care Instructions  Your Care Instructions    When you faint, or pass out, you lose consciousness for a short time. A brief drop in blood flow to the brain often causes it. When you fall or lie down, more blood flows to your brain and you regain consciousness. Emotional stress, pain, or overheating-especially if you have been standing-can make you faint. In these cases, fainting is usually not serious. But fainting can be a sign of a more serious problem. Your doctor may want you to have more tests to rule out other causes. The treatment you need depends on the reason why you fainted. The doctor has checked you carefully, but problems can develop later. If you notice any problems or new symptoms, get medical treatment right away. Follow-up care is a key part of your treatment and safety. Be sure to make and go to all appointments, and call your doctor if you are having problems. It's also a good idea to know your test results and keep a list of the medicines you take. How can you care for yourself at home? · Drink plenty of fluids to prevent dehydration. If you have kidney, heart, or liver disease and have to limit fluids, talk with your doctor before you increase your fluid intake. When should you call for help? Call 911 anytime you think you may need emergency care. For example, call if:  ? · You have symptoms of a heart problem. These may include:  ¨ Chest pain or pressure. ¨ Severe trouble breathing. ¨ A fast or irregular heartbeat. ¨ Lightheadedness or sudden weakness. ¨ Coughing up pink, foamy mucus. ¨ Passing out. After you call 911, the  may tell you to chew 1 adult-strength or 2 to 4 low-dose aspirin. Wait for an ambulance. Do not try to drive yourself.    ? · You have symptoms of a stroke. These may include:  ¨ Sudden numbness, tingling, weakness, or loss of movement in your face, arm, or leg, especially on only one side of your body. ¨ Sudden vision changes. ¨ Sudden trouble speaking. ¨ Sudden confusion or trouble understanding simple statements. ¨ Sudden problems with walking or balance. ¨ A sudden, severe headache that is different from past headaches. ? · You passed out (lost consciousness) again. ? Watch closely for changes in your health, and be sure to contact your doctor if:  ? · You do not get better as expected. Where can you learn more? Go to http://vikash-maria g.info/. Enter S690 in the search box to learn more about \"Fainting: Care Instructions. \"  Current as of: March 20, 2017  Content Version: 11.4  © 2063-0884 [employer]. Care instructions adapted under license by [employer] (which disclaims liability or warranty for this information). If you have questions about a medical condition or this instruction, always ask your healthcare professional. [place] [address] any warranty or liability for your use of this information. Preventing Falls: Care Instructions  Your Care Instructions    Getting around your home safely can be a challenge if you have injuries or health problems that make it easy for you to fall. Loose rugs and furniture in walkways are among the dangers for many older people who have problems walking or who have poor eyesight. People who have conditions such as arthritis, osteoporosis, or dementia also have to be careful not to fall. You can make your home safer with a few simple measures. Follow-up care is a key part of your treatment and safety. Be sure to make and go to all appointments, and call your doctor if you are having problems. It's also a good idea to know your test results and keep a list of the medicines you take.   How can you care for yourself at home?  Taking care of yourself  · You may get dizzy if you do not drink enough water. To prevent dehydration, drink plenty of fluids, enough so that your urine is light yellow or clear like water. Choose water and other caffeine-free clear liquids. If you have kidney, heart, or liver disease and have to limit fluids, talk with your doctor before you increase the amount of fluids you drink. · Exercise regularly to improve your strength, muscle tone, and balance. Walk if you can. Swimming may be a good choice if you cannot walk easily. · Have your vision and hearing checked each year or any time you notice a change. If you have trouble seeing and hearing, you might not be able to avoid objects and could lose your balance. · Know the side effects of the medicines you take. Ask your doctor or pharmacist whether the medicines you take can affect your balance. Sleeping pills or sedatives can affect your balance. · Limit the amount of alcohol you drink. Alcohol can impair your balance and other senses. · Ask your doctor whether calluses or corns on your feet need to be removed. If you wear loose-fitting shoes because of calluses or corns, you can lose your balance and fall. · Talk to your doctor if you have numbness in your feet. Preventing falls at home  · Remove raised doorway thresholds, throw rugs, and clutter. Repair loose carpet or raised areas in the floor. · Move furniture and electrical cords to keep them out of walking paths. · Use nonskid floor wax, and wipe up spills right away, especially on ceramic tile floors. · If you use a walker or cane, put rubber tips on it. If you use crutches, clean the bottoms of them regularly with an abrasive pad, such as steel wool. · Keep your house well lit, especially [place], and outside walkways. Use night-lights in areas such as hallways and bathrooms.  Add extra light switches or use remote switches (such as switches that go on or off when you clap your hands) to make it easier to turn lights on if you have to get up during the night. · Install sturdy handrails on stairways. · Move items in your cabinets so that the things you use a lot are on the lower shelves (about waist level). · Keep a cordless phone and a flashlight with new batteries by your bed. If possible, put a phone in each of the main rooms of your house, or carry a cell phone in case you fall and cannot reach a phone. Or, you can wear a device around your neck or wrist. You push a button that sends a signal for help. · Wear low-heeled shoes that fit well and give your feet good support. Use footwear with nonskid soles. Check the heels and soles of your shoes for wear. Repair or replace worn heels or soles. · Do not wear socks without shoes on wood floors. · Walk on the grass when the sidewalks are slippery. If you live in an area that gets snow and ice in the winter, sprinkle salt on slippery steps and sidewalks. Preventing falls in the bath  · Install grab bars and nonskid mats inside and outside your shower or tub and near the toilet and sinks. · Use shower chairs and bath benches. · Use a hand-held shower head that will allow you to sit while showering. · Get into a tub or shower by putting the weaker leg in first. Get out of a tub or shower with your strong side first.  · Repair loose toilet seats and consider installing a raised toilet seat to make getting on and off the toilet easier. · Keep your bathroom door unlocked while you are in the shower. Where can you learn more? Go to http://vikash-maria g.info/. Enter [id number] [id number] in the search box to learn more about \"Preventing Falls: Care Instructions. \"  Current as of: May 12, 2017  Content Version: 11.4  © 4670-6440 [employer]. Care instructions adapted under license by [employer] (which disclaims liability or warranty for this information).  If you have questions about a medical condition or this instruction, always ask your healthcare professional. [address] any warranty or liability for your use of this information. Head Injury: Care Instructions  Your Care Instructions    Most injuries to the head are minor. Bumps, cuts, and scrapes on the head and face usually heal well and can be treated the same as injuries to other parts of the body. Although it's rare, once in a while a more serious problem shows up after you are home. So it's good to be on the lookout for symptoms for a day or two. Follow-up care is a key part of your treatment and safety. Be sure to make and go to all appointments, and call your doctor if you are having problems. It's also a good idea to know your test results and keep a list of the medicines you take. How can you care for yourself at home? · Follow your doctor's instructions. He or she will tell you if you need someone to watch you closely for the next 24 hours or longer. · Take it easy for the next few days or more if you are not feeling well. · Ask your doctor when it's okay for you to go back to activities like driving a car, riding a bike, or operating machinery. When should you call for help? Call 911 anytime you think you may need emergency care. For example, call if:  ? · You have a seizure. ? · You passed out (lost consciousness). ? · You are confused or can't stay awake. ?Call your doctor now or seek immediate medical care if:  ? · You have new or worse vomiting. ? · You feel less alert. ? · You have new weakness or numbness in any part of your body. ? Watch closely for changes in your health, and be sure to contact your doctor if:  ? · You do not get better as expected. ? · You have new symptoms, such as headaches, trouble concentrating, or changes in mood. Where can you learn more? Go to http://vikash-maria g.info/.   Enter D918 in the search box to learn more about \"Head Injury: Care Instructions. \"  Current as of: October 14, 2016  Content Version: 11.4  © 0001-1262 [employer], [facility]. Care instructions adapted under license by [employer] (which disclaims liability or warranty for this information). If you have questions about a medical condition or this instruction, always ask your healthcare professional. [place] [address] any warranty or liability for your use of this information.

## 2017-12-25 LAB
ATRIAL RATE: 64 BPM
CALCULATED P AXIS, ECG09: 52 DEGREES
CALCULATED R AXIS, ECG10: -46 DEGREES
CALCULATED T AXIS, ECG11: 105 DEGREES
DIAGNOSIS, 93000: NORMAL
P-R INTERVAL, ECG05: 216 MS
Q-T INTERVAL, ECG07: 468 MS
QRS DURATION, ECG06: 174 MS
QTC CALCULATION (BEZET), ECG08: 482 MS
VENTRICULAR RATE, ECG03: 64 BPM

## 2017-12-25 NOTE — ED NOTES
Assisted patient in getting dressed. Patient discharge instructions reviewed with patient and son by MD Krystle Giles. Verbalized understanding. Patient escorted off unit by ED staff.

## 2017-12-25 NOTE — ED NOTES
Received call from [address] [place] [address] about pt pacemaker. Per rep, patient had not gone into any abnormal rhythms. MD Sameera Guzman notified.

## 2018-01-01 ENCOUNTER — APPOINTMENT (OUTPATIENT)
Dept: GENERAL RADIOLOGY | Age: 83
End: 2018-01-01
Attending: EMERGENCY MEDICINE
Payer: MEDICARE

## 2018-01-01 ENCOUNTER — PATIENT OUTREACH (OUTPATIENT)
Dept: INTERNAL MEDICINE CLINIC | Age: 83
End: 2018-01-01

## 2018-01-01 ENCOUNTER — OFFICE VISIT (OUTPATIENT)
Dept: INTERNAL MEDICINE CLINIC | Age: 83
End: 2018-01-01

## 2018-01-01 ENCOUNTER — PATIENT OUTREACH (OUTPATIENT)
Dept: FAMILY MEDICINE CLINIC | Age: 83
End: 2018-01-01

## 2018-01-01 ENCOUNTER — APPOINTMENT (OUTPATIENT)
Dept: GENERAL RADIOLOGY | Age: 83
DRG: 312 | End: 2018-01-01
Attending: EMERGENCY MEDICINE
Payer: MEDICARE

## 2018-01-01 ENCOUNTER — APPOINTMENT (OUTPATIENT)
Dept: NUCLEAR MEDICINE | Age: 83
DRG: 516 | End: 2018-01-01
Attending: INTERNAL MEDICINE
Payer: MEDICARE

## 2018-01-01 ENCOUNTER — HOSPITAL ENCOUNTER (OUTPATIENT)
Age: 83
Discharge: STILL ADMITTED OR ADMINISTRATIVE | End: 2018-05-31
Attending: PHYSICAL MEDICINE & REHABILITATION | Admitting: PHYSICAL MEDICINE & REHABILITATION

## 2018-01-01 ENCOUNTER — HOSPITAL ENCOUNTER (OUTPATIENT)
Dept: REHABILITATION | Age: 83
End: 2018-09-13
Attending: PHYSICAL MEDICINE & REHABILITATION | Admitting: PHYSICAL MEDICINE & REHABILITATION

## 2018-01-01 ENCOUNTER — APPOINTMENT (OUTPATIENT)
Dept: GENERAL RADIOLOGY | Age: 83
DRG: 516 | End: 2018-01-01
Attending: EMERGENCY MEDICINE
Payer: MEDICARE

## 2018-01-01 ENCOUNTER — APPOINTMENT (OUTPATIENT)
Dept: CT IMAGING | Age: 83
DRG: 516 | End: 2018-01-01
Attending: EMERGENCY MEDICINE
Payer: MEDICARE

## 2018-01-01 ENCOUNTER — OFFICE VISIT (OUTPATIENT)
Dept: CARDIOLOGY CLINIC | Age: 83
End: 2018-01-01

## 2018-01-01 ENCOUNTER — HOSPITAL ENCOUNTER (EMERGENCY)
Age: 83
Discharge: HOME | End: 2018-03-24
Attending: EMERGENCY MEDICINE
Payer: MEDICARE

## 2018-01-01 ENCOUNTER — HOSPITAL ENCOUNTER (INPATIENT)
Age: 83
LOS: 3 days | Discharge: NURSING, REHAB OR LONG-TERM CARE | DRG: 516 | End: 2018-09-06
Attending: EMERGENCY MEDICINE | Admitting: HOSPITALIST
Payer: MEDICARE

## 2018-01-01 ENCOUNTER — HOSPITAL ENCOUNTER (INPATIENT)
Age: 83
LOS: 3 days | Discharge: NURSING, REHAB OR LONG-TERM CARE | DRG: 312 | End: 2018-05-22
Attending: EMERGENCY MEDICINE | Admitting: INTERNAL MEDICINE
Payer: MEDICARE

## 2018-01-01 ENCOUNTER — APPOINTMENT (OUTPATIENT)
Dept: CT IMAGING | Age: 83
End: 2018-01-01
Attending: EMERGENCY MEDICINE
Payer: MEDICARE

## 2018-01-01 ENCOUNTER — HOSPITAL ENCOUNTER (EMERGENCY)
Age: 83
Discharge: HOME | End: 2018-11-25
Attending: EMERGENCY MEDICINE
Payer: MEDICARE

## 2018-01-01 ENCOUNTER — HOSPITAL ENCOUNTER (OUTPATIENT)
Dept: REHABILITATION | Age: 83
End: 2018-06-04
Attending: PHYSICAL MEDICINE & REHABILITATION | Admitting: PHYSICAL MEDICINE & REHABILITATION

## 2018-01-01 ENCOUNTER — APPOINTMENT (OUTPATIENT)
Dept: CT IMAGING | Age: 83
DRG: 516 | End: 2018-01-01
Attending: RADIOLOGY
Payer: MEDICARE

## 2018-01-01 ENCOUNTER — OFFICE VISIT (OUTPATIENT)
Dept: ENDOCRINOLOGY | Age: 83
End: 2018-01-01

## 2018-01-01 ENCOUNTER — APPOINTMENT (OUTPATIENT)
Dept: CT IMAGING | Age: 83
DRG: 312 | End: 2018-01-01
Attending: EMERGENCY MEDICINE
Payer: MEDICARE

## 2018-01-01 ENCOUNTER — TELEPHONE (OUTPATIENT)
Dept: INTERNAL MEDICINE CLINIC | Age: 83
End: 2018-01-01

## 2018-01-01 ENCOUNTER — APPOINTMENT (OUTPATIENT)
Dept: INTERVENTIONAL RADIOLOGY/VASCULAR | Age: 83
DRG: 516 | End: 2018-01-01
Attending: HOSPITALIST
Payer: MEDICARE

## 2018-01-01 VITALS
WEIGHT: 165 LBS | TEMPERATURE: 97.3 F | RESPIRATION RATE: 18 BRPM | DIASTOLIC BLOOD PRESSURE: 69 MMHG | HEART RATE: 75 BPM | SYSTOLIC BLOOD PRESSURE: 170 MMHG | HEIGHT: 65 IN | OXYGEN SATURATION: 94 % | BODY MASS INDEX: 27.49 KG/M2

## 2018-01-01 VITALS
WEIGHT: 174.8 LBS | HEART RATE: 76 BPM | HEIGHT: 65 IN | RESPIRATION RATE: 18 BRPM | BODY MASS INDEX: 29.12 KG/M2 | OXYGEN SATURATION: 98 % | SYSTOLIC BLOOD PRESSURE: 152 MMHG | DIASTOLIC BLOOD PRESSURE: 92 MMHG

## 2018-01-01 VITALS
RESPIRATION RATE: 18 BRPM | DIASTOLIC BLOOD PRESSURE: 82 MMHG | WEIGHT: 166 LBS | SYSTOLIC BLOOD PRESSURE: 135 MMHG | HEIGHT: 65 IN | HEART RATE: 75 BPM | TEMPERATURE: 97.8 F | BODY MASS INDEX: 27.66 KG/M2 | OXYGEN SATURATION: 99 %

## 2018-01-01 VITALS
HEART RATE: 75 BPM | RESPIRATION RATE: 16 BRPM | TEMPERATURE: 98.4 F | DIASTOLIC BLOOD PRESSURE: 83 MMHG | WEIGHT: 171.3 LBS | HEIGHT: 65 IN | BODY MASS INDEX: 28.54 KG/M2 | SYSTOLIC BLOOD PRESSURE: 153 MMHG | OXYGEN SATURATION: 92 %

## 2018-01-01 VITALS
DIASTOLIC BLOOD PRESSURE: 92 MMHG | RESPIRATION RATE: 16 BRPM | HEIGHT: 65 IN | HEART RATE: 78 BPM | SYSTOLIC BLOOD PRESSURE: 187 MMHG | WEIGHT: 166.2 LBS | TEMPERATURE: 98.6 F | BODY MASS INDEX: 27.69 KG/M2 | OXYGEN SATURATION: 97 %

## 2018-01-01 VITALS
SYSTOLIC BLOOD PRESSURE: 152 MMHG | RESPIRATION RATE: 18 BRPM | OXYGEN SATURATION: 94 % | TEMPERATURE: 97.7 F | HEIGHT: 65 IN | HEART RATE: 75 BPM | WEIGHT: 173.2 LBS | BODY MASS INDEX: 28.86 KG/M2 | DIASTOLIC BLOOD PRESSURE: 83 MMHG

## 2018-01-01 VITALS — HEIGHT: 65 IN | WEIGHT: 165 LBS | BODY MASS INDEX: 27.49 KG/M2

## 2018-01-01 VITALS
SYSTOLIC BLOOD PRESSURE: 115 MMHG | TEMPERATURE: 97.7 F | BODY MASS INDEX: 27.22 KG/M2 | OXYGEN SATURATION: 99 % | DIASTOLIC BLOOD PRESSURE: 71 MMHG | HEIGHT: 65 IN | RESPIRATION RATE: 18 BRPM | WEIGHT: 163.4 LBS | HEART RATE: 75 BPM

## 2018-01-01 VITALS
BODY MASS INDEX: 26.86 KG/M2 | HEART RATE: 76 BPM | HEIGHT: 65 IN | WEIGHT: 161.2 LBS | DIASTOLIC BLOOD PRESSURE: 58 MMHG | SYSTOLIC BLOOD PRESSURE: 108 MMHG

## 2018-01-01 VITALS
RESPIRATION RATE: 18 BRPM | HEART RATE: 75 BPM | WEIGHT: 167 LBS | HEIGHT: 65 IN | DIASTOLIC BLOOD PRESSURE: 86 MMHG | OXYGEN SATURATION: 92 % | SYSTOLIC BLOOD PRESSURE: 126 MMHG | BODY MASS INDEX: 27.82 KG/M2

## 2018-01-01 VITALS
SYSTOLIC BLOOD PRESSURE: 129 MMHG | HEART RATE: 64 BPM | OXYGEN SATURATION: 95 % | TEMPERATURE: 97.9 F | RESPIRATION RATE: 12 BRPM | DIASTOLIC BLOOD PRESSURE: 58 MMHG

## 2018-01-01 VITALS
OXYGEN SATURATION: 94 % | RESPIRATION RATE: 18 BRPM | TEMPERATURE: 98.2 F | SYSTOLIC BLOOD PRESSURE: 186 MMHG | DIASTOLIC BLOOD PRESSURE: 89 MMHG

## 2018-01-01 DIAGNOSIS — I10 ESSENTIAL HYPERTENSION, BENIGN: Primary | ICD-10-CM

## 2018-01-01 DIAGNOSIS — I65.23 STENOSIS OF BOTH INTERNAL CAROTID ARTERIES: ICD-10-CM

## 2018-01-01 DIAGNOSIS — S82.301D CLOSED FRACTURE OF DISTAL END OF RIGHT TIBIA WITH ROUTINE HEALING, UNSPECIFIED FRACTURE MORPHOLOGY, SUBSEQUENT ENCOUNTER: Primary | ICD-10-CM

## 2018-01-01 DIAGNOSIS — R55 SYNCOPE, UNSPECIFIED SYNCOPE TYPE: ICD-10-CM

## 2018-01-01 DIAGNOSIS — R55 SYNCOPE AND COLLAPSE: ICD-10-CM

## 2018-01-01 DIAGNOSIS — Z00.00 MEDICARE ANNUAL WELLNESS VISIT, SUBSEQUENT: Primary | ICD-10-CM

## 2018-01-01 DIAGNOSIS — Z95.0 CARDIAC PACEMAKER IN SITU: ICD-10-CM

## 2018-01-01 DIAGNOSIS — M48.54XA COLLAPSE OF THORACIC VERTEBRA (HCC): ICD-10-CM

## 2018-01-01 DIAGNOSIS — Z23 ENCOUNTER FOR IMMUNIZATION: Primary | ICD-10-CM

## 2018-01-01 DIAGNOSIS — F02.80 ALZHEIMER'S DEMENTIA WITHOUT BEHAVIORAL DISTURBANCE, UNSPECIFIED TIMING OF DEMENTIA ONSET: ICD-10-CM

## 2018-01-01 DIAGNOSIS — S32.010S CLOSED COMPRESSION FRACTURE OF FIRST LUMBAR VERTEBRA, SEQUELA: ICD-10-CM

## 2018-01-01 DIAGNOSIS — R53.1 WEAKNESS: Primary | ICD-10-CM

## 2018-01-01 DIAGNOSIS — R26.2 DIFFICULTY WALKING: ICD-10-CM

## 2018-01-01 DIAGNOSIS — G30.9 ALZHEIMER'S DEMENTIA WITHOUT BEHAVIORAL DISTURBANCE, UNSPECIFIED TIMING OF DEMENTIA ONSET: ICD-10-CM
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
## 2018-01-01 DIAGNOSIS — S32.019A CLOSED FRACTURE OF FIRST LUMBAR VERTEBRA, UNSPECIFIED FRACTURE MORPHOLOGY, INITIAL ENCOUNTER (HCC): ICD-10-CM

## 2018-01-01 DIAGNOSIS — S82.454A CLOSED NONDISPLACED COMMINUTED FRACTURE OF SHAFT OF RIGHT FIBULA, INITIAL ENCOUNTER: Primary | ICD-10-CM

## 2018-01-01 DIAGNOSIS — S82.831D CLOSED FRACTURE OF PROXIMAL END OF RIGHT FIBULA WITH ROUTINE HEALING, UNSPECIFIED FRACTURE MORPHOLOGY, SUBSEQUENT ENCOUNTER: ICD-10-CM

## 2018-01-01 DIAGNOSIS — I10 ESSENTIAL HYPERTENSION: ICD-10-CM

## 2018-01-01 DIAGNOSIS — S32.010A CLOSED COMPRESSION FRACTURE OF FIRST LUMBAR VERTEBRA, INITIAL ENCOUNTER: Primary | ICD-10-CM

## 2018-01-01 DIAGNOSIS — M25.511 RIGHT SHOULDER PAIN, UNSPECIFIED CHRONICITY: Primary | ICD-10-CM

## 2018-01-01 DIAGNOSIS — E03.9 ACQUIRED HYPOTHYROIDISM: ICD-10-CM

## 2018-01-01 DIAGNOSIS — E89.0 POSTABLATIVE HYPOTHYROIDISM: Primary | ICD-10-CM

## 2018-01-01 DIAGNOSIS — F41.1 GENERALIZED ANXIETY DISORDER: ICD-10-CM

## 2018-01-01 DIAGNOSIS — M54.5 MIDLINE LOW BACK PAIN, UNSPECIFIED CHRONICITY, WITH SCIATICA PRESENCE UNSPECIFIED: ICD-10-CM

## 2018-01-01 DIAGNOSIS — R54 SENILE DEBILITY: ICD-10-CM

## 2018-01-01 DIAGNOSIS — S40.021A CONTUSION OF RIGHT UPPER EXTREMITY, INITIAL ENCOUNTER: Primary | ICD-10-CM

## 2018-01-01 DIAGNOSIS — R55 VASOVAGAL SYNDROME: ICD-10-CM

## 2018-01-01 DIAGNOSIS — G62.9 NEUROPATHY: ICD-10-CM

## 2018-01-01 DIAGNOSIS — M25.511 ACUTE PAIN OF RIGHT SHOULDER: Primary | ICD-10-CM

## 2018-01-01 DIAGNOSIS — E78.2 MIXED HYPERLIPIDEMIA: Chronic | ICD-10-CM

## 2018-01-01 DIAGNOSIS — R55 SYNCOPE AND COLLAPSE: Primary | ICD-10-CM

## 2018-01-01 DIAGNOSIS — I95.1 SYNCOPE DUE TO ORTHOSTATIC HYPOTENSION: ICD-10-CM

## 2018-01-01 DIAGNOSIS — I95.1 SYNCOPE DUE TO ORTHOSTATIC HYPOTENSION: Primary | ICD-10-CM

## 2018-01-01 LAB
25(OH)D3 SERPL-MCNC: 56.2 NG/ML (ref 30–100)
ALBUMIN SERPL-MCNC: 3 G/DL (ref 3.5–5)
ALBUMIN SERPL-MCNC: 3.5 G/DL (ref 3.5–5)
ALBUMIN/GLOB SERPL: 0.9 {RATIO} (ref 1.1–2.2)
ALBUMIN/GLOB SERPL: 1.1 {RATIO} (ref 1.1–2.2)
ALP SERPL-CCNC: 71 U/L (ref 45–117)
ALP SERPL-CCNC: 80 U/L (ref 45–117)
ALT SERPL-CCNC: 24 U/L (ref 12–78)
ALT SERPL-CCNC: 25 U/L (ref 12–78)
ANION GAP SERPL CALC-SCNC: 2 MMOL/L (ref 5–15)
ANION GAP SERPL CALC-SCNC: 4 MMOL/L (ref 5–15)
ANION GAP SERPL CALC-SCNC: 5 MMOL/L (ref 5–15)
ANION GAP SERPL CALC-SCNC: 6 MMOL/L (ref 5–15)
ANION GAP SERPL CALC-SCNC: 7 MMOL/L (ref 5–15)
ANION GAP SERPL CALC-SCNC: 9 MMOL/L (ref 5–15)
APPEARANCE UR: ABNORMAL
APPEARANCE UR: CLEAR
APPEARANCE UR: CLEAR
AST SERPL-CCNC: 20 U/L (ref 15–37)
AST SERPL-CCNC: 25 U/L (ref 15–37)
ATRIAL RATE: 72 BPM
ATRIAL RATE: 75 BPM
BACTERIA SPEC CULT: ABNORMAL
BACTERIA SPEC CULT: NORMAL
BACTERIA URNS QL MICRO: NEGATIVE /HPF
BASOPHILS # BLD: 0 K/UL (ref 0–0.1)
BASOPHILS # BLD: 0.1 K/UL (ref 0–0.1)
BASOPHILS NFR BLD: 0 % (ref 0–1)
BASOPHILS NFR BLD: 0 % (ref 0–1)
BASOPHILS NFR BLD: 1 % (ref 0–1)
BASOPHILS NFR BLD: 1 % (ref 0–1)
BILIRUB SERPL-MCNC: 0.5 MG/DL (ref 0.2–1)
BILIRUB SERPL-MCNC: 0.8 MG/DL (ref 0.2–1)
BILIRUB UR QL: NEGATIVE
BUN SERPL-MCNC: 13 MG/DL (ref 6–20)
BUN SERPL-MCNC: 14 MG/DL (ref 6–20)
BUN SERPL-MCNC: 16 MG/DL (ref 6–20)
BUN SERPL-MCNC: 18 MG/DL (ref 6–20)
BUN SERPL-MCNC: 21 MG/DL (ref 6–20)
BUN/CREAT SERPL: 17 (ref 12–20)
BUN/CREAT SERPL: 18 (ref 12–20)
BUN/CREAT SERPL: 19 (ref 12–20)
BUN/CREAT SERPL: 20 (ref 12–20)
BUN/CREAT SERPL: 21 (ref 12–20)
BUN/CREAT SERPL: 22 (ref 12–20)
BUN/CREAT SERPL: 26 (ref 12–20)
BUN/CREAT SERPL: 29 (ref 12–20)
CALCIUM SERPL-MCNC: 8.6 MG/DL (ref 8.5–10.1)
CALCIUM SERPL-MCNC: 8.6 MG/DL (ref 8.5–10.1)
CALCIUM SERPL-MCNC: 8.8 MG/DL (ref 8.5–10.1)
CALCIUM SERPL-MCNC: 9 MG/DL (ref 8.5–10.1)
CALCIUM SERPL-MCNC: 9 MG/DL (ref 8.5–10.1)
CALCIUM SERPL-MCNC: 9.2 MG/DL (ref 8.5–10.1)
CALCIUM SERPL-MCNC: 9.3 MG/DL (ref 8.5–10.1)
CALCIUM SERPL-MCNC: 9.7 MG/DL (ref 8.5–10.1)
CALCULATED P AXIS, ECG09: 48 DEGREES
CALCULATED R AXIS, ECG10: -56 DEGREES
CALCULATED R AXIS, ECG10: -57 DEGREES
CALCULATED T AXIS, ECG11: 101 DEGREES
CALCULATED T AXIS, ECG11: 108 DEGREES
CC UR VC: ABNORMAL
CC UR VC: NORMAL
CHLORIDE SERPL-SCNC: 101 MMOL/L (ref 97–108)
CHLORIDE SERPL-SCNC: 102 MMOL/L (ref 97–108)
CHLORIDE SERPL-SCNC: 104 MMOL/L (ref 97–108)
CHLORIDE SERPL-SCNC: 104 MMOL/L (ref 97–108)
CHLORIDE SERPL-SCNC: 98 MMOL/L (ref 97–108)
CO2 SERPL-SCNC: 26 MMOL/L (ref 21–32)
CO2 SERPL-SCNC: 27 MMOL/L (ref 21–32)
CO2 SERPL-SCNC: 28 MMOL/L (ref 21–32)
CO2 SERPL-SCNC: 28 MMOL/L (ref 21–32)
CO2 SERPL-SCNC: 29 MMOL/L (ref 21–32)
CO2 SERPL-SCNC: 29 MMOL/L (ref 21–32)
CO2 SERPL-SCNC: 30 MMOL/L (ref 21–32)
CO2 SERPL-SCNC: 32 MMOL/L (ref 21–32)
COLOR UR: ABNORMAL
COLOR UR: NORMAL
CREAT SERPL-MCNC: 0.62 MG/DL (ref 0.55–1.02)
CREAT SERPL-MCNC: 0.7 MG/DL (ref 0.55–1.02)
CREAT SERPL-MCNC: 0.72 MG/DL (ref 0.55–1.02)
CREAT SERPL-MCNC: 0.73 MG/DL (ref 0.55–1.02)
CREAT SERPL-MCNC: 0.74 MG/DL (ref 0.55–1.02)
CREAT SERPL-MCNC: 0.8 MG/DL (ref 0.55–1.02)
CREAT SERPL-MCNC: 0.87 MG/DL (ref 0.55–1.02)
CREAT SERPL-MCNC: 0.92 MG/DL (ref 0.55–1.02)
DIAGNOSIS, 93000: NORMAL
DIAGNOSIS, 93000: NORMAL
DIFFERENTIAL METHOD BLD: ABNORMAL
DIFFERENTIAL METHOD BLD: ABNORMAL
DIFFERENTIAL METHOD BLD: NORMAL
DIFFERENTIAL METHOD BLD: NORMAL
EOSINOPHIL # BLD: 0 K/UL (ref 0–0.4)
EOSINOPHIL # BLD: 0.1 K/UL (ref 0–0.4)
EOSINOPHIL # BLD: 0.2 K/UL (ref 0–0.4)
EOSINOPHIL # BLD: 0.3 K/UL (ref 0–0.4)
EOSINOPHIL NFR BLD: 0 % (ref 0–7)
EOSINOPHIL NFR BLD: 2 % (ref 0–7)
EOSINOPHIL NFR BLD: 4 % (ref 0–7)
EOSINOPHIL NFR BLD: 6 % (ref 0–7)
EPITH CASTS URNS QL MICRO: ABNORMAL /LPF
ERYTHROCYTE [DISTWIDTH] IN BLOOD BY AUTOMATED COUNT: 13 % (ref 11.5–14.5)
ERYTHROCYTE [DISTWIDTH] IN BLOOD BY AUTOMATED COUNT: 13.5 % (ref 11.5–14.5)
ERYTHROCYTE [DISTWIDTH] IN BLOOD BY AUTOMATED COUNT: 13.6 % (ref 11.5–14.5)
ERYTHROCYTE [DISTWIDTH] IN BLOOD BY AUTOMATED COUNT: 13.6 % (ref 11.5–14.5)
ERYTHROCYTE [DISTWIDTH] IN BLOOD BY AUTOMATED COUNT: 13.7 % (ref 11.5–14.5)
GLOBULIN SER CALC-MCNC: 3.3 G/DL (ref 2–4)
GLOBULIN SER CALC-MCNC: 3.5 G/DL (ref 2–4)
GLUCOSE SERPL-MCNC: 106 MG/DL (ref 65–100)
GLUCOSE SERPL-MCNC: 84 MG/DL (ref 65–100)
GLUCOSE SERPL-MCNC: 87 MG/DL (ref 65–100)
GLUCOSE SERPL-MCNC: 89 MG/DL (ref 65–100)
GLUCOSE SERPL-MCNC: 90 MG/DL (ref 65–100)
GLUCOSE SERPL-MCNC: 93 MG/DL (ref 65–100)
GLUCOSE SERPL-MCNC: 96 MG/DL (ref 65–100)
GLUCOSE SERPL-MCNC: 99 MG/DL (ref 65–100)
GLUCOSE UR STRIP.AUTO-MCNC: NEGATIVE MG/DL
HCT VFR BLD AUTO: 37.9 % (ref 35–47)
HCT VFR BLD AUTO: 38.4 % (ref 35–47)
HCT VFR BLD AUTO: 38.6 % (ref 35–47)
HCT VFR BLD AUTO: 38.7 % (ref 35–47)
HCT VFR BLD AUTO: 39.5 % (ref 35–47)
HCT VFR BLD AUTO: 40.9 % (ref 35–47)
HCT VFR BLD AUTO: 41.3 % (ref 35–47)
HGB BLD-MCNC: 12.7 G/DL (ref 11.5–16)
HGB BLD-MCNC: 12.9 G/DL (ref 11.5–16)
HGB BLD-MCNC: 13.1 G/DL (ref 11.5–16)
HGB BLD-MCNC: 13.5 G/DL (ref 11.5–16)
HGB BLD-MCNC: 13.8 G/DL (ref 11.5–16)
HGB UR QL STRIP: ABNORMAL
HGB UR QL STRIP: ABNORMAL
HGB UR QL STRIP: NEGATIVE
HYALINE CASTS URNS QL MICRO: ABNORMAL /LPF (ref 0–5)
IMM GRANULOCYTES # BLD: 0 K/UL (ref 0–0.04)
IMM GRANULOCYTES NFR BLD AUTO: 0 % (ref 0–0.5)
KETONES UR QL STRIP.AUTO: NEGATIVE MG/DL
LEUKOCYTE ESTERASE UR QL STRIP.AUTO: ABNORMAL
LEUKOCYTE ESTERASE UR QL STRIP.AUTO: NEGATIVE
LYMPHOCYTES # BLD: 0.8 K/UL (ref 0.8–3.5)
LYMPHOCYTES # BLD: 0.8 K/UL (ref 0.8–3.5)
LYMPHOCYTES # BLD: 1.1 K/UL (ref 0.8–3.5)
LYMPHOCYTES # BLD: 1.5 K/UL (ref 0.8–3.5)
LYMPHOCYTES NFR BLD: 11 % (ref 12–49)
LYMPHOCYTES NFR BLD: 14 % (ref 12–49)
LYMPHOCYTES NFR BLD: 16 % (ref 12–49)
LYMPHOCYTES NFR BLD: 25 % (ref 12–49)
MAGNESIUM SERPL-MCNC: 2.1 MG/DL (ref 1.6–2.4)
MCH RBC QN AUTO: 30.9 PG (ref 26–34)
MCH RBC QN AUTO: 31 PG (ref 26–34)
MCH RBC QN AUTO: 31.2 PG (ref 26–34)
MCH RBC QN AUTO: 31.3 PG (ref 26–34)
MCH RBC QN AUTO: 31.3 PG (ref 26–34)
MCH RBC QN AUTO: 31.4 PG (ref 26–34)
MCH RBC QN AUTO: 31.5 PG (ref 26–34)
MCHC RBC AUTO-ENTMCNC: 32.7 G/DL (ref 30–36.5)
MCHC RBC AUTO-ENTMCNC: 33.2 G/DL (ref 30–36.5)
MCHC RBC AUTO-ENTMCNC: 33.5 G/DL (ref 30–36.5)
MCHC RBC AUTO-ENTMCNC: 33.6 G/DL (ref 30–36.5)
MCHC RBC AUTO-ENTMCNC: 33.7 G/DL (ref 30–36.5)
MCHC RBC AUTO-ENTMCNC: 33.9 G/DL (ref 30–36.5)
MCHC RBC AUTO-ENTMCNC: 33.9 G/DL (ref 30–36.5)
MCV RBC AUTO: 92.3 FL (ref 80–99)
MCV RBC AUTO: 92.7 FL (ref 80–99)
MCV RBC AUTO: 92.8 FL (ref 80–99)
MCV RBC AUTO: 92.8 FL (ref 80–99)
MCV RBC AUTO: 93.2 FL (ref 80–99)
MCV RBC AUTO: 94 FL (ref 80–99)
MCV RBC AUTO: 94.9 FL (ref 80–99)
MONOCYTES # BLD: 0.4 K/UL (ref 0–1)
MONOCYTES # BLD: 0.4 K/UL (ref 0–1)
MONOCYTES # BLD: 0.5 K/UL (ref 0–1)
MONOCYTES # BLD: 0.5 K/UL (ref 0–1)
MONOCYTES NFR BLD: 7 % (ref 5–13)
NEUTS SEG # BLD: 3.7 K/UL (ref 1.8–8)
NEUTS SEG # BLD: 4.4 K/UL (ref 1.8–8)
NEUTS SEG # BLD: 4.7 K/UL (ref 1.8–8)
NEUTS SEG # BLD: 6.2 K/UL (ref 1.8–8)
NEUTS SEG NFR BLD: 62 % (ref 32–75)
NEUTS SEG NFR BLD: 72 % (ref 32–75)
NEUTS SEG NFR BLD: 76 % (ref 32–75)
NEUTS SEG NFR BLD: 82 % (ref 32–75)
NITRITE UR QL STRIP.AUTO: NEGATIVE
NRBC # BLD: 0 K/UL (ref 0–0.01)
NRBC BLD-RTO: 0 PER 100 WBC
P-R INTERVAL, ECG05: 230 MS
P-R INTERVAL, ECG05: 236 MS
PH UR STRIP: 7 [PH] (ref 5–8)
PH UR STRIP: 7.5 [PH] (ref 5–8)
PH UR STRIP: 7.5 [PH] (ref 5–8)
PH UR STRIP: 8 [PH] (ref 5–8)
PH UR STRIP: 8 [PH] (ref 5–8)
PLATELET # BLD AUTO: 192 K/UL (ref 150–400)
PLATELET # BLD AUTO: 201 K/UL (ref 150–400)
PLATELET # BLD AUTO: 207 K/UL (ref 150–400)
PLATELET # BLD AUTO: 221 K/UL (ref 150–400)
PLATELET # BLD AUTO: 227 K/UL (ref 150–400)
PLATELET # BLD AUTO: 246 K/UL (ref 150–400)
PLATELET # BLD AUTO: 276 K/UL (ref 150–400)
PMV BLD AUTO: 10.2 FL (ref 8.9–12.9)
PMV BLD AUTO: 9.4 FL (ref 8.9–12.9)
PMV BLD AUTO: 9.5 FL (ref 8.9–12.9)
PMV BLD AUTO: 9.5 FL (ref 8.9–12.9)
PMV BLD AUTO: 9.6 FL (ref 8.9–12.9)
POTASSIUM SERPL-SCNC: 3.8 MMOL/L (ref 3.5–5.1)
POTASSIUM SERPL-SCNC: 3.9 MMOL/L (ref 3.5–5.1)
POTASSIUM SERPL-SCNC: 4 MMOL/L (ref 3.5–5.1)
POTASSIUM SERPL-SCNC: 4.1 MMOL/L (ref 3.5–5.1)
POTASSIUM SERPL-SCNC: 4.2 MMOL/L (ref 3.5–5.1)
POTASSIUM SERPL-SCNC: 4.2 MMOL/L (ref 3.5–5.1)
POTASSIUM SERPL-SCNC: 4.3 MMOL/L (ref 3.5–5.1)
POTASSIUM SERPL-SCNC: 4.3 MMOL/L (ref 3.5–5.1)
PROT SERPL-MCNC: 6.5 G/DL (ref 6.4–8.2)
PROT SERPL-MCNC: 6.8 G/DL (ref 6.4–8.2)
PROT UR STRIP-MCNC: NEGATIVE MG/DL
Q-T INTERVAL, ECG07: 416 MS
Q-T INTERVAL, ECG07: 430 MS
QRS DURATION, ECG06: 146 MS
QRS DURATION, ECG06: 152 MS
QTC CALCULATION (BEZET), ECG08: 464 MS
QTC CALCULATION (BEZET), ECG08: 470 MS
RBC # BLD AUTO: 4.03 M/UL (ref 3.8–5.2)
RBC # BLD AUTO: 4.14 M/UL (ref 3.8–5.2)
RBC # BLD AUTO: 4.17 M/UL (ref 3.8–5.2)
RBC # BLD AUTO: 4.18 M/UL (ref 3.8–5.2)
RBC # BLD AUTO: 4.24 M/UL (ref 3.8–5.2)
RBC # BLD AUTO: 4.35 M/UL (ref 3.8–5.2)
RBC # BLD AUTO: 4.41 M/UL (ref 3.8–5.2)
RBC #/AREA URNS HPF: ABNORMAL /HPF (ref 0–5)
RBC MORPH BLD: ABNORMAL
SERVICE CMNT-IMP: ABNORMAL
SERVICE CMNT-IMP: NORMAL
SODIUM SERPL-SCNC: 132 MMOL/L (ref 136–145)
SODIUM SERPL-SCNC: 135 MMOL/L (ref 136–145)
SODIUM SERPL-SCNC: 136 MMOL/L (ref 136–145)
SODIUM SERPL-SCNC: 136 MMOL/L (ref 136–145)
SODIUM SERPL-SCNC: 137 MMOL/L (ref 136–145)
SODIUM SERPL-SCNC: 139 MMOL/L (ref 136–145)
SP GR UR REFRACTOMETRY: 1.01 (ref 1–1.03)
T4 FREE SERPL-MCNC: 0.8 NG/DL (ref 0.82–1.77)
TROPONIN I SERPL-MCNC: <0.04 NG/ML
TROPONIN I SERPL-MCNC: <0.04 NG/ML
TSH SERPL DL<=0.005 MIU/L-ACNC: 3.53 UIU/ML (ref 0.45–4.5)
UA: UC IF INDICATED,UAUC: ABNORMAL
UA: UC IF INDICATED,UAUC: ABNORMAL
UROBILINOGEN UR QL STRIP.AUTO: 0.2 EU/DL (ref 0.2–1)
VENTRICULAR RATE, ECG03: 72 BPM
VENTRICULAR RATE, ECG03: 75 BPM
WBC # BLD AUTO: 5.8 K/UL (ref 3.6–11)
WBC # BLD AUTO: 6 K/UL (ref 3.6–11)
WBC # BLD AUTO: 6.2 K/UL (ref 3.6–11)
WBC # BLD AUTO: 6.5 K/UL (ref 3.6–11)
WBC # BLD AUTO: 7 K/UL (ref 3.6–11)
WBC # BLD AUTO: 7.6 K/UL (ref 3.6–11)
WBC # BLD AUTO: 8 K/UL (ref 3.6–11)
WBC URNS QL MICRO: ABNORMAL /HPF (ref 0–4)

## 2018-01-01 PROCEDURE — 65270000029 HC RM PRIVATE

## 2018-01-01 PROCEDURE — 73590 X-RAY EXAM OF LOWER LEG: CPT

## 2018-01-01 PROCEDURE — 80048 BASIC METABOLIC PNL TOTAL CA: CPT

## 2018-01-01 PROCEDURE — 74011250637 HC RX REV CODE- 250/637: Performed by: PHYSICAL MEDICINE & REHABILITATION

## 2018-01-01 PROCEDURE — 36415 COLL VENOUS BLD VENIPUNCTURE: CPT | Performed by: EMERGENCY MEDICINE

## 2018-01-01 PROCEDURE — 99285 EMERGENCY DEPT VISIT HI MDM: CPT

## 2018-01-01 PROCEDURE — 77030021783 HC SYS CEM DEL MEDT -D

## 2018-01-01 PROCEDURE — 77030038269 HC DRN EXT URIN PURWCK BARD -A

## 2018-01-01 PROCEDURE — 72125 CT NECK SPINE W/O DYE: CPT

## 2018-01-01 PROCEDURE — 94761 N-INVAS EAR/PLS OXIMETRY MLT: CPT

## 2018-01-01 PROCEDURE — 93306 TTE W/DOPPLER COMPLETE: CPT

## 2018-01-01 PROCEDURE — 36415 COLL VENOUS BLD VENIPUNCTURE: CPT | Performed by: PHYSICAL MEDICINE & REHABILITATION

## 2018-01-01 PROCEDURE — 77030032490 HC SLV COMPR SCD KNE COVD -B

## 2018-01-01 PROCEDURE — 97116 GAIT TRAINING THERAPY: CPT

## 2018-01-01 PROCEDURE — 94760 N-INVAS EAR/PLS OXIMETRY 1: CPT

## 2018-01-01 PROCEDURE — 85027 COMPLETE CBC AUTOMATED: CPT | Performed by: PHYSICAL MEDICINE & REHABILITATION

## 2018-01-01 PROCEDURE — 51798 US URINE CAPACITY MEASURE: CPT

## 2018-01-01 PROCEDURE — 74011250637 HC RX REV CODE- 250/637: Performed by: INTERNAL MEDICINE

## 2018-01-01 PROCEDURE — 74011250636 HC RX REV CODE- 250/636: Performed by: PHYSICAL MEDICINE & REHABILITATION

## 2018-01-01 PROCEDURE — 71045 X-RAY EXAM CHEST 1 VIEW: CPT

## 2018-01-01 PROCEDURE — 85025 COMPLETE CBC W/AUTO DIFF WBC: CPT | Performed by: HOSPITALIST

## 2018-01-01 PROCEDURE — 81001 URINALYSIS AUTO W/SCOPE: CPT | Performed by: PHYSICAL MEDICINE & REHABILITATION

## 2018-01-01 PROCEDURE — 74011000250 HC RX REV CODE- 250: Performed by: RADIOLOGY

## 2018-01-01 PROCEDURE — 74011250637 HC RX REV CODE- 250/637: Performed by: EMERGENCY MEDICINE

## 2018-01-01 PROCEDURE — 93005 ELECTROCARDIOGRAM TRACING: CPT

## 2018-01-01 PROCEDURE — 82306 VITAMIN D 25 HYDROXY: CPT | Performed by: PHYSICAL MEDICINE & REHABILITATION

## 2018-01-01 PROCEDURE — 0PU43JZ SUPPLEMENT THORACIC VERTEBRA WITH SYNTHETIC SUBSTITUTE, PERCUTANEOUS APPROACH: ICD-10-PCS | Performed by: RADIOLOGY

## 2018-01-01 PROCEDURE — 96374 THER/PROPH/DIAG INJ IV PUSH: CPT

## 2018-01-01 PROCEDURE — 72110 X-RAY EXAM L-2 SPINE 4/>VWS: CPT

## 2018-01-01 PROCEDURE — C1713 ANCHOR/SCREW BN/BN,TIS/BN: HCPCS

## 2018-01-01 PROCEDURE — G8987 SELF CARE CURRENT STATUS: HCPCS | Performed by: OCCUPATIONAL THERAPIST

## 2018-01-01 PROCEDURE — 74011250637 HC RX REV CODE- 250/637: Performed by: PHYSICIAN ASSISTANT

## 2018-01-01 PROCEDURE — 77030034842 HC TAMP SPN BN INFL EXP II KYPH -I

## 2018-01-01 PROCEDURE — 81001 URINALYSIS AUTO W/SCOPE: CPT | Performed by: HOSPITALIST

## 2018-01-01 PROCEDURE — 97535 SELF CARE MNGMENT TRAINING: CPT | Performed by: OCCUPATIONAL THERAPIST

## 2018-01-01 PROCEDURE — 81003 URINALYSIS AUTO W/O SCOPE: CPT

## 2018-01-01 PROCEDURE — 80048 BASIC METABOLIC PNL TOTAL CA: CPT | Performed by: PHYSICAL MEDICINE & REHABILITATION

## 2018-01-01 PROCEDURE — 84484 ASSAY OF TROPONIN QUANT: CPT | Performed by: EMERGENCY MEDICINE

## 2018-01-01 PROCEDURE — 74011250636 HC RX REV CODE- 250/636: Performed by: RADIOLOGY

## 2018-01-01 PROCEDURE — 80048 BASIC METABOLIC PNL TOTAL CA: CPT | Performed by: HOSPITALIST

## 2018-01-01 PROCEDURE — 81001 URINALYSIS AUTO W/SCOPE: CPT | Performed by: EMERGENCY MEDICINE

## 2018-01-01 PROCEDURE — 36415 COLL VENOUS BLD VENIPUNCTURE: CPT | Performed by: INTERNAL MEDICINE

## 2018-01-01 PROCEDURE — 71046 X-RAY EXAM CHEST 2 VIEWS: CPT

## 2018-01-01 PROCEDURE — 74011250636 HC RX REV CODE- 250/636: Performed by: INTERNAL MEDICINE

## 2018-01-01 PROCEDURE — 77010033678 HC OXYGEN DAILY

## 2018-01-01 PROCEDURE — 80048 BASIC METABOLIC PNL TOTAL CA: CPT | Performed by: EMERGENCY MEDICINE

## 2018-01-01 PROCEDURE — 74011000250 HC RX REV CODE- 250: Performed by: HOSPITALIST

## 2018-01-01 PROCEDURE — 97165 OT EVAL LOW COMPLEX 30 MIN: CPT | Performed by: OCCUPATIONAL THERAPIST

## 2018-01-01 PROCEDURE — 87086 URINE CULTURE/COLONY COUNT: CPT | Performed by: PHYSICAL MEDICINE & REHABILITATION

## 2018-01-01 PROCEDURE — 99153 MOD SED SAME PHYS/QHP EA: CPT

## 2018-01-01 PROCEDURE — 80053 COMPREHEN METABOLIC PANEL: CPT | Performed by: INTERNAL MEDICINE

## 2018-01-01 PROCEDURE — 97530 THERAPEUTIC ACTIVITIES: CPT

## 2018-01-01 PROCEDURE — A9503 TC99M MEDRONATE: HCPCS

## 2018-01-01 PROCEDURE — 74011250636 HC RX REV CODE- 250/636: Performed by: HOSPITALIST

## 2018-01-01 PROCEDURE — G8988 SELF CARE GOAL STATUS: HCPCS | Performed by: OCCUPATIONAL THERAPIST

## 2018-01-01 PROCEDURE — 97161 PT EVAL LOW COMPLEX 20 MIN: CPT

## 2018-01-01 PROCEDURE — 74011250636 HC RX REV CODE- 250/636

## 2018-01-01 PROCEDURE — 77030003666 HC NDL SPINAL BD -A

## 2018-01-01 PROCEDURE — 74011250637 HC RX REV CODE- 250/637: Performed by: HOSPITALIST

## 2018-01-01 PROCEDURE — 85027 COMPLETE CBC AUTOMATED: CPT | Performed by: EMERGENCY MEDICINE

## 2018-01-01 PROCEDURE — 72220 X-RAY EXAM SACRUM TAILBONE: CPT

## 2018-01-01 PROCEDURE — 72131 CT LUMBAR SPINE W/O DYE: CPT

## 2018-01-01 PROCEDURE — 87077 CULTURE AEROBIC IDENTIFY: CPT | Performed by: PHYSICAL MEDICINE & REHABILITATION

## 2018-01-01 PROCEDURE — 74011636320 HC RX REV CODE- 636/320: Performed by: RADIOLOGY

## 2018-01-01 PROCEDURE — 74011250636 HC RX REV CODE- 250/636: Performed by: EMERGENCY MEDICINE

## 2018-01-01 PROCEDURE — 87186 SC STD MICRODIL/AGAR DIL: CPT | Performed by: PHYSICAL MEDICINE & REHABILITATION

## 2018-01-01 PROCEDURE — G8979 MOBILITY GOAL STATUS: HCPCS

## 2018-01-01 PROCEDURE — 70450 CT HEAD/BRAIN W/O DYE: CPT

## 2018-01-01 PROCEDURE — 85025 COMPLETE CBC W/AUTO DIFF WBC: CPT | Performed by: EMERGENCY MEDICINE

## 2018-01-01 PROCEDURE — 73060 X-RAY EXAM OF HUMERUS: CPT

## 2018-01-01 PROCEDURE — 77030021782 HC SYS CEM CART DEL KYPH -C

## 2018-01-01 PROCEDURE — 77030011218 HC DEV BIOP BN KYPH -C

## 2018-01-01 PROCEDURE — 65660000000 HC RM CCU STEPDOWN

## 2018-01-01 PROCEDURE — 99284 EMERGENCY DEPT VISIT MOD MDM: CPT

## 2018-01-01 PROCEDURE — 80053 COMPREHEN METABOLIC PANEL: CPT | Performed by: EMERGENCY MEDICINE

## 2018-01-01 PROCEDURE — G8978 MOBILITY CURRENT STATUS: HCPCS

## 2018-01-01 PROCEDURE — 72128 CT CHEST SPINE W/O DYE: CPT

## 2018-01-01 PROCEDURE — 83735 ASSAY OF MAGNESIUM: CPT

## 2018-01-01 PROCEDURE — 0PS43ZZ REPOSITION THORACIC VERTEBRA, PERCUTANEOUS APPROACH: ICD-10-PCS | Performed by: RADIOLOGY

## 2018-01-01 PROCEDURE — 77030034843 HC TAMP SPN BN INFL EXP II KYPH -H

## 2018-01-01 PROCEDURE — 36415 COLL VENOUS BLD VENIPUNCTURE: CPT

## 2018-01-01 PROCEDURE — 73610 X-RAY EXAM OF ANKLE: CPT

## 2018-01-01 PROCEDURE — 85025 COMPLETE CBC W/AUTO DIFF WBC: CPT | Performed by: INTERNAL MEDICINE

## 2018-01-01 PROCEDURE — 36415 COLL VENOUS BLD VENIPUNCTURE: CPT | Performed by: HOSPITALIST

## 2018-01-01 RX ORDER — ONDANSETRON 4 MG/1
4 TABLET, ORALLY DISINTEGRATING ORAL
Status: DISCONTINUED | OUTPATIENT
Start: 2018-01-01 | End: 2018-01-01 | Stop reason: HOSPADM

## 2018-01-01 RX ORDER — FACIAL-BODY WIPES
10 EACH TOPICAL DAILY PRN
Status: DISCONTINUED | OUTPATIENT
Start: 2018-01-01 | End: 2018-01-01 | Stop reason: HOSPADM

## 2018-01-01 RX ORDER — LIDOCAINE 50 MG/G
1 PATCH TOPICAL EVERY 24 HOURS
Qty: 30 EACH | Refills: 5 | Status: SHIPPED | OUTPATIENT
Start: 2018-01-01 | End: 2018-01-01

## 2018-01-01 RX ORDER — TRAMADOL HYDROCHLORIDE 50 MG/1
25 TABLET ORAL EVERY 6 HOURS
Status: DISCONTINUED | OUTPATIENT
Start: 2018-01-01 | End: 2018-01-01

## 2018-01-01 RX ORDER — OXYCODONE HYDROCHLORIDE 5 MG/1
5 TABLET ORAL
Status: DISCONTINUED | OUTPATIENT
Start: 2018-01-01 | End: 2018-01-01 | Stop reason: HOSPADM

## 2018-01-01 RX ORDER — LIDOCAINE 50 MG/G
1 PATCH TOPICAL EVERY 24 HOURS
Status: DISCONTINUED | OUTPATIENT
Start: 2018-01-01 | End: 2018-01-01 | Stop reason: HOSPADM

## 2018-01-01 RX ORDER — GABAPENTIN 300 MG/1
600 CAPSULE ORAL DAILY
Status: DISCONTINUED | OUTPATIENT
Start: 2018-01-01 | End: 2018-01-01 | Stop reason: HOSPADM

## 2018-01-01 RX ORDER — ASPIRIN 81 MG/1
81 TABLET ORAL DAILY
Status: DISCONTINUED | OUTPATIENT
Start: 2018-01-01 | End: 2018-01-01 | Stop reason: HOSPADM

## 2018-01-01 RX ORDER — ESCITALOPRAM OXALATE 10 MG/1
10 TABLET ORAL EVERY EVENING
Status: DISCONTINUED | OUTPATIENT
Start: 2018-01-01 | End: 2018-01-01 | Stop reason: HOSPADM

## 2018-01-01 RX ORDER — LANOLIN ALCOHOL/MO/W.PET/CERES
1 CREAM (GRAM) TOPICAL 2 TIMES DAILY
COMMUNITY

## 2018-01-01 RX ORDER — OXYCODONE HYDROCHLORIDE 5 MG/1
5 TABLET ORAL
Status: COMPLETED | OUTPATIENT
Start: 2018-01-01 | End: 2018-01-01

## 2018-01-01 RX ORDER — TRAMADOL HYDROCHLORIDE 50 MG/1
50 TABLET ORAL
COMMUNITY
End: 2018-01-01

## 2018-01-01 RX ORDER — LEVOTHYROXINE SODIUM 75 UG/1
37.5 TABLET ORAL
Status: DISCONTINUED | OUTPATIENT
Start: 2018-01-01 | End: 2018-01-01 | Stop reason: HOSPADM

## 2018-01-01 RX ORDER — SODIUM CHLORIDE 0.9 % (FLUSH) 0.9 %
5-10 SYRINGE (ML) INJECTION EVERY 8 HOURS
Status: DISCONTINUED | OUTPATIENT
Start: 2018-01-01 | End: 2018-01-01 | Stop reason: HOSPADM

## 2018-01-01 RX ORDER — FENTANYL CITRATE 50 UG/ML
50 INJECTION, SOLUTION INTRAMUSCULAR; INTRAVENOUS
Status: COMPLETED | OUTPATIENT
Start: 2018-01-01 | End: 2018-01-01

## 2018-01-01 RX ORDER — SODIUM CHLORIDE 0.9 % (FLUSH) 0.9 %
5-10 SYRINGE (ML) INJECTION AS NEEDED
Status: DISCONTINUED | OUTPATIENT
Start: 2018-01-01 | End: 2018-01-01 | Stop reason: HOSPADM

## 2018-01-01 RX ORDER — GABAPENTIN 300 MG/1
600 CAPSULE ORAL DAILY
COMMUNITY
End: 2019-01-01 | Stop reason: SDUPTHER

## 2018-01-01 RX ORDER — LANOLIN ALCOHOL/MO/W.PET/CERES
3 CREAM (GRAM) TOPICAL
Status: DISCONTINUED | OUTPATIENT
Start: 2018-01-01 | End: 2018-01-01 | Stop reason: HOSPADM

## 2018-01-01 RX ORDER — ATORVASTATIN CALCIUM 10 MG/1
TABLET, FILM COATED ORAL
Qty: 90 TAB | Refills: 3 | Status: SHIPPED | OUTPATIENT
Start: 2018-01-01

## 2018-01-01 RX ORDER — LEVOTHYROXINE SODIUM 75 UG/1
75 TABLET ORAL
Status: DISCONTINUED | OUTPATIENT
Start: 2018-01-01 | End: 2018-01-01 | Stop reason: HOSPADM

## 2018-01-01 RX ORDER — LEVOTHYROXINE SODIUM 75 UG/1
75 TABLET ORAL
Status: DISCONTINUED | OUTPATIENT
Start: 2018-01-01 | End: 2018-01-01

## 2018-01-01 RX ORDER — NALOXONE HYDROCHLORIDE 0.4 MG/ML
0.4 INJECTION, SOLUTION INTRAMUSCULAR; INTRAVENOUS; SUBCUTANEOUS AS NEEDED
Status: DISCONTINUED | OUTPATIENT
Start: 2018-01-01 | End: 2018-01-01 | Stop reason: HOSPADM

## 2018-01-01 RX ORDER — LEVOTHYROXINE SODIUM 75 UG/1
TABLET ORAL
Qty: 78 TAB | Refills: 3 | Status: SHIPPED | OUTPATIENT
Start: 2018-01-01 | End: 2018-01-01 | Stop reason: SDUPTHER

## 2018-01-01 RX ORDER — ONDANSETRON 2 MG/ML
4 INJECTION INTRAMUSCULAR; INTRAVENOUS
Status: DISCONTINUED | OUTPATIENT
Start: 2018-01-01 | End: 2018-01-01 | Stop reason: HOSPADM

## 2018-01-01 RX ORDER — DIPHENHYDRAMINE HYDROCHLORIDE 50 MG/ML
25-50 INJECTION, SOLUTION INTRAMUSCULAR; INTRAVENOUS ONCE
Status: COMPLETED | OUTPATIENT
Start: 2018-01-01 | End: 2018-01-01

## 2018-01-01 RX ORDER — ESCITALOPRAM OXALATE 10 MG/1
TABLET ORAL
Qty: 90 TAB | Refills: 3 | Status: SHIPPED | OUTPATIENT
Start: 2018-01-01

## 2018-01-01 RX ORDER — OXYCODONE HYDROCHLORIDE 5 MG/1
5 TABLET ORAL
Qty: 30 TAB | Refills: 0 | Status: SHIPPED | OUTPATIENT
Start: 2018-01-01 | End: 2018-01-01

## 2018-01-01 RX ORDER — ENOXAPARIN SODIUM 100 MG/ML
40 INJECTION SUBCUTANEOUS DAILY
Status: DISCONTINUED | OUTPATIENT
Start: 2018-01-01 | End: 2018-01-01

## 2018-01-01 RX ORDER — ACETAMINOPHEN 325 MG/1
650 TABLET ORAL
Status: DISCONTINUED | OUTPATIENT
Start: 2018-01-01 | End: 2018-01-01 | Stop reason: HOSPADM

## 2018-01-01 RX ORDER — TRAMADOL HYDROCHLORIDE 50 MG/1
TABLET ORAL
Status: DISCONTINUED
Start: 2018-01-01 | End: 2018-01-01 | Stop reason: HOSPADM

## 2018-01-01 RX ORDER — DICLOFENAC SODIUM 10 MG/G
2 GEL TOPICAL 4 TIMES DAILY
Qty: 100 G | Refills: 0 | Status: SHIPPED | OUTPATIENT
Start: 2018-01-01

## 2018-01-01 RX ORDER — ACETAMINOPHEN 500 MG
1000 TABLET ORAL EVERY 6 HOURS
Status: DISCONTINUED | OUTPATIENT
Start: 2018-01-01 | End: 2018-01-01 | Stop reason: HOSPADM

## 2018-01-01 RX ORDER — KETOROLAC TROMETHAMINE 30 MG/ML
15 INJECTION, SOLUTION INTRAMUSCULAR; INTRAVENOUS AS NEEDED
Status: DISCONTINUED | OUTPATIENT
Start: 2018-01-01 | End: 2018-01-01

## 2018-01-01 RX ORDER — HYDROMORPHONE HYDROCHLORIDE 2 MG/ML
INJECTION, SOLUTION INTRAMUSCULAR; INTRAVENOUS; SUBCUTANEOUS
Status: COMPLETED
Start: 2018-01-01 | End: 2018-01-01

## 2018-01-01 RX ORDER — ATORVASTATIN CALCIUM 10 MG/1
10 TABLET, FILM COATED ORAL
Status: DISCONTINUED | OUTPATIENT
Start: 2018-01-01 | End: 2018-01-01 | Stop reason: HOSPADM

## 2018-01-01 RX ORDER — CALCITONIN SALMON 200 [IU]/.09ML
1 SPRAY, METERED NASAL DAILY
Status: DISCONTINUED | OUTPATIENT
Start: 2018-01-01 | End: 2018-01-01 | Stop reason: HOSPADM

## 2018-01-01 RX ORDER — HYDRALAZINE HYDROCHLORIDE 20 MG/ML
20 INJECTION INTRAMUSCULAR; INTRAVENOUS
Status: DISCONTINUED | OUTPATIENT
Start: 2018-01-01 | End: 2018-01-01

## 2018-01-01 RX ORDER — POLYETHYLENE GLYCOL 3350 17 G/17G
17 POWDER, FOR SOLUTION ORAL DAILY
Status: DISCONTINUED | OUTPATIENT
Start: 2018-01-01 | End: 2018-01-01 | Stop reason: HOSPADM

## 2018-01-01 RX ORDER — GUAIFENESIN 100 MG/5ML
81 LIQUID (ML) ORAL DAILY
Status: DISCONTINUED | OUTPATIENT
Start: 2018-01-01 | End: 2018-01-01 | Stop reason: HOSPADM

## 2018-01-01 RX ORDER — MORPHINE SULFATE 2 MG/ML
1 INJECTION, SOLUTION INTRAMUSCULAR; INTRAVENOUS
Status: DISCONTINUED | OUTPATIENT
Start: 2018-01-01 | End: 2018-01-01 | Stop reason: HOSPADM

## 2018-01-01 RX ORDER — ALENDRONATE SODIUM 70 MG/1
70 TABLET ORAL
COMMUNITY
Start: 2018-01-01

## 2018-01-01 RX ORDER — GUAIFENESIN/DEXTROMETHORPHAN 100-10MG/5
5 SYRUP ORAL
Status: DISCONTINUED | OUTPATIENT
Start: 2018-01-01 | End: 2018-01-01 | Stop reason: HOSPADM

## 2018-01-01 RX ORDER — AA/PROT/LYSINE/METHIO/VIT C/B6 50-12.5 MG
1 TABLET ORAL EVERY EVENING
COMMUNITY

## 2018-01-01 RX ORDER — FERROUS SULFATE, DRIED 160(50) MG
TABLET, EXTENDED RELEASE ORAL DAILY
Status: DISCONTINUED | OUTPATIENT
Start: 2018-01-01 | End: 2018-01-01 | Stop reason: HOSPADM

## 2018-01-01 RX ORDER — HYDROMORPHONE HYDROCHLORIDE 2 MG/ML
1 INJECTION, SOLUTION INTRAMUSCULAR; INTRAVENOUS; SUBCUTANEOUS ONCE
Status: COMPLETED | OUTPATIENT
Start: 2018-01-01 | End: 2018-01-01

## 2018-01-01 RX ORDER — ACETAMINOPHEN 500 MG
1000 TABLET ORAL
Status: COMPLETED | OUTPATIENT
Start: 2018-01-01 | End: 2018-01-01

## 2018-01-01 RX ORDER — MIDAZOLAM HYDROCHLORIDE 1 MG/ML
5 INJECTION, SOLUTION INTRAMUSCULAR; INTRAVENOUS
Status: DISCONTINUED | OUTPATIENT
Start: 2018-01-01 | End: 2018-01-01

## 2018-01-01 RX ORDER — TRAMADOL HYDROCHLORIDE 50 MG/1
50 TABLET ORAL
Status: COMPLETED | OUTPATIENT
Start: 2018-01-01 | End: 2018-01-01

## 2018-01-01 RX ORDER — ACETAMINOPHEN 325 MG/1
650 TABLET ORAL EVERY 6 HOURS
Status: DISCONTINUED | OUTPATIENT
Start: 2018-01-01 | End: 2018-01-01 | Stop reason: HOSPADM

## 2018-01-01 RX ORDER — ENOXAPARIN SODIUM 100 MG/ML
40 INJECTION SUBCUTANEOUS EVERY 24 HOURS
Status: DISCONTINUED | OUTPATIENT
Start: 2018-01-01 | End: 2018-01-01 | Stop reason: HOSPADM

## 2018-01-01 RX ORDER — LEVOTHYROXINE SODIUM 75 UG/1
TABLET ORAL
Qty: 85 TAB | Refills: 3 | Status: SHIPPED | OUTPATIENT
Start: 2018-01-01

## 2018-01-01 RX ORDER — LIDOCAINE 50 MG/G
1 PATCH TOPICAL EVERY 24 HOURS
Status: DISCONTINUED | OUTPATIENT
Start: 2018-01-01 | End: 2018-01-01 | Stop reason: SDUPTHER

## 2018-01-01 RX ORDER — GABAPENTIN 300 MG/1
CAPSULE ORAL
Status: ON HOLD | COMMUNITY
Start: 2018-01-01 | End: 2018-01-01

## 2018-01-01 RX ORDER — DOCUSATE SODIUM 100 MG/1
100 CAPSULE, LIQUID FILLED ORAL 2 TIMES DAILY
Status: DISCONTINUED | OUTPATIENT
Start: 2018-01-01 | End: 2018-01-01 | Stop reason: HOSPADM

## 2018-01-01 RX ORDER — SODIUM CHLORIDE 0.9 % (FLUSH) 0.9 %
5-10 SYRINGE (ML) INJECTION AS NEEDED
Status: DISCONTINUED | OUTPATIENT
Start: 2018-01-01 | End: 2018-01-01

## 2018-01-01 RX ORDER — FENTANYL CITRATE 50 UG/ML
100 INJECTION, SOLUTION INTRAMUSCULAR; INTRAVENOUS
Status: DISCONTINUED | OUTPATIENT
Start: 2018-01-01 | End: 2018-01-01

## 2018-01-01 RX ORDER — ESCITALOPRAM OXALATE 10 MG/1
10 TABLET ORAL DAILY
Status: DISCONTINUED | OUTPATIENT
Start: 2018-01-01 | End: 2018-01-01 | Stop reason: HOSPADM

## 2018-01-01 RX ORDER — FERROUS SULFATE, DRIED 160(50) MG
1 TABLET, EXTENDED RELEASE ORAL 2 TIMES DAILY WITH MEALS
Status: DISCONTINUED | OUTPATIENT
Start: 2018-01-01 | End: 2018-01-01 | Stop reason: HOSPADM

## 2018-01-01 RX ORDER — ADHESIVE BANDAGE
30 BANDAGE TOPICAL DAILY PRN
Status: DISCONTINUED | OUTPATIENT
Start: 2018-01-01 | End: 2018-01-01 | Stop reason: HOSPADM

## 2018-01-01 RX ORDER — OXYCODONE AND ACETAMINOPHEN 5; 325 MG/1; MG/1
1-2 TABLET ORAL
Status: DISCONTINUED | OUTPATIENT
Start: 2018-01-01 | End: 2018-01-01 | Stop reason: HOSPADM

## 2018-01-01 RX ORDER — GABAPENTIN 300 MG/1
300 CAPSULE ORAL EVERY EVENING
Status: ON HOLD | COMMUNITY
End: 2018-01-01 | Stop reason: ALTCHOICE

## 2018-01-01 RX ORDER — MULTIVIT WITH MINERALS/HERBS
1 TABLET ORAL DAILY
COMMUNITY

## 2018-01-01 RX ORDER — LEVOTHYROXINE SODIUM 75 UG/1
75 TABLET ORAL
Status: DISCONTINUED | OUTPATIENT
Start: 2018-01-01 | End: 2018-01-01 | Stop reason: CLARIF

## 2018-01-01 RX ORDER — OXYCODONE HYDROCHLORIDE 5 MG/1
5 TABLET ORAL
Qty: 30 TAB | Refills: 0 | Status: SHIPPED | OUTPATIENT
Start: 2018-01-01

## 2018-01-01 RX ORDER — TRAMADOL HYDROCHLORIDE 50 MG/1
50 TABLET ORAL 3 TIMES DAILY
Status: DISCONTINUED | OUTPATIENT
Start: 2018-01-01 | End: 2018-01-01

## 2018-01-01 RX ORDER — GABAPENTIN 300 MG/1
300 CAPSULE ORAL EVERY EVENING
Status: DISCONTINUED | OUTPATIENT
Start: 2018-01-01 | End: 2018-01-01 | Stop reason: HOSPADM

## 2018-01-01 RX ORDER — ZOLPIDEM TARTRATE 5 MG/1
5 TABLET ORAL
Status: DISCONTINUED | OUTPATIENT
Start: 2018-01-01 | End: 2018-01-01 | Stop reason: ALTCHOICE

## 2018-01-01 RX ORDER — HYDROCODONE BITARTRATE AND ACETAMINOPHEN 5; 325 MG/1; MG/1
1 TABLET ORAL ONCE
Status: COMPLETED | OUTPATIENT
Start: 2018-01-01 | End: 2018-01-01

## 2018-01-01 RX ORDER — LIDOCAINE 50 MG/G
1 PATCH TOPICAL ONCE
Status: COMPLETED | OUTPATIENT
Start: 2018-01-01 | End: 2018-01-01

## 2018-01-01 RX ORDER — CALCITONIN SALMON 200 [IU]/.09ML
1 SPRAY, METERED NASAL DAILY
COMMUNITY
End: 2018-01-01

## 2018-01-01 RX ORDER — CEFAZOLIN SODIUM/WATER 2 G/20 ML
2 SYRINGE (ML) INTRAVENOUS ONCE
Status: COMPLETED | OUTPATIENT
Start: 2018-01-01 | End: 2018-01-01

## 2018-01-01 RX ORDER — LIDOCAINE HYDROCHLORIDE 20 MG/ML
20 INJECTION, SOLUTION INFILTRATION; PERINEURAL
Status: COMPLETED | OUTPATIENT
Start: 2018-01-01 | End: 2018-01-01

## 2018-01-01 RX ORDER — TRAMADOL HYDROCHLORIDE 50 MG/1
50 TABLET ORAL
Qty: 10 TAB | Refills: 0 | Status: SHIPPED | OUTPATIENT
Start: 2018-01-01 | End: 2018-01-01

## 2018-01-01 RX ORDER — SODIUM CHLORIDE 0.9 % (FLUSH) 0.9 %
5-10 SYRINGE (ML) INJECTION EVERY 8 HOURS
Status: DISCONTINUED | OUTPATIENT
Start: 2018-01-01 | End: 2018-01-01

## 2018-01-01 RX ORDER — OXYCODONE AND ACETAMINOPHEN 5; 325 MG/1; MG/1
1 TABLET ORAL
Status: DISCONTINUED | OUTPATIENT
Start: 2018-01-01 | End: 2018-01-01

## 2018-01-01 RX ORDER — LIDOCAINE 50 MG/G
PATCH TOPICAL EVERY 24 HOURS
COMMUNITY
End: 2018-01-01 | Stop reason: SDUPTHER

## 2018-01-01 RX ORDER — NITROGLYCERIN 0.4 MG/1
0.4 TABLET SUBLINGUAL
Status: DISCONTINUED | OUTPATIENT
Start: 2018-01-01 | End: 2018-01-01 | Stop reason: HOSPADM

## 2018-01-01 RX ORDER — AMOXICILLIN 250 MG
1 CAPSULE ORAL DAILY
Qty: 30 TAB | Refills: 0 | Status: SHIPPED | OUTPATIENT
Start: 2018-01-01

## 2018-01-01 RX ORDER — LANOLIN ALCOHOL/MO/W.PET/CERES
CREAM (GRAM) TOPICAL
COMMUNITY

## 2018-01-01 RX ORDER — BUPIVACAINE HYDROCHLORIDE 5 MG/ML
2 INJECTION, SOLUTION EPIDURAL; INTRACAUDAL ONCE
Status: COMPLETED | OUTPATIENT
Start: 2018-01-01 | End: 2018-01-01

## 2018-01-01 RX ORDER — SODIUM CHLORIDE 9 MG/ML
25 INJECTION, SOLUTION INTRAVENOUS CONTINUOUS
Status: DISCONTINUED | OUTPATIENT
Start: 2018-01-01 | End: 2018-01-01

## 2018-01-01 RX ADMIN — TRAMADOL HYDROCHLORIDE 25 MG: 50 TABLET, FILM COATED ORAL at 23:03

## 2018-01-01 RX ADMIN — ATORVASTATIN CALCIUM 10 MG: 10 TABLET, FILM COATED ORAL at 22:47

## 2018-01-01 RX ADMIN — ASPIRIN 81 MG: 81 TABLET, COATED ORAL at 09:49

## 2018-01-01 RX ADMIN — OXYCODONE HYDROCHLORIDE 5 MG: 5 TABLET ORAL at 19:42

## 2018-01-01 RX ADMIN — Medication 10 ML: at 23:03

## 2018-01-01 RX ADMIN — DOCUSATE SODIUM 100 MG: 100 CAPSULE, LIQUID FILLED ORAL at 18:09

## 2018-01-01 RX ADMIN — OXYCODONE HYDROCHLORIDE 5 MG: 5 TABLET ORAL at 14:10

## 2018-01-01 RX ADMIN — ACETAMINOPHEN 1000 MG: 500 TABLET, FILM COATED ORAL at 17:25

## 2018-01-01 RX ADMIN — OXYCODONE HYDROCHLORIDE 5 MG: 5 TABLET ORAL at 22:48

## 2018-01-01 RX ADMIN — BUPIVACAINE HYDROCHLORIDE 20 MG: 5 INJECTION, SOLUTION EPIDURAL; INTRACAUDAL; PERINEURAL at 16:07

## 2018-01-01 RX ADMIN — ASPIRIN 81 MG 81 MG: 81 TABLET ORAL at 08:54

## 2018-01-01 RX ADMIN — CALCITONIN SALMON 1 SPRAY: 2200 SPRAY, METERED NASAL at 10:01

## 2018-01-01 RX ADMIN — ESCITALOPRAM OXALATE 10 MG: 10 TABLET ORAL at 08:54

## 2018-01-01 RX ADMIN — LIDOCAINE HYDROCHLORIDE 20 ML: 20 INJECTION, SOLUTION INFILTRATION; PERINEURAL at 16:07

## 2018-01-01 RX ADMIN — Medication 5 ML: at 14:00

## 2018-01-01 RX ADMIN — GABAPENTIN 600 MG: 300 CAPSULE ORAL at 09:43

## 2018-01-01 RX ADMIN — OXYCODONE HYDROCHLORIDE 5 MG: 5 TABLET ORAL at 17:25

## 2018-01-01 RX ADMIN — POLYETHYLENE GLYCOL 3350 17 G: 17 POWDER, FOR SOLUTION ORAL at 09:02

## 2018-01-01 RX ADMIN — Medication 10 ML: at 06:46

## 2018-01-01 RX ADMIN — CALCIUM CARBONATE 500 MG (1,250 MG)-VITAMIN D3 200 UNIT TABLET 1 TABLET: at 16:25

## 2018-01-01 RX ADMIN — ENOXAPARIN SODIUM 40 MG: 40 INJECTION SUBCUTANEOUS at 08:54

## 2018-01-01 RX ADMIN — ENOXAPARIN SODIUM 40 MG: 40 INJECTION SUBCUTANEOUS at 09:13

## 2018-01-01 RX ADMIN — ACETAMINOPHEN 650 MG: 325 TABLET ORAL at 18:01

## 2018-01-01 RX ADMIN — CALCIUM CARBONATE 500 MG (1,250 MG)-VITAMIN D3 200 UNIT TABLET 1 TABLET: at 09:39

## 2018-01-01 RX ADMIN — CALCITONIN SALMON 1 SPRAY: 2200 SPRAY, METERED NASAL at 13:29

## 2018-01-01 RX ADMIN — ESCITALOPRAM OXALATE 10 MG: 10 TABLET ORAL at 09:12

## 2018-01-01 RX ADMIN — OXYCODONE HYDROCHLORIDE 5 MG: 5 TABLET ORAL at 17:17

## 2018-01-01 RX ADMIN — POLYETHYLENE GLYCOL 3350 17 G: 17 POWDER, FOR SOLUTION ORAL at 08:38

## 2018-01-01 RX ADMIN — ESCITALOPRAM OXALATE 10 MG: 10 TABLET ORAL at 09:02

## 2018-01-01 RX ADMIN — Medication 10 ML: at 06:20

## 2018-01-01 RX ADMIN — ACETAMINOPHEN 650 MG: 325 TABLET ORAL at 04:16

## 2018-01-01 RX ADMIN — TRAMADOL HYDROCHLORIDE 50 MG: 50 TABLET, FILM COATED ORAL at 13:59

## 2018-01-01 RX ADMIN — ESCITALOPRAM OXALATE 10 MG: 10 TABLET ORAL at 08:38

## 2018-01-01 RX ADMIN — LEVOTHYROXINE SODIUM 75 MCG: 75 TABLET ORAL at 05:17

## 2018-01-01 RX ADMIN — ACETAMINOPHEN 650 MG: 325 TABLET ORAL at 06:52

## 2018-01-01 RX ADMIN — LEVOTHYROXINE SODIUM 75 MCG: 75 TABLET ORAL at 06:46

## 2018-01-01 RX ADMIN — MELATONIN 3 MG ORAL TABLET 3 MG: 3 TABLET ORAL at 23:01

## 2018-01-01 RX ADMIN — TRAMADOL HYDROCHLORIDE 50 MG: 50 TABLET, FILM COATED ORAL at 05:57

## 2018-01-01 RX ADMIN — LEVOTHYROXINE SODIUM 75 MCG: 75 TABLET ORAL at 05:57

## 2018-01-01 RX ADMIN — OXYCODONE HYDROCHLORIDE 5 MG: 5 TABLET ORAL at 17:23

## 2018-01-01 RX ADMIN — TRAMADOL HYDROCHLORIDE 50 MG: 50 TABLET, FILM COATED ORAL at 05:25

## 2018-01-01 RX ADMIN — TRAMADOL HYDROCHLORIDE 25 MG: 50 TABLET, FILM COATED ORAL at 11:48

## 2018-01-01 RX ADMIN — ATORVASTATIN CALCIUM 10 MG: 10 TABLET, FILM COATED ORAL at 21:11

## 2018-01-01 RX ADMIN — ASPIRIN 81 MG: 81 TABLET ORAL at 09:21

## 2018-01-01 RX ADMIN — OXYCODONE HYDROCHLORIDE AND ACETAMINOPHEN 1 TABLET: 5; 325 TABLET ORAL at 09:03

## 2018-01-01 RX ADMIN — ESCITALOPRAM OXALATE 10 MG: 10 TABLET ORAL at 09:01

## 2018-01-01 RX ADMIN — MELATONIN 3 MG ORAL TABLET 3 MG: 3 TABLET ORAL at 21:00

## 2018-01-01 RX ADMIN — TRAMADOL HYDROCHLORIDE 50 MG: 50 TABLET, FILM COATED ORAL at 12:39

## 2018-01-01 RX ADMIN — TRAMADOL HYDROCHLORIDE 50 MG: 50 TABLET, FILM COATED ORAL at 12:12

## 2018-01-01 RX ADMIN — DOCUSATE SODIUM 100 MG: 100 CAPSULE, LIQUID FILLED ORAL at 09:42

## 2018-01-01 RX ADMIN — ATORVASTATIN CALCIUM 10 MG: 10 TABLET, FILM COATED ORAL at 22:00

## 2018-01-01 RX ADMIN — ASPIRIN 81 MG 81 MG: 81 TABLET ORAL at 08:24

## 2018-01-01 RX ADMIN — CALCIUM CARBONATE 500 MG (1,250 MG)-VITAMIN D3 200 UNIT TABLET 1 TABLET: at 09:49

## 2018-01-01 RX ADMIN — TRAMADOL HYDROCHLORIDE 50 MG: 50 TABLET, FILM COATED ORAL at 13:07

## 2018-01-01 RX ADMIN — Medication 10 ML: at 00:25

## 2018-01-01 RX ADMIN — TRAMADOL HYDROCHLORIDE 50 MG: 50 TABLET, FILM COATED ORAL at 01:40

## 2018-01-01 RX ADMIN — CALCITONIN SALMON 1 SPRAY: 2200 SPRAY, METERED NASAL at 09:37

## 2018-01-01 RX ADMIN — ATORVASTATIN CALCIUM 10 MG: 10 TABLET, FILM COATED ORAL at 01:30

## 2018-01-01 RX ADMIN — ASPIRIN 81 MG 81 MG: 81 TABLET ORAL at 09:01

## 2018-01-01 RX ADMIN — POLYETHYLENE GLYCOL 3350 17 G: 17 POWDER, FOR SOLUTION ORAL at 09:00

## 2018-01-01 RX ADMIN — CALCIUM CARBONATE 500 MG (1,250 MG)-VITAMIN D3 200 UNIT TABLET 1 TABLET: at 09:42

## 2018-01-01 RX ADMIN — LEVOTHYROXINE SODIUM 75 MCG: 75 TABLET ORAL at 08:26

## 2018-01-01 RX ADMIN — CALCIUM CARBONATE 500 MG (1,250 MG)-VITAMIN D3 200 UNIT TABLET 1 TABLET: at 16:56

## 2018-01-01 RX ADMIN — Medication 10 ML: at 07:20

## 2018-01-01 RX ADMIN — TRAMADOL HYDROCHLORIDE 50 MG: 50 TABLET, FILM COATED ORAL at 21:15

## 2018-01-01 RX ADMIN — LEVOTHYROXINE SODIUM 75 MCG: 75 TABLET ORAL at 06:06

## 2018-01-01 RX ADMIN — TRAMADOL HYDROCHLORIDE 50 MG: 50 TABLET, FILM COATED ORAL at 13:35

## 2018-01-01 RX ADMIN — CALCIUM CARBONATE 500 MG (1,250 MG)-VITAMIN D3 200 UNIT TABLET 1 TABLET: at 09:13

## 2018-01-01 RX ADMIN — ATORVASTATIN CALCIUM 10 MG: 10 TABLET, FILM COATED ORAL at 21:15

## 2018-01-01 RX ADMIN — ENOXAPARIN SODIUM 40 MG: 40 INJECTION SUBCUTANEOUS at 10:01

## 2018-01-01 RX ADMIN — ATORVASTATIN CALCIUM 10 MG: 10 TABLET, FILM COATED ORAL at 21:50

## 2018-01-01 RX ADMIN — POLYETHYLENE GLYCOL 3350 17 G: 17 POWDER, FOR SOLUTION ORAL at 09:36

## 2018-01-01 RX ADMIN — ATORVASTATIN CALCIUM 10 MG: 10 TABLET, FILM COATED ORAL at 23:06

## 2018-01-01 RX ADMIN — CALCIUM CARBONATE 500 MG (1,250 MG)-VITAMIN D3 200 UNIT TABLET 1 TABLET: at 10:01

## 2018-01-01 RX ADMIN — OXYCODONE HYDROCHLORIDE AND ACETAMINOPHEN 1 TABLET: 5; 325 TABLET ORAL at 18:16

## 2018-01-01 RX ADMIN — MELATONIN 3 MG ORAL TABLET 3 MG: 3 TABLET ORAL at 21:15

## 2018-01-01 RX ADMIN — ATORVASTATIN CALCIUM 10 MG: 10 TABLET, FILM COATED ORAL at 21:07

## 2018-01-01 RX ADMIN — Medication 10 ML: at 06:43

## 2018-01-01 RX ADMIN — ESCITALOPRAM OXALATE 10 MG: 10 TABLET ORAL at 08:59

## 2018-01-01 RX ADMIN — GABAPENTIN 600 MG: 300 CAPSULE ORAL at 08:26

## 2018-01-01 RX ADMIN — GUAIFENESIN AND DEXTROMETHORPHAN 5 ML: 100; 10 SYRUP ORAL at 15:02

## 2018-01-01 RX ADMIN — CALCIUM CARBONATE 500 MG (1,250 MG)-VITAMIN D3 200 UNIT TABLET 1 TABLET: at 18:16

## 2018-01-01 RX ADMIN — POLYETHYLENE GLYCOL 3350 17 G: 17 POWDER, FOR SOLUTION ORAL at 10:01

## 2018-01-01 RX ADMIN — OXYCODONE HYDROCHLORIDE 5 MG: 5 TABLET ORAL at 10:02

## 2018-01-01 RX ADMIN — TRAMADOL HYDROCHLORIDE 50 MG: 50 TABLET, FILM COATED ORAL at 21:07

## 2018-01-01 RX ADMIN — Medication 10 ML: at 23:55

## 2018-01-01 RX ADMIN — MELATONIN 3 MG ORAL TABLET 3 MG: 3 TABLET ORAL at 21:07

## 2018-01-01 RX ADMIN — ASPIRIN 81 MG 81 MG: 81 TABLET ORAL at 08:17

## 2018-01-01 RX ADMIN — OXYCODONE HYDROCHLORIDE 5 MG: 5 TABLET ORAL at 08:54

## 2018-01-01 RX ADMIN — ACETAMINOPHEN 1000 MG: 500 TABLET ORAL at 22:38

## 2018-01-01 RX ADMIN — ASPIRIN 81 MG 81 MG: 81 TABLET ORAL at 09:37

## 2018-01-01 RX ADMIN — CALCIUM CARBONATE 500 MG (1,250 MG)-VITAMIN D3 200 UNIT TABLET 1 TABLET: at 08:24

## 2018-01-01 RX ADMIN — MAGNESIUM HYDROXIDE 30 ML: 400 SUSPENSION ORAL at 06:05

## 2018-01-01 RX ADMIN — CALCIUM CARBONATE 500 MG (1,250 MG)-VITAMIN D3 200 UNIT TABLET 1 TABLET: at 20:12

## 2018-01-01 RX ADMIN — CALCIUM CARBONATE 500 MG (1,250 MG)-VITAMIN D3 200 UNIT TABLET 1 TABLET: at 09:37

## 2018-01-01 RX ADMIN — CALCITONIN SALMON 1 SPRAY: 2200 SPRAY, METERED NASAL at 09:13

## 2018-01-01 RX ADMIN — OXYCODONE HYDROCHLORIDE 5 MG: 5 TABLET ORAL at 16:25

## 2018-01-01 RX ADMIN — OXYCODONE HYDROCHLORIDE AND ACETAMINOPHEN 1 TABLET: 5; 325 TABLET ORAL at 01:17

## 2018-01-01 RX ADMIN — Medication 10 ML: at 22:33

## 2018-01-01 RX ADMIN — OXYCODONE HYDROCHLORIDE 5 MG: 5 TABLET ORAL at 01:01

## 2018-01-01 RX ADMIN — ATORVASTATIN CALCIUM 10 MG: 10 TABLET, FILM COATED ORAL at 22:32

## 2018-01-01 RX ADMIN — OXYCODONE HYDROCHLORIDE 5 MG: 5 TABLET ORAL at 17:56

## 2018-01-01 RX ADMIN — HYDROMORPHONE HYDROCHLORIDE 1 MG: 2 INJECTION INTRAMUSCULAR; INTRAVENOUS; SUBCUTANEOUS at 14:55

## 2018-01-01 RX ADMIN — OXYCODONE HYDROCHLORIDE 5 MG: 5 TABLET ORAL at 11:48

## 2018-01-01 RX ADMIN — Medication 10 ML: at 15:02

## 2018-01-01 RX ADMIN — Medication 10 ML: at 15:15

## 2018-01-01 RX ADMIN — TRAMADOL HYDROCHLORIDE 25 MG: 50 TABLET, FILM COATED ORAL at 06:06

## 2018-01-01 RX ADMIN — TRAMADOL HYDROCHLORIDE 50 MG: 50 TABLET, FILM COATED ORAL at 15:40

## 2018-01-01 RX ADMIN — ENOXAPARIN SODIUM 40 MG: 40 INJECTION SUBCUTANEOUS at 09:49

## 2018-01-01 RX ADMIN — OXYCODONE HYDROCHLORIDE AND ACETAMINOPHEN 1 TABLET: 5; 325 TABLET ORAL at 15:12

## 2018-01-01 RX ADMIN — CALCIUM CARBONATE 500 MG (1,250 MG)-VITAMIN D3 200 UNIT TABLET 1 TABLET: at 08:26

## 2018-01-01 RX ADMIN — CALCIUM CARBONATE 500 MG (1,250 MG)-VITAMIN D3 200 UNIT TABLET 1 TABLET: at 08:54

## 2018-01-01 RX ADMIN — ESCITALOPRAM OXALATE 10 MG: 10 TABLET ORAL at 08:24

## 2018-01-01 RX ADMIN — ACETAMINOPHEN 650 MG: 325 TABLET ORAL at 06:51

## 2018-01-01 RX ADMIN — DOCUSATE SODIUM 100 MG: 100 CAPSULE, LIQUID FILLED ORAL at 09:22

## 2018-01-01 RX ADMIN — POLYETHYLENE GLYCOL 3350 17 G: 17 POWDER, FOR SOLUTION ORAL at 09:42

## 2018-01-01 RX ADMIN — ATORVASTATIN CALCIUM 10 MG: 10 TABLET, FILM COATED ORAL at 21:24

## 2018-01-01 RX ADMIN — IOPAMIDOL 10 ML: 408 INJECTION, SOLUTION INTRATHECAL at 16:07

## 2018-01-01 RX ADMIN — Medication 2 G: at 14:42

## 2018-01-01 RX ADMIN — ACETAMINOPHEN 1000 MG: 500 TABLET, FILM COATED ORAL at 12:25

## 2018-01-01 RX ADMIN — ATORVASTATIN CALCIUM 10 MG: 10 TABLET, FILM COATED ORAL at 23:02

## 2018-01-01 RX ADMIN — ATORVASTATIN CALCIUM 10 MG: 10 TABLET, FILM COATED ORAL at 22:35

## 2018-01-01 RX ADMIN — Medication 10 ML: at 06:52

## 2018-01-01 RX ADMIN — FENTANYL CITRATE 25 MCG: 50 INJECTION, SOLUTION INTRAMUSCULAR; INTRAVENOUS at 15:32

## 2018-01-01 RX ADMIN — MELATONIN 3 MG ORAL TABLET 3 MG: 3 TABLET ORAL at 22:47

## 2018-01-01 RX ADMIN — CALCITONIN SALMON 1 SPRAY: 2200 SPRAY, METERED NASAL at 08:24

## 2018-01-01 RX ADMIN — DOCUSATE SODIUM 100 MG: 100 CAPSULE, LIQUID FILLED ORAL at 08:27

## 2018-01-01 RX ADMIN — ASPIRIN 81 MG: 81 TABLET ORAL at 09:42

## 2018-01-01 RX ADMIN — CALCIUM CARBONATE 500 MG (1,250 MG)-VITAMIN D3 200 UNIT TABLET 1 TABLET: at 09:01

## 2018-01-01 RX ADMIN — OXYCODONE HYDROCHLORIDE 5 MG: 5 TABLET ORAL at 22:22

## 2018-01-01 RX ADMIN — SODIUM CHLORIDE 25 ML/HR: 900 INJECTION, SOLUTION INTRAVENOUS at 14:33

## 2018-01-01 RX ADMIN — CALCIUM CARBONATE 500 MG (1,250 MG)-VITAMIN D3 200 UNIT TABLET 1 TABLET: at 16:36

## 2018-01-01 RX ADMIN — CALCIUM CARBONATE 500 MG (1,250 MG)-VITAMIN D3 200 UNIT TABLET 1 TABLET: at 08:59

## 2018-01-01 RX ADMIN — TRAMADOL HYDROCHLORIDE 50 MG: 50 TABLET, FILM COATED ORAL at 06:06

## 2018-01-01 RX ADMIN — ACETAMINOPHEN 1000 MG: 500 TABLET, FILM COATED ORAL at 22:47

## 2018-01-01 RX ADMIN — OXYCODONE HYDROCHLORIDE 5 MG: 5 TABLET ORAL at 08:17

## 2018-01-01 RX ADMIN — CALCITONIN SALMON 1 SPRAY: 2200 SPRAY, METERED NASAL at 08:54

## 2018-01-01 RX ADMIN — CALCIUM CARBONATE 500 MG (1,250 MG)-VITAMIN D3 200 UNIT TABLET 1 TABLET: at 08:38

## 2018-01-01 RX ADMIN — OXYCODONE HYDROCHLORIDE AND ACETAMINOPHEN 2 TABLET: 5; 325 TABLET ORAL at 12:29

## 2018-01-01 RX ADMIN — ACETAMINOPHEN 650 MG: 325 TABLET ORAL at 18:09

## 2018-01-01 RX ADMIN — OXYCODONE HYDROCHLORIDE 5 MG: 5 TABLET ORAL at 06:42

## 2018-01-01 RX ADMIN — POLYETHYLENE GLYCOL 3350 17 G: 17 POWDER, FOR SOLUTION ORAL at 08:27

## 2018-01-01 RX ADMIN — TRAMADOL HYDROCHLORIDE 25 MG: 50 TABLET, FILM COATED ORAL at 17:22

## 2018-01-01 RX ADMIN — ESCITALOPRAM OXALATE 10 MG: 10 TABLET ORAL at 09:37

## 2018-01-01 RX ADMIN — TRAMADOL HYDROCHLORIDE 25 MG: 50 TABLET, FILM COATED ORAL at 23:06

## 2018-01-01 RX ADMIN — HYDROMORPHONE HYDROCHLORIDE 1 MG: 2 INJECTION, SOLUTION INTRAMUSCULAR; INTRAVENOUS; SUBCUTANEOUS at 14:36

## 2018-01-01 RX ADMIN — TRAMADOL HYDROCHLORIDE 25 MG: 50 TABLET, FILM COATED ORAL at 17:17

## 2018-01-01 RX ADMIN — ACETAMINOPHEN 650 MG: 325 TABLET ORAL at 12:51

## 2018-01-01 RX ADMIN — ENOXAPARIN SODIUM 40 MG: 40 INJECTION SUBCUTANEOUS at 08:38

## 2018-01-01 RX ADMIN — CALCIUM CARBONATE 500 MG (1,250 MG)-VITAMIN D3 200 UNIT TABLET 1 TABLET: at 17:25

## 2018-01-01 RX ADMIN — OXYCODONE HYDROCHLORIDE 5 MG: 5 TABLET ORAL at 16:56

## 2018-01-01 RX ADMIN — CALCITONIN SALMON 1 SPRAY: 2200 SPRAY, METERED NASAL at 09:01

## 2018-01-01 RX ADMIN — CALCITONIN SALMON 1 SPRAY: 2200 SPRAY, METERED NASAL at 09:00

## 2018-01-01 RX ADMIN — POLYETHYLENE GLYCOL 3350 17 G: 17 POWDER, FOR SOLUTION ORAL at 08:24

## 2018-01-01 RX ADMIN — ACETAMINOPHEN 650 MG: 325 TABLET ORAL at 01:30

## 2018-01-01 RX ADMIN — ATORVASTATIN CALCIUM 10 MG: 10 TABLET, FILM COATED ORAL at 23:13

## 2018-01-01 RX ADMIN — TRAMADOL HYDROCHLORIDE 25 MG: 50 TABLET, FILM COATED ORAL at 05:41

## 2018-01-01 RX ADMIN — ASPIRIN 81 MG: 81 TABLET, COATED ORAL at 09:02

## 2018-01-01 RX ADMIN — TRAMADOL HYDROCHLORIDE 25 MG: 50 TABLET, FILM COATED ORAL at 22:35

## 2018-01-01 RX ADMIN — FENTANYL CITRATE 50 MCG: 50 INJECTION, SOLUTION INTRAMUSCULAR; INTRAVENOUS at 17:38

## 2018-01-01 RX ADMIN — ENOXAPARIN SODIUM 40 MG: 40 INJECTION SUBCUTANEOUS at 09:01

## 2018-01-01 RX ADMIN — TRAMADOL HYDROCHLORIDE 25 MG: 50 TABLET, FILM COATED ORAL at 14:50

## 2018-01-01 RX ADMIN — ESCITALOPRAM OXALATE 10 MG: 10 TABLET ORAL at 09:49

## 2018-01-01 RX ADMIN — LEVOTHYROXINE SODIUM 75 MCG: 75 TABLET ORAL at 05:25

## 2018-01-01 RX ADMIN — ENOXAPARIN SODIUM 40 MG: 40 INJECTION SUBCUTANEOUS at 09:00

## 2018-01-01 RX ADMIN — LEVOTHYROXINE SODIUM 75 MCG: 75 TABLET ORAL at 06:20

## 2018-01-01 RX ADMIN — ACETAMINOPHEN 650 MG: 325 TABLET ORAL at 23:13

## 2018-01-01 RX ADMIN — TRAMADOL HYDROCHLORIDE 25 MG: 50 TABLET, FILM COATED ORAL at 16:56

## 2018-01-01 RX ADMIN — POLYETHYLENE GLYCOL 3350 17 G: 17 POWDER, FOR SOLUTION ORAL at 09:50

## 2018-01-01 RX ADMIN — ENOXAPARIN SODIUM 40 MG: 40 INJECTION SUBCUTANEOUS at 09:37

## 2018-01-01 RX ADMIN — OXYCODONE HYDROCHLORIDE 5 MG: 5 TABLET ORAL at 12:25

## 2018-01-01 RX ADMIN — TRAMADOL HYDROCHLORIDE 50 MG: 50 TABLET, FILM COATED ORAL at 05:17

## 2018-01-01 RX ADMIN — MELATONIN 3 MG ORAL TABLET 3 MG: 3 TABLET ORAL at 23:06

## 2018-01-01 RX ADMIN — POLYETHYLENE GLYCOL 3350 17 G: 17 POWDER, FOR SOLUTION ORAL at 09:22

## 2018-01-01 RX ADMIN — TRAMADOL HYDROCHLORIDE 50 MG: 50 TABLET, FILM COATED ORAL at 21:00

## 2018-01-01 RX ADMIN — ASPIRIN 81 MG 81 MG: 81 TABLET ORAL at 10:01

## 2018-01-01 RX ADMIN — CALCIUM CARBONATE 500 MG (1,250 MG)-VITAMIN D3 200 UNIT TABLET 1 TABLET: at 16:07

## 2018-01-01 RX ADMIN — OXYCODONE HYDROCHLORIDE 5 MG: 5 TABLET ORAL at 22:38

## 2018-01-01 RX ADMIN — ACETAMINOPHEN 650 MG: 325 TABLET ORAL at 09:13

## 2018-01-01 RX ADMIN — OXYCODONE HYDROCHLORIDE 5 MG: 5 TABLET ORAL at 23:02

## 2018-01-01 RX ADMIN — ONDANSETRON HYDROCHLORIDE 4 MG: 2 INJECTION, SOLUTION INTRAMUSCULAR; INTRAVENOUS at 14:36

## 2018-01-01 RX ADMIN — OXYCODONE HYDROCHLORIDE 5 MG: 5 TABLET ORAL at 09:36

## 2018-01-01 RX ADMIN — CALCITONIN SALMON 1 SPRAY: 2200 SPRAY, METERED NASAL at 08:36

## 2018-01-01 RX ADMIN — ESCITALOPRAM OXALATE 10 MG: 10 TABLET ORAL at 18:01

## 2018-01-01 RX ADMIN — GABAPENTIN 300 MG: 300 CAPSULE ORAL at 18:09

## 2018-01-01 RX ADMIN — OXYCODONE HYDROCHLORIDE 5 MG: 5 TABLET ORAL at 08:39

## 2018-01-01 RX ADMIN — ENOXAPARIN SODIUM 40 MG: 40 INJECTION SUBCUTANEOUS at 09:39

## 2018-01-01 RX ADMIN — Medication 10 ML: at 21:11

## 2018-01-01 RX ADMIN — ASPIRIN 81 MG 81 MG: 81 TABLET ORAL at 08:38

## 2018-01-01 RX ADMIN — ESCITALOPRAM OXALATE 10 MG: 10 TABLET ORAL at 18:09

## 2018-01-01 RX ADMIN — Medication 10 ML: at 06:51

## 2018-01-01 RX ADMIN — ASPIRIN 81 MG 81 MG: 81 TABLET ORAL at 08:59

## 2018-01-01 RX ADMIN — TRAMADOL HYDROCHLORIDE 25 MG: 50 TABLET, FILM COATED ORAL at 06:46

## 2018-01-01 RX ADMIN — DIPHENHYDRAMINE HYDROCHLORIDE 25 MG: 50 INJECTION, SOLUTION INTRAMUSCULAR; INTRAVENOUS at 14:33

## 2018-01-01 RX ADMIN — POLYETHYLENE GLYCOL 3350 17 G: 17 POWDER, FOR SOLUTION ORAL at 09:38

## 2018-01-01 RX ADMIN — POLYETHYLENE GLYCOL 3350 17 G: 17 POWDER, FOR SOLUTION ORAL at 08:59

## 2018-01-01 RX ADMIN — MIDAZOLAM 0.5 MG: 1 INJECTION INTRAMUSCULAR; INTRAVENOUS at 15:05

## 2018-01-01 RX ADMIN — OXYCODONE HYDROCHLORIDE 5 MG: 5 TABLET ORAL at 21:27

## 2018-01-01 RX ADMIN — OXYCODONE HYDROCHLORIDE 5 MG: 5 TABLET ORAL at 08:57

## 2018-01-01 RX ADMIN — CALCIUM CARBONATE 500 MG (1,250 MG)-VITAMIN D3 200 UNIT TABLET 1 TABLET: at 09:21

## 2018-01-01 RX ADMIN — CALCIUM CARBONATE 500 MG (1,250 MG)-VITAMIN D3 200 UNIT TABLET 1 TABLET: at 17:33

## 2018-01-01 RX ADMIN — ESCITALOPRAM OXALATE 10 MG: 10 TABLET ORAL at 09:39

## 2018-01-01 RX ADMIN — CALCIUM CARBONATE 500 MG (1,250 MG)-VITAMIN D3 200 UNIT TABLET 1 TABLET: at 17:16

## 2018-01-01 RX ADMIN — ATORVASTATIN CALCIUM 10 MG: 10 TABLET, FILM COATED ORAL at 21:00

## 2018-01-01 RX ADMIN — ASPIRIN 81 MG: 81 TABLET ORAL at 08:26

## 2018-01-01 RX ADMIN — SODIUM CHLORIDE 500 ML: 900 INJECTION, SOLUTION INTRAVENOUS at 16:21

## 2018-01-01 RX ADMIN — CALCIUM CARBONATE 500 MG (1,250 MG)-VITAMIN D3 200 UNIT TABLET 1 TABLET: at 09:03

## 2018-01-01 RX ADMIN — ENOXAPARIN SODIUM 40 MG: 40 INJECTION SUBCUTANEOUS at 08:17

## 2018-01-01 RX ADMIN — OXYCODONE HYDROCHLORIDE 5 MG: 5 TABLET ORAL at 02:32

## 2018-01-01 RX ADMIN — HYDROCODONE BITARTRATE AND ACETAMINOPHEN 1 TABLET: 5; 325 TABLET ORAL at 10:14

## 2018-01-01 RX ADMIN — OXYCODONE HYDROCHLORIDE 5 MG: 5 TABLET ORAL at 07:12

## 2018-01-01 RX ADMIN — Medication 10 ML: at 18:02

## 2018-01-01 RX ADMIN — POLYETHYLENE GLYCOL 3350 17 G: 17 POWDER, FOR SOLUTION ORAL at 08:17

## 2018-01-01 RX ADMIN — OXYCODONE HYDROCHLORIDE 5 MG: 5 TABLET ORAL at 03:48

## 2018-01-01 RX ADMIN — CALCITONIN SALMON 1 SPRAY: 2200 SPRAY, METERED NASAL at 09:57

## 2018-01-01 RX ADMIN — ATORVASTATIN CALCIUM 10 MG: 10 TABLET, FILM COATED ORAL at 21:27

## 2018-01-01 RX ADMIN — ACETAMINOPHEN 1000 MG: 500 TABLET ORAL at 10:28

## 2018-01-01 RX ADMIN — ENOXAPARIN SODIUM 40 MG: 40 INJECTION SUBCUTANEOUS at 08:58

## 2018-01-01 RX ADMIN — ACETAMINOPHEN 650 MG: 325 TABLET ORAL at 23:02

## 2018-01-01 RX ADMIN — CALCIUM CARBONATE 500 MG (1,250 MG)-VITAMIN D3 200 UNIT TABLET 1 TABLET: at 17:56

## 2018-01-01 RX ADMIN — OXYCODONE HYDROCHLORIDE 5 MG: 5 TABLET ORAL at 06:08

## 2018-01-01 RX ADMIN — ESCITALOPRAM OXALATE 10 MG: 10 TABLET ORAL at 08:17

## 2018-01-01 RX ADMIN — ASPIRIN 81 MG 81 MG: 81 TABLET ORAL at 09:12

## 2018-01-01 RX ADMIN — POLYETHYLENE GLYCOL 3350 17 G: 17 POWDER, FOR SOLUTION ORAL at 09:12

## 2018-01-01 RX ADMIN — OXYCODONE HYDROCHLORIDE 5 MG: 5 TABLET ORAL at 09:13

## 2018-01-01 RX ADMIN — CALCIUM CARBONATE 500 MG (1,250 MG)-VITAMIN D3 200 UNIT TABLET 1 TABLET: at 17:19

## 2018-01-01 RX ADMIN — OXYCODONE HYDROCHLORIDE 5 MG: 5 TABLET ORAL at 02:13

## 2018-01-01 RX ADMIN — CALCIUM CARBONATE 500 MG (1,250 MG)-VITAMIN D3 200 UNIT TABLET 1 TABLET: at 08:17

## 2018-01-01 RX ADMIN — GABAPENTIN 300 MG: 300 CAPSULE ORAL at 18:01

## 2018-01-01 RX ADMIN — OXYCODONE HYDROCHLORIDE 5 MG: 5 TABLET ORAL at 11:08

## 2018-01-01 RX ADMIN — LEVOTHYROXINE SODIUM 75 MCG: 75 TABLET ORAL at 06:29

## 2018-01-01 RX ADMIN — ACETAMINOPHEN 650 MG: 325 TABLET ORAL at 06:43

## 2018-01-01 RX ADMIN — ENOXAPARIN SODIUM 40 MG: 40 INJECTION SUBCUTANEOUS at 08:24

## 2018-01-01 RX ADMIN — OXYCODONE HYDROCHLORIDE 5 MG: 5 TABLET ORAL at 14:04

## 2018-01-01 RX ADMIN — ACETAMINOPHEN 650 MG: 325 TABLET ORAL at 11:08

## 2018-01-01 RX ADMIN — GABAPENTIN 600 MG: 300 CAPSULE ORAL at 09:21

## 2018-01-01 RX ADMIN — TRAMADOL HYDROCHLORIDE 25 MG: 50 TABLET, FILM COATED ORAL at 11:26

## 2018-01-01 RX ADMIN — OXYCODONE HYDROCHLORIDE AND ACETAMINOPHEN 2 TABLET: 5; 325 TABLET ORAL at 06:46

## 2018-01-01 RX ADMIN — LEVOTHYROXINE SODIUM 75 MCG: 75 TABLET ORAL at 07:20

## 2018-01-01 RX ADMIN — LEVOTHYROXINE SODIUM 75 MCG: 75 TABLET ORAL at 06:51

## 2018-01-01 RX ADMIN — Medication 10 ML: at 23:14

## 2018-01-01 RX ADMIN — ESCITALOPRAM OXALATE 10 MG: 10 TABLET ORAL at 10:01

## 2018-01-01 RX ADMIN — LEVOTHYROXINE SODIUM 75 MCG: 75 TABLET ORAL at 05:41

## 2018-01-01 RX ADMIN — DOCUSATE SODIUM 100 MG: 100 CAPSULE, LIQUID FILLED ORAL at 18:01

## 2018-01-01 RX ADMIN — OXYCODONE HYDROCHLORIDE 5 MG: 5 TABLET ORAL at 07:00

## 2018-01-01 RX ADMIN — CALCITONIN SALMON 1 SPRAY: 2200 SPRAY, METERED NASAL at 08:16

## 2018-01-01 RX ADMIN — OXYCODONE HYDROCHLORIDE 5 MG: 5 TABLET ORAL at 12:00

## 2018-01-01 RX ADMIN — ASPIRIN 81 MG: 81 TABLET, COATED ORAL at 09:38

## 2018-01-01 RX ADMIN — LEVOTHYROXINE SODIUM 75 MCG: 75 TABLET ORAL at 06:43

## 2018-01-11 DIAGNOSIS — M81.0 OSTEOPOROSIS: ICD-10-CM

## 2018-01-11 RX ORDER — ALENDRONATE SODIUM 70 MG/1
TABLET ORAL
Qty: 12 TAB | Refills: 3 | Status: ON HOLD | OUTPATIENT
Start: 2018-01-11 | End: 2018-01-01

## 2018-02-07 ENCOUNTER — OFFICE VISIT (OUTPATIENT)
Dept: CARDIOLOGY CLINIC | Age: 83
End: 2018-02-07

## 2018-02-07 VITALS
DIASTOLIC BLOOD PRESSURE: 78 MMHG | HEIGHT: 65 IN | WEIGHT: 160 LBS | RESPIRATION RATE: 18 BRPM | SYSTOLIC BLOOD PRESSURE: 132 MMHG | BODY MASS INDEX: 26.66 KG/M2 | OXYGEN SATURATION: 94 %

## 2018-02-07 DIAGNOSIS — F03.90 DEMENTIA WITHOUT BEHAVIORAL DISTURBANCE, UNSPECIFIED DEMENTIA TYPE: ICD-10-CM

## 2018-02-07 DIAGNOSIS — I95.1 SYNCOPE DUE TO ORTHOSTATIC HYPOTENSION: Primary | ICD-10-CM

## 2018-02-07 DIAGNOSIS — R55 VASOVAGAL SYNDROME: ICD-10-CM

## 2018-02-07 DIAGNOSIS — F41.1 GENERALIZED ANXIETY DISORDER: ICD-10-CM

## 2018-02-07 DIAGNOSIS — Z95.0 CARDIAC PACEMAKER IN SITU: ICD-10-CM

## 2018-02-07 DIAGNOSIS — I10 ESSENTIAL HYPERTENSION: ICD-10-CM

## 2018-02-07 NOTE — MR AVS SNAPSHOT
[address] [place], [place] [id number] 
[phone] Patient: Bruno Vela MRN: [id number] :3/6/1932 Visit Information Date & Time Provider Department Dept. Phone Encounter #  
 2018  1:00 PM Lakesha Tian, [facility] Cardiology Associate Mckenzie Sosa [phone] [id number] Your Appointments 5/3/2018 10:45 AM  
ROUTINE CARE with Gretchen Bledsoe MD  
[facility] [address]) Appt Note: 6 month follow up; 6 month follow up  
 [address] [address]  
[address] [facility] [address] [facility] 2018  9:50 AM  
Follow Up with Sugey Hamlin MD  
[facility] [address]) Appt Note: 6 month f/u Thyroid One [facility] [address] [phone] [address] Upcoming Health Maintenance Date Due  
 GLAUCOMA SCREENING Q2Y 2016 MEDICARE YEARLY EXAM 2018 DTaP/Tdap/Td series (2 - Td) 10/22/2025 Allergies as of 2018  Review Complete On: 2018 By: Doris Garcia LPN Severity Noted Reaction Type Reactions Amoxicillin  2011    Unknown (comments) Aricept [Donepezil]  2012    Other (comments) Bad dreams. Augmentin [Amoxicillin-pot Clavulanate]  2011    Unknown (comments) Pcn [Penicillins]  2016    Rash Zithromax [Azithromycin]  2011    Unknown (comments) Current Immunizations  Reviewed on 10/22/2015 Name Date Influenza High Dose Vaccine PF 10/30/2017 11:45 AM  
 Influenza Vaccine 10/22/2014 10:31 AM, 10/21/2013 Influenza Vaccine Cari Pillow) 10/22/2015 Influenza Vaccine (Quad) PF 10/27/2016 Influenza Vaccine Split 10/9/2012, 10/3/2011 Influenza Vaccine Whole 10/1/2010 Pneumococcal Conjugate (PCV-13) 11/17/2015 Tdap 10/22/2015 ZZZ-RETIRED (DO NOT USE) Pneumococcal Vaccine (Unspecified Type) 10/1/2010 Not reviewed this visit You Were Diagnosed With   
  
 Codes Comments Essential hypertension    -  Primary ICD-10-CM: I10 
ICD-9-CM: 401.9 Vitals BP Resp Height(growth percentile) Weight(growth percentile) SpO2 BMI  
 132/78 (BP 1 Location: Left arm, BP Patient Position: Sitting) 18 5' 5\" (1.651 m) 160 lb (72.6 kg) 94% 26.63 kg/m2 OB Status Smoking Status Postmenopausal Never Smoker Vitals History BMI and BSA Data Body Mass Index Body Surface Area  
 26.63 kg/m 2 1.82 m 2 Preferred Pharmacy Pharmacy Name Phone [address] [facility], [address] [place] [address] [address] [facility] Your Updated Medication List  
  
   
This list is accurate as of: 2/7/18  1:47 PM.  Always use your most recent med list.  
  
  
  
  
 alendronate 70 mg tablet Commonly known as:  FOSAMAX TAKE 1 TABLET BY MOUTH  EVERY SUNDAY  
  
 aspirin delayed-release 81 mg tablet Take 81 mg by mouth daily. atorvastatin 10 mg tablet Commonly known as:  LIPITOR Take 1 tablet by mouth  daily  
  
 calcium-cholecalciferol (D3) tablet Commonly known as:  CALTRATE 600+D Take 2 Tabs by mouth daily. co-enzyme Q-10 100 mg capsule Commonly known as:  CO Q-10 Take 200 mg by mouth daily. escitalopram oxalate 10 mg tablet Commonly known as:  Josefina Wetherington TAKE 1 TABLET BY MOUTH  DAILY  
  
 gabapentin 300 mg capsule Commonly known as:  NEURONTIN Two Capsules in the morning and One Capsule in the Evening Glucosamine &Chondroit-MV-Min3 [id number] mg Tab Take 2 Tabs by mouth daily. latanoprost 0.005 % ophthalmic solution Commonly known as:  XALATAN  
INSTILL 1 DROP IN BOTH EYES EVERY NIGHT AT BEDTIME  
  
 losartan 50 mg tablet Commonly known as:  COZAAR  
 Take 1/2 tab daily--Dose change 4/27/17--updated med list--did not send prescription to the pharmacy MIRALAX PO Take  by mouth. 1-2  tsp daily OMEGA 3 FISH OIL PO Take 1 Cap by mouth two (2) times a day. SYNTHROID 75 mcg tablet Generic drug:  levothyroxine Take 1 tablet by mouth on Monday through Saturday and none on Sunday at bedtime or 30 minutes before coffee in the morning  
  
 traMADol 50 mg tablet Commonly known as:  ULTRAM  
Take 1 Tab by mouth every six (6) hours as needed for Pain.  
  
 vit B Cmplx 3-FA-Vit C-Biotin 1- mg-mg-mcg tablet Commonly known as:  NEPHRO BANDAR RX Take 1 Tab by mouth daily. We Performed the Following AMB POC EKG ROUTINE W/ 12 LEADS, INTER & REP [09945 CPT(R)] Introducing [facility]! Dear Aj Alvarado: 
Thank you for requesting a [employer] account. Our records indicate that you already have an active [employer] account. You can access your account anytime at [email or web address] Did you know that you can access your hospital and ER discharge instructions at any time in [employer]? You can also review all of your test results from your hospital stay or ER visit. Additional Information If you have questions, please visit the Frequently Asked Questions section of the [employer] website at [email or web address] Remember, [employer] is NOT to be used for urgent needs. For medical emergencies, dial 911. Now available from your iPhone and Android! Please provide this summary of care documentation to your next provider. Your primary care clinician is listed as [place]. If you have any questions after today's visit, please call [phone].

## 2018-02-07 NOTE — PROGRESS NOTES
1. Have you been to the ER, urgent care clinic since your last visit? Hospitalized since your last visit? No    2. Have you seen or consulted any other health care providers outside of the [address] [place] [address] since your last visit? Include any pap smears or colon screening. No    Chief Complaint   Patient presents with    Hypertension     6 mo appt. Denied cardiac symptoms.

## 2018-02-08 NOTE — PROGRESS NOTES
[address] [facility]        [phone]                             NEW PATIENT HPI/FOLLOW-UP    NAME:  Nicki Collins   :   3/6/1932   MRN:   [id number]   PCP:  Qing Prajapati MD           Subjective: The patient is a 80y.o. year old female  who returns for a routine follow-up. Since the last visit, patient reports another near-syncopal episode -- for which she was evaluated in ED ED [facility]-- felt due to orthostatic hypotension with SBP into 40's when taken by observer at home. Has had episodes prior to 2016 and in 2016 at time of pacemaker placement surrounding syncopal episode in setting of conduction disturbance. Has not had pacemaker check since at [facility] where SHARA HEBERT is. Denies change in exercise tolerance, chest pain, edema, medication intolerance, palpitations, shortness of breath, PND/orthopnea wheezing, sputum. Doing satisfactorily now. .      Review of Systems  General: Pt denies excessive weight gain or loss. Pt is able to conduct ADL's. Respiratory: Denies shortness of breath, CHOW, wheezing or stridor.   Cardiovascular: Denies precordial pain, palpitations, edema or PND;+syncope  Gastrointestinal: Denies poor appetite, indigestion, abdominal pain or blood in stool  Peripheral vascular: Denies claudication, leg cramps  Neurological: Denies paresthesias, tingling.numbness  Psychiatric: Denies anxiety,depression,fatigue  Musculoskeletal: Denies pain,tenderness, soreness,swelling      Past Medical History:   Diagnosis Date    Acquired hypothyroidism 10/30/2017    Arthritis     Back pain 2011    Cancer ([facility] [address] 1993    colon    Chronic pain     in her back    Dementia     Mild; Neuropsych testing with Dr. Lindy Morris in     Dementia 2011    Epigastric pain 2011    Essential hypertension     Essential hypertension 2015    Hyperlipidemia 2011    Hypertension 2011    Hypothyroidism 2011    s/p VALDES in  and  for hyperthyroidism    Neuropathy 2/16/2011    Orthostasis 7/6/2012    Orthostasis 7/6/2012    Osteoporosis 2/16/2011    Pacemaker     Persistent disorder of initiating or maintaining sleep 4/22/2015    S/P cardiac pacemaker procedure 8/24/2011    [facility] 8/20/2011    Syncope     Syncope and collapse 8/19/2011    Urinary frequency 2/16/2011     Patient Active Problem List    Diagnosis Date Noted    Acquired hypothyroidism 10/30/2017    Stenosis of both internal carotid arteries 12/16/2016    Syncope 12/15/2016    Hypokalemia 12/15/2016    Essential hypertension 12/04/2015    Persistent disorder of initiating or maintaining sleep 04/22/2015    Anxiety disorder 12/04/2012    Syncope and collapse 07/11/2012    Syncope due to orthostatic hypotension 07/11/2012    Orthostasis 07/06/2012    Essential hypertension, benign 06/01/2012    Mobitz (type) II atrioventricular block 06/01/2012    Cardiac pacemaker in situ 06/01/2012    DJD (degenerative joint disease) of knee 01/25/2012    S/P cardiac pacemaker procedure 08/24/2011    SSS (sick sinus syndrome) ([facility] 08/20/2011    Depression 07/21/2011    Dementia 07/21/2011    Personal history of colon cancer 06/23/2011    Epigastric pain 02/16/2011    Hyperlipidemia 02/16/2011    Neuropathy 02/16/2011    Postablative hypothyroidism 02/16/2011    Urinary frequency 02/16/2011    Osteoporosis 02/16/2011      Past Surgical History:   Procedure Laterality Date    HX GI  1993    colon resection    HX HEENT      tonsillectomy in 1950    HX PACEMAKER      VT ENTEROSCOPY > 2ND PRTN ILEUM CONTROL BLEEDING  6/23/2011          Allergies   Allergen Reactions    Amoxicillin Unknown (comments)    Aricept [Donepezil] Other (comments)     Bad dreams.     Augmentin [Amoxicillin-Pot Clavulanate] Unknown (comments)    Pcn [Penicillins] Rash    Zithromax [Azithromycin] Unknown (comments)      Family History   Problem Relation Age of Onset    Diabetes Mother     Thyroid Disease Mother     Heart Disease Mother     Hypertension Father     Heart Disease Father     Diabetes Sister     Diabetes Brother       Social History     Social History    Marital status:      Spouse name: N/A    Number of children: N/A    Years of education: N/A     Occupational History    Not on file. Social History Main Topics    Smoking status: Never Smoker    Smokeless tobacco: Never Used    Alcohol use No    Drug use: No    Sexual activity: Not on file     Other Topics Concern    Not on file     Social History Narrative    Lives in [place] with  of 54 years. Has a son and daughter. Used to work as a  for [employer] and at [employer] firm. Likes to garden and craft. Current Outpatient Prescriptions   Medication Sig    alendronate (FOSAMAX) 70 mg tablet TAKE 1 TABLET BY MOUTH  EVERY SUNDAY    escitalopram oxalate (LEXAPRO) 10 mg tablet TAKE 1 TABLET BY MOUTH  DAILY    atorvastatin (LIPITOR) 10 mg tablet Take 1 tablet by mouth  daily    SYNTHROID 75 mcg tablet Take 1 tablet by mouth on Monday through Saturday and none on Sunday at bedtime or 30 minutes before coffee in the morning    gabapentin (NEURONTIN) 300 mg capsule Two Capsules in the morning and One Capsule in the Evening    losartan (COZAAR) 50 mg tablet Take 1/2 tab daily--Dose change 4/27/17--updated med list--did not send prescription to the pharmacy    traMADol (ULTRAM) 50 mg tablet Take 1 Tab by mouth every six (6) hours as needed for Pain.  latanoprost (XALATAN) 0.005 % ophthalmic solution INSTILL 1 DROP IN BOTH EYES EVERY NIGHT AT BEDTIME    co-enzyme Q-10 (CO Q-10) 100 mg capsule Take 200 mg by mouth daily.  Glucosamine &Chondroit-MV-Min3 287-775-11-0.5 mg tab Take 2 Tabs by mouth daily.  calcium-cholecalciferol, D3, (CALTRATE 600+D) tablet Take 2 Tabs by mouth daily.     vit B Cmplx 3-FA-Vit C-Biotin (NEPHRO BANDAR RX) 1- mg-mg-mcg tablet Take 1 Tab by mouth daily.  OMEGA-3 FATTY ACIDS/FISH OIL (OMEGA 3 FISH OIL PO) Take 1 Cap by mouth two (2) times a day.  POLYETHYLENE GLYCOL 3350 (MIRALAX PO) Take  by mouth. 1-2   tsp daily    aspirin delayed-release 81 mg tablet Take 81 mg by mouth daily. No current facility-administered medications for this visit. I have reviewed the nurses notes, vitals, problem list, allergy list, medical history, family medical, social history and medications. Objective:     Physical Exam:     Vitals:    02/07/18 1303   BP: 132/78   Resp: 18   SpO2: 94%   Weight: 160 lb (72.6 kg)   Height: 5' 5\" (1.651 m)    Body mass index is 26.63 kg/(m^2). General: Well developed, in no acute distress. HEENT: No carotid bruits, no JVD, trach is midline. Heart:  Normal S1/S2 negative S3 or S4. Regular, no murmur, gallop or rub.   Respiratory: Clear bilaterally, no wheezing or rales  Abdomen:   Soft, non-tender, bowel sounds are active.   Extremities:  No edema, normal cap refill, no cyanosis. Neuro: A&Ox3, speech clear, gait stable. Skin: Skin color is normal. No rashes or lesions. No diaphoresis. Vascular: 2+ pulses symmetric in all extremities        Data Review:       Cardiographics:    EKG: Electronic ventricular pacemaker    Cardiology Labs:    Results for orders placed or performed during the hospital encounter of 12/24/17   EKG, 12 LEAD, INITIAL   Result Value Ref Range    Ventricular Rate 64 BPM    Atrial Rate 64 BPM    P-R Interval 216 ms    QRS Duration 174 ms    Q-T Interval 468 ms    QTC Calculation (Bezet) 482 ms    Calculated P Axis 52 degrees    Calculated R Axis -46 degrees    Calculated T Axis 105 degrees    Diagnosis       Atrial-sensed ventricular-paced rhythm with prolonged AV conduction  Abnormal ECG  When compared with ECG of 06-MAR-2017 14:15,  Vent.  rate has decreased BY   4 BPM  Confirmed by Johnna Gutierrez ([id number]) on 12/25/2017 12:29:04 PM         Lab Results   Component Value Date/Time    Cholesterol, total 159 12/16/2016 03:01 AM    HDL Cholesterol 63 12/16/2016 03:01 AM    LDL, calculated 80.8 12/16/2016 03:01 AM    Triglyceride 76 12/16/2016 03:01 AM    CHOL/HDL Ratio 2.5 12/16/2016 03:01 AM       Lab Results   Component Value Date/Time    Sodium 138 12/24/2017 02:09 PM    Potassium 4.4 12/24/2017 02:09 PM    Chloride 102 12/24/2017 02:09 PM    CO2 31 12/24/2017 02:09 PM    Anion gap 5 12/24/2017 02:09 PM    Glucose 98 12/24/2017 02:09 PM    BUN 17 12/24/2017 02:09 PM    Creatinine 0.96 12/24/2017 02:09 PM    BUN/Creatinine ratio 18 12/24/2017 02:09 PM    GFR est AA >60 12/24/2017 02:09 PM    GFR est non-AA 55 (L) 12/24/2017 02:09 PM    Calcium 9.1 12/24/2017 02:09 PM    Bilirubin, total 0.5 12/24/2017 02:09 PM    AST (SGOT) 26 12/24/2017 02:09 PM    Alk. phosphatase 65 12/24/2017 02:09 PM    Protein, total 7.0 12/24/2017 02:09 PM    Albumin 3.6 12/24/2017 02:09 PM    Globulin 3.4 12/24/2017 02:09 PM    A-G Ratio 1.1 12/24/2017 02:09 PM    ALT (SGPT) 31 12/24/2017 02:09 PM          Assessment:       ICD-10-CM ICD-9-CM    1. Syncope due to orthostatic hypotension I95.1 458.0    2. Vasovagal syndrome R55 780.2    3. Essential hypertension I10 401.9 AMB POC EKG ROUTINE W/ 12 LEADS, INTER & REP   4. Generalized anxiety disorder F41.1 300.02    5. Cardiac pacemaker in situ Z95.0 V45.01    6. Dementia without behavioral disturbance, unspecified dementia type F03.90 294.20          Discussion: Patient presents at this time with vasovagal syndrome/autonamic dysfunction combined with orthostatic near-syncope and syncope--complex. Partially corrected with pacemaker and exacerbated by vasodilator  BP rx. Has in-home Nurse caregiver who primarily takes care of  during the day. Will have martín diary of BP's both at rest and standing to exclude orthostatic changes. Call results in 1-2 weeks. If orthostatic then changes BP rx--on Losartan only.  Also encouraged to have pacemaker check MCV to exclude cardiac arrhythmia. Plan: 1. Continue same meds. Lipid profile and labs followed by PCP. 2.Encouraged to exercise to tolerance and follow low fat, low cholesterol, low sodium predominantly Plant-based (consider Mediterranean) diet. Call with questions or concerns. Will follow up any test results by phone and/or f/u here in office if needed. Peyton Desouza 3.Follow up: 6 month    I have discussed the diagnosis with the patient and the intended plan as seen in the above orders. The patient has received an after-visit summary and questions were answered concerning future plans. I have discussed any concerning medication side effects and warnings with the patient as well.     Faraz Tabares MD  2/7/2018

## 2018-03-12 NOTE — PROGRESS NOTES
SUBJECTIVE  Ms. Nicki Collins presents today acutely for     Chief Complaint   Patient presents with   [employer] Fall     pt here today stating that she has had 3 falls since Xavi Sylvia due to her blood pressure dropping; pt c/o pain in  R shoulder; pt was [facility] at [facility]     Shoulder Pain     pt c/o pain in R shoulder; due to falls; pt rates pain today 2/10 on pain scale       She has some pain in the R shoulder that \"was right bad\" but improving. She fell on it about four weeks ago. She had an episode of \"falling out\" Xavi sylvia. She was brought to ER:     Nicki Collins, 80 y.o. female with PMHx significant for HTN, thyroid dz, osteoporosis, colon CA, s/p pacemaker, presents ambulatory to the ED with cc of sudden onset of a mild to moderate syncopal event x this morning. Pt's son states he did not witness the event and notes the pt was found on the floor by the in-home nurse, who took the pt's BP at home. Son reports pt's initial systolic BP was in the 84U when he arrived in the room ~10 minute s/p CC onset and improved to the 100s. Pt denies any warning sx though does c/o a R sided headache, and states she struck the R side of her head when she fell to the floor. Pt specifically denies any N/V, CP, SOB, no other pain, vision changes, N/T, slurred speech, or feeling any pacemaker shocks. Patient walks with a cane. Son reports h/o multiple similar episodes and notes that pt recently had her blood pressure medication dose reduced twice. Pt takes daily ASA. BP was a bit low. She has had two episodes of near-syncope. The most recent \"fall\" was also a syncopal episode. She was in kitchen, reaching for a cell phone when she just collapsed. I have reviewed the information regarding the Medicare Annual Well Visit as documented by my nurse navigator; Agree with assessment.      Past Medical History:   Diagnosis Date    Acquired hypothyroidism 10/30/2017    Arthritis     Back pain 2/16/2011    Cancer [facility]) 1993    colon    Chronic pain     in her back    Dementia 2011    Mild; Neuropsych testing with Dr. Fiona Saha in 2011    Dementia 7/21/2011    Epigastric pain 2/16/2011    Essential hypertension     Essential hypertension 12/4/2015    Hyperlipidemia 2/16/2011    Hypertension 2/16/2011    Hypothyroidism 2/16/2011    s/p VALDES in 1998 and 1999 for hyperthyroidism    Neuropathy 2/16/2011    Orthostasis 7/6/2012    Orthostasis 7/6/2012    Osteoporosis 2/16/2011    Pacemaker     Persistent disorder of initiating or maintaining sleep 4/22/2015    S/P cardiac pacemaker procedure 8/24/2011    SSS (sick sinus syndrome) ([facility] [address] 8/20/2011    Syncope     Syncope and collapse 8/19/2011    Urinary frequency 2/16/2011       SH: She reports that she has never smoked. She has never used smokeless tobacco. She reports that she does not drink alcohol or use illicit drugs. ROS: Complete review of systems was performed and is otherwise unremarkable except as noted elsewhere. OBJECTIVE  Visit Vitals    BP (!) 203/93 (BP 1 Location: Left arm, BP Patient Position: Sitting)    Pulse 65    Temp 98.6 °F (37 °C) (Oral)    Resp 16    Ht 5' 5\" (1.651 m)    Wt 166 lb 3.2 oz (75.4 kg)    SpO2 97%    BMI 27.66 kg/m2     Gen: Pleasant 80 y.o.  female in NAD.   HEENT: PERRLA. EOMI. OP moist and pink.  Neck: Supple.  No LAD.  HEART: RRR, No M/G/R.   LUNGS: CTAB No W/R.   ABDOMEN: S, NT, ND, BS+.   EXTREMITIES: Warm. No C/C/E. MUSCULOSKELETAL: Normal ROM, muscle strength 5/5 all groups. NEURO: Alert and oriented x 3.  Cranial nerves grossly intact.  No focal sensory or motor deficits noted. SKIN: Warm. Dry. No rashes or other lesions noted.     Lab Results   Component Value Date/Time    Sodium 138 12/24/2017 02:09 PM    Potassium 4.4 12/24/2017 02:09 PM    Chloride 102 12/24/2017 02:09 PM    CO2 31 12/24/2017 02:09 PM    Anion gap 5 12/24/2017 02:09 PM    Glucose 98 12/24/2017 02:09 PM    BUN 17 12/24/2017 02:09 PM    Creatinine 0.96 12/24/2017 02:09 PM    BUN/Creatinine ratio 18 12/24/2017 02:09 PM    GFR est AA >60 12/24/2017 02:09 PM    GFR est non-AA 55 (L) 12/24/2017 02:09 PM    Calcium 9.1 12/24/2017 02:09 PM     Lab Results   Component Value Date/Time    WBC 7.5 12/24/2017 02:09 PM    HGB 13.3 12/24/2017 02:09 PM    HCT 39.5 12/24/2017 02:09 PM    PLATELET 036 68/88/2547 02:09 PM    MCV 92.1 12/24/2017 02:09 PM         ASSESSMENT / PLAN    ICD-10-CM ICD-9-CM    1. Acute pain of right shoulder M25.511 719.41 XR SHOULDER RT 1V      REFERRAL TO ORTHOPEDICS   2. Syncope, unspecified syncope type R55 780.2 REFERRAL TO CARDIAC ELECTROPHYSIOLOGY       I have reviewed with the patient details of the assessment and plan and all questions were answered. Relevant patient education was performed. Follow-up Disposition:  Return if symptoms worsen or fail to improve.

## 2018-03-12 NOTE — MR AVS SNAPSHOT
[address] 
[phone] Patient: Hunter Blanton MRN: [id number] :3/6/1932 Visit Information Date & Time Provider Department Dept. Phone Encounter #  
 3/12/2018  3:00 PM Sakina Stewart, [address] [place] [address],[address] [phone] [id number] Follow-up Instructions Return if symptoms worsen or fail to improve. Follow-up and Disposition History Your Appointments 5/3/2018 10:45 AM  
ROUTINE CARE with Sakina Stewart MD  
[facility] App[facility] Note: 6 month follow up; 6 month follow up  
 [facility] [address] [address]  
[address]  [address] [facility] [address] 2018  9:50 AM  
Follow Up with Johnnie Painter MD  
[facility]) Appt Note: 6 month f/u Thyroid [address] [id number] [address] [place] [address]  
  
    
 2018  1:00 PM  
ESTABLISHED PATIENT with Piotr Diaz MD  
[facility]) Appt Note: 6 MONTH FOLLOW UP PER DR. HOU 18 [address] [facility] Body [id number]  
[phone]  
  
   
 [address] [place] [address] [address] [facility] [address] Upcoming Health Maintenance Date Due Bone Densitometry (Dexa) Screening 3/6/1997 GLAUCOMA SCREENING Q2Y 2016 MEDICARE YEARLY EXAM 2018 DTaP/Tdap/Td series (2 - Td) 10/22/2025 Allergies as of 3/12/2018  Review Complete On: 3/12/2018 By: Sakina Stewart MD  
  
 Severity Noted Reaction Type Reactions Amoxicillin  2011    Unknown (comments) Aricept [Donepezil]  2012    Other (comments) Bad dreams. Augmentin [Amoxicillin-pot Clavulanate]  2011    Unknown (comments) Pcn [Penicillins]  12/20/2016    Rash Zithromax [Azithromycin]  02/16/2011    Unknown (comments) Current Immunizations  Reviewed on 10/22/2015 Name Date Influenza High Dose Vaccine PF 10/30/2017 11:45 AM  
 Influenza Vaccine 10/22/2014 10:31 AM, 10/21/2013 Influenza Vaccine Wilver Sung) 10/22/2015 Influenza Vaccine (Quad) PF 10/27/2016 Influenza Vaccine Split 10/9/2012, 10/3/2011 Influenza Vaccine Whole 10/1/2010 Pneumococcal Conjugate (PCV-13) 11/17/2015 Tdap 10/22/2015 ZZZ-RETIRED (DO NOT USE) Pneumococcal Vaccine (Unspecified Type) 10/1/2010 Not reviewed this visit You Were Diagnosed With   
  
 Codes Comments Acute pain of right shoulder    -  Primary ICD-10-CM: M25.511 ICD-9-CM: 719.41 Syncope, unspecified syncope type     ICD-10-CM: R55 
ICD-9-CM: 780. 2 Vitals BP Pulse Temp Resp Height(growth percentile) Weight(growth percentile) (!) 203/93 (BP 1 Location: Left arm, BP Patient Position: Sitting) 65 98.6 °F (37 °C) (Oral) 16 5' 5\" (1.651 m) 166 lb 3.2 oz (75.4 kg) SpO2 BMI OB Status Smoking Status 97% 27.66 kg/m2 Postmenopausal Never Smoker Vitals History BMI and BSA Data Body Mass Index Body Surface Area  
 27.66 kg/m 2 1.86 m 2 Preferred Pharmacy Pharmacy Name Phone [facility] [address] [place] [address] [phone] Your Updated Medication List  
  
   
This list is accurate as of 3/12/18  3:43 PM.  Always use your most recent med list.  
  
  
  
  
 alendronate 70 mg tablet Commonly known as:  FOSAMAX TAKE 1 TABLET BY MOUTH  EVERY SUNDAY  
  
 aspirin delayed-release 81 mg tablet Take 81 mg by mouth daily. atorvastatin 10 mg tablet Commonly known as:  LIPITOR Take 1 tablet by mouth  daily  
  
 calcium-cholecalciferol (D3) tablet Commonly known as:  CALTRATE 600+D Take 2 Tabs by mouth daily. co-enzyme Q-10 100 mg capsule Commonly known as:  CO Q-10 Take 200 mg by mouth daily. escitalopram oxalate 10 mg tablet Commonly known as:  Seretha Priscilla TAKE 1 TABLET BY MOUTH  DAILY  
  
 gabapentin 300 mg capsule Commonly known as:  NEURONTIN Two Capsules in the morning and One Capsule in the Evening Glucosamine &Chondroit-MV-Min3 462-091-94-0.5 mg Tab Take 2 Tabs by mouth daily. latanoprost 0.005 % ophthalmic solution Commonly known as:  XALATAN  
INSTILL 1 DROP IN BOTH EYES EVERY NIGHT AT BEDTIME  
  
 losartan 50 mg tablet Commonly known as:  COZAAR Take 1/2 tab daily--Dose change 4/27/17--updated med list--did not send prescription to the pharmacy MIRALAX PO Take  by mouth. 1-2  tsp daily OMEGA 3 FISH OIL PO Take 1 Cap by mouth two (2) times a day. SYNTHROID 75 mcg tablet Generic drug:  levothyroxine Take 1 tablet by mouth on Monday through Saturday and none on Sunday at bedtime or 30 minutes before coffee in the morning  
  
 traMADol 50 mg tablet Commonly known as:  ULTRAM  
Take 1 Tab by mouth every six (6) hours as needed for Pain.  
  
 vit B Cmplx 3-FA-Vit C-Biotin 1- mg-mg-mcg tablet Commonly known as:  NEPHRO BANDAR RX Take 1 Tab by mouth daily. We Performed the Following REFERRAL TO CARDIAC ELECTROPHYSIOLOGY [REF11 Custom] REFERRAL TO ORTHOPEDICS [KMT231 Custom] Follow-up Instructions Return if symptoms worsen or fail to improve. To-Do List   
 03/12/2018 Imaging:  XR SHOULDER RT 1V Referral Information Referral ID Referred By Referred To  
  
 [id number] Jd Moreno MD   
   [address] [place] [address] [address] [place] [address] Phone: [phone] Fax: [phone] Visits Status Start Date End Date 1 New Request 3/12/18 3/12/19  If your referral has a status of pending review or denied, additional information will be sent to support the outcome of this decision. Referral ID Referred By Referred To  
 [id number] Megan LOWERY MD  
   [facility], [address] [place] [address] Phone: [phone] Fax: [phone] Visits Status Start Date End Date 1 New Request 3/12/18 3/12/19 If your referral has a status of pending review or denied, additional information will be sent to support the outcome of this decision. Introducing [facility]! Dear Reinaldo Roles: 
Thank you for requesting a [employer] account. Our records indicate that you already have an active [employer] account. You can access your account anytime at [email or web address] Did you know that you can access your hospital and ER discharge instructions at any time in [employer]? You can also review all of your test results from your hospital stay or ER visit. Additional Information If you have questions, please visit the Frequently Asked Questions section of the [employer] website at [email or web address] Remember, [employer] is NOT to be used for urgent needs. For medical emergencies, dial 911. Now available from your iPhone and Android! Please provide this summary of care documentation to your next provider. Your primary care clinician is listed as [place]. If you have any questions after today's visit, please call [phone].

## 2018-03-12 NOTE — PROGRESS NOTES
1. Have you been to the ER, urgent care clinic since your last visit? Hospitalized since your last visit? [facility]    2. Have you seen or consulted any other health care providers outside of the [address] [place] [address] since your last visit? Include any pap smears or colon screening.  NO

## 2018-03-23 NOTE — ED TRIAGE NOTES
Assumed care of pt via EMS stretcher. Per EMS they were called for a GLF. Pt was at home and fell in the kitchen. Pt does not remember the fall.  and Husbands nurse heard the pt fall and responded to her. Pt was alert at that time. Unknown if LOC. Pt reports lower bilat knee pain. Pt is A&O x 4. On monitor x 2. Pt reports she uses a cane at home but was not using it at the time of the fall. Pt fell 3 weeks ago and did not come to the hospital.  Pt also fell just before monica and was transported at the hospital.  Pt resting on stretcher in POC with family at bedside.

## 2018-03-24 NOTE — ED PROVIDER NOTES
EMERGENCY DEPARTMENT HISTORY AND PHYSICAL EXAM      Date: 3/23/2018  Patient Name: Son Solis    History of Presenting Illness     No chief complaint on file. History Provided By: Patient and Patient's Son    HPI: Son Solis, 80 y.o. female with PMHx significant for HTN, hypothyroidism, mild dementia, presents via EMS to the ED for evaluation after a syncopal episode that occurred earlier today. Per the pt's son, pt has had syncopal episodes in the past, the last one being 4-6 weeks ago. Pt states she was in the kitchen, but does not remember the events preceding her LOC. She reports having some bilateral lower extremity pain below her knees that began s/p her GLF. Pt currently denies any CP. PCP: Enriqueta Morales MD    There are no other complaints, changes, or physical findings at this time. Current Outpatient Prescriptions   Medication Sig Dispense Refill    traMADol (ULTRAM) 50 mg tablet Take 1 Tab by mouth every six (6) hours as needed for Pain. Max Daily Amount: 200 mg. Indications: Pain 10 Tab 0    alendronate (FOSAMAX) 70 mg tablet TAKE 1 TABLET BY MOUTH  EVERY SUNDAY 12 Tab 3    escitalopram oxalate (LEXAPRO) 10 mg tablet TAKE 1 TABLET BY MOUTH  DAILY 90 Tab 1    atorvastatin (LIPITOR) 10 mg tablet Take 1 tablet by mouth  daily 90 Tab 3    SYNTHROID 75 mcg tablet Take 1 tablet by mouth on Monday through Saturday and none on Sunday at bedtime or 30 minutes before coffee in the morning 90 Tab 3    gabapentin (NEURONTIN) 300 mg capsule Two Capsules in the morning and One Capsule in the Evening      losartan (COZAAR) 50 mg tablet Take 1/2 tab daily--Dose change 4/27/17--updated med list--did not send prescription to the pharmacy 90 Tab 3    co-enzyme Q-10 (CO Q-10) 100 mg capsule Take 200 mg by mouth daily.  Glucosamine &Chondroit-MV-Min3 043-529-15-0.5 mg tab Take 2 Tabs by mouth daily.  calcium-cholecalciferol, D3, (CALTRATE 600+D) tablet Take 2 Tabs by mouth daily.       vit B Cmplx 3-FA-Vit C-Biotin (NEPHRO BANDAR RX) 1- mg-mg-mcg tablet Take 1 Tab by mouth daily.  OMEGA-3 FATTY ACIDS/FISH OIL (OMEGA 3 FISH OIL PO) Take 1 Cap by mouth two (2) times a day.  POLYETHYLENE GLYCOL 3350 (MIRALAX PO) Take  by mouth. 1-2   tsp daily      aspirin delayed-release 81 mg tablet Take 81 mg by mouth daily.       latanoprost (XALATAN) 0.005 % ophthalmic solution INSTILL 1 DROP IN BOTH EYES EVERY NIGHT AT BEDTIME  12       Past History     Past Medical History:  Past Medical History:   Diagnosis Date    Acquired hypothyroidism 10/30/2017    Arthritis     Back pain 2/16/2011    Cancer ([facility] [address] 1993    colon    Chronic pain     in her back    Dementia 2011    Mild; Neuropsych testing with Dr. Lakisha Metz in 2011    Dementia 7/21/2011    Epigastric pain 2/16/2011    Essential hypertension     Essential hypertension 12/4/2015    Hyperlipidemia 2/16/2011    Hypertension 2/16/2011    Hypothyroidism 2/16/2011    s/p VALDES in 1998 and 1999 for hyperthyroidism    Neuropathy 2/16/2011    Orthostasis 7/6/2012    Orthostasis 7/6/2012    Osteoporosis 2/16/2011    Pacemaker     Persistent disorder of initiating or maintaining sleep 4/22/2015    S/P cardiac pacemaker procedure 8/24/2011    SSS (sick sinus syndrome) ([facility] [address] 8/20/2011    Syncope     Syncope and collapse 8/19/2011    Urinary frequency 2/16/2011       Past Surgical History:  Past Surgical History:   Procedure Laterality Date    HX GI  1993    colon resection    HX HEENT      tonsillectomy in 1950    HX PACEMAKER      AK ENTEROSCOPY > 2ND PRTN ILEUM CONTROL BLEEDING  6/23/2011            Family History:  Family History   Problem Relation Age of Onset    Diabetes Mother     Thyroid Disease Mother     Heart Disease Mother     Hypertension Father     Heart Disease Father     Diabetes Sister     Diabetes Brother        Social History:  Social History   Substance Use Topics    Smoking status: Never Smoker    Smokeless tobacco: Never Used    Alcohol use No       Allergies: Allergies   Allergen Reactions    Amoxicillin Unknown (comments)    Aricept [Donepezil] Other (comments)     Bad dreams.  Augmentin [Amoxicillin-Pot Clavulanate] Unknown (comments)    Pcn [Penicillins] Rash    Zithromax [Azithromycin] Unknown (comments)         Review of Systems   Review of Systems   Constitutional: Negative for chills and fever. HENT: Negative for congestion and sore throat. Eyes: Negative for visual disturbance. Respiratory: Negative for cough and shortness of breath. Cardiovascular: Negative for chest pain and leg swelling. Gastrointestinal: Negative for abdominal pain, blood in stool, diarrhea and nausea. Endocrine: Negative for polyuria. Genitourinary: Negative for dysuria, flank pain, vaginal bleeding and vaginal discharge. Musculoskeletal: Positive for myalgias. Skin: Negative for rash. Allergic/Immunologic: Negative for immunocompromised state. Neurological: Positive for syncope. Negative for weakness and headaches. Psychiatric/Behavioral: Negative for confusion. Physical Exam   Physical Exam   Constitutional: She is oriented to person, place, and time. She appears well-developed and well-nourished. HENT:   Head: Normocephalic and atraumatic. Moist mucous membranes. Eyes: Conjunctivae are normal. Pupils are equal, round, and reactive to light. Right eye exhibits no discharge. Left eye exhibits no discharge. Neck: Normal range of motion. Neck supple. No tracheal deviation present. Neck cleared clinically, full ROM, no midline tenderness. Cardiovascular: Normal rate, regular rhythm and normal heart sounds. No murmur heard. Palpable pulses in DP bilaterally, normal cap refill   Pulmonary/Chest: Effort normal and breath sounds normal. No respiratory distress. She has no wheezes. She has no rales. Abdominal: Soft. Bowel sounds are normal. There is no tenderness.  There is no rebound and no guarding. Musculoskeletal: Normal range of motion. She exhibits tenderness (right leg over tib/fib). She exhibits no edema or deformity. Normal strength with plantar flexion and dorsiflexion of feet. 2+ pulses in DP and PT bilaterally. Normal cap refill   Neurological: She is alert and oriented to person, place, and time. Skin: Skin is warm and dry. No rash noted. No erythema. Psychiatric: Her behavior is normal.   Nursing note and vitals reviewed. Diagnostic Study Results     Labs -     No results found for this or any previous visit (from the past 12 hour(s)). Radiologic Studies -   XR TIB/FIB RT   Final Result   FINDINGS: There is an acute comminuted nondisplaced fracture at the proximal  fibular diaphysis. There is no other acute bony abnormality. There is mild  degenerative change in the knee. The soft tissues are unremarkable.     IMPRESSION  IMPRESSION: Acute comminuted nondisplaced proximal fibular diaphyseal fracture. CT Results  (Last 48 hours)               03/23/18 2116  CT HEAD WO CONT Final result    Impression:  IMPRESSION:    1. No acute intracranial abnormality. 2. Paranasal sinus inflammatory disease, stable without air-fluid levels. Narrative:  EXAM:  CT HEAD WO CONT       INDICATION:   Patient at home and fell in the kitchen. Does not remember fall. Kayla Cervantes 3 weeks ago but did not come to the hospital. History of more remote falls. COMPARISON: 12/24/2017. CONTRAST:  None. TECHNIQUE: Unenhanced CT of the head was performed using 5 mm images. Brain and   bone windows were generated. CT dose reduction was achieved through use of a   standardized protocol tailored for this examination and automatic exposure   control for dose modulation. FINDINGS:   Generalized prominence of cerebral sulci and ventricles is mildly increased but   commensurate with age. Periventricular white matter low-density is also mild and   stable. . .  There is no intracranial hemorrhage, extra-axial collection, mass,   mass effect or midline shift. The basilar cisterns are open. No acute infarct   is identified. The bone windows demonstrate no abnormalities. The left maxillary   sinus is completely opacified but stable. There are no air-fluid levels. .               CXR Results  (Last 48 hours)               03/23/18 2148  XR CHEST PORT Final result    Impression:  IMPRESSION:   No acute cardiopulmonary disease radiographically. .  . Narrative:  INDICATION:  syncope        EXAM: Chest single view. COMPARISON: 12/24/2017. FINDINGS: A single frontal view of the chest at 2142 hours shows clear lungs. The heart, mediastinum and pulmonary vasculature are stable . Transvenous   pacemaker from the left is stable. Mild elevation of the right hemidiaphragm is   stable. .  The bony thorax is unremarkable for age, with a compression deformity   stable in the lower thoracic spine. . . Medical Decision Making   I am the first provider for this patient. I reviewed the vital signs, available nursing notes, past medical history, past surgical history, family history and social history. Vital Signs-Reviewed the patient's vital signs. No data found. Pulse Oximetry Analysis - 94% on room air    Cardiac Monitor:   Rate: 66 bpm  Rhythm: Normal Sinus Rhythm 152/72     EKG interpretation: (Preliminary)  9:34 PM  Rhythm: paced; and regular . Rate (approx.): 72; Axis: normal; CT interval: normal; QRS interval: normal at 152 ms; ST/T wave: normal at 430/470 ms; Other findings: abnormal ekg. Written by Carline Guillen ED Scribtian, as dictated by Caye Nyhan, DO. Records Reviewed: Nursing Notes, Old Medical Records and Ambulance Run Sheet    Provider Notes (Medical Decision Making):   DDx: Syncope secondary to arrhythmia, electrolyte disturbance, orthostasis, UTI, possible injury of right leg.  Will rule out intercranial bleed from the fall, disposition after labs and pacemaker interrogation. ED Course:   Initial assessment performed. The patients presenting problems have been discussed, and they are in agreement with the care plan formulated and outlined with them. I have encouraged them to ask questions as they arise throughout their visit. 10:37 PM  Pacemaker was interrogated, no evidence of HR greater than 170.     11:04 PM  [place] [facility] orthopedics. 11:38 PM  Dr. Emry Gilford with orthopedics recommend ordering dedicated ankle views with stress views. He also states that if there is no evidence of ankle fracture, then she can be weight bearing. 1146 PM:  Efraín Middleton MD. Hospitalist. consulted for possible admission. He discussed case with cardiology they feel the patient can go home with normal telemetry here, normal pacer interrogation. They state she can get an outpatient echo. SIGN OUT:  11:08 PM  Patient's presentation, labs/imaging and plan of care was reviewed with Dr. Cesario Sanders as part of sign out. They will follow up on ankle xray and splint as appropriate as part of the plan discussed with the patient. Dr. Rossana Santiago assistance in completion of this plan is greatly appreciated but it should be noted that I will be the provider of record for this patient. 1:16 AM  Dr. Emry Gilford states patient does not need a posterior leg splint for known leg fracture. Updated patient on plan. Will prepare for discharge. Disposition:  DISCHARGE NOTE  The patient has been re-evaluated and is ready for discharge. Reviewed available results with patient. Counseled pt on diagnosis and care plan. Pt has expressed understanding, and all questions have been answered. Pt agrees with plan and agrees to F/U as recommended, or return to the ED if their sxs worsen. Discharge instructions have been provided and explained to the pt, along with reasons to return to the ED. PLAN:  1.    Discharge Medication List as of 3/24/2018 12:09 AM CONTINUE these medications which have CHANGED    Details   traMADol (ULTRAM) 50 mg tablet Take 1 Tab by mouth every six (6) hours as needed for Pain. Max Daily Amount: 200 mg. Indications: Pain, Print, Disp-10 Tab, R-0         CONTINUE these medications which have NOT CHANGED    Details   alendronate (FOSAMAX) 70 mg tablet TAKE 1 TABLET BY MOUTH  EVERY SUNDAY, Normal, Disp-12 Tab, R-3      escitalopram oxalate (LEXAPRO) 10 mg tablet TAKE 1 TABLET BY MOUTH  DAILY, Normal, Disp-90 Tab, R-1      atorvastatin (LIPITOR) 10 mg tablet Take 1 tablet by mouth  daily, Normal, Disp-90 Tab, R-3      SYNTHROID 75 mcg tablet Take 1 tablet by mouth on Monday through Saturday and none on Sunday at bedtime or 30 minutes before coffee in the morning, Normal, Disp-90 Tab, R-3, NICHO      gabapentin (NEURONTIN) 300 mg capsule Two Capsules in the morning and One Capsule in the Evening, Historical Med      losartan (COZAAR) 50 mg tablet Take 1/2 tab daily--Dose change 4/27/17--updated med list--did not send prescription to the pharmacy, No Print, Disp-90 Tab, R-3      co-enzyme Q-10 (CO Q-10) 100 mg capsule Take 200 mg by mouth daily. , Historical Med      Glucosamine &Chondroit-MV-Min3 462-436-20-0.5 mg tab Take 2 Tabs by mouth daily. , Historical Med      calcium-cholecalciferol, D3, (CALTRATE 600+D) tablet Take 2 Tabs by mouth daily. , Historical Med      vit B Cmplx 3-FA-Vit C-Biotin (NEPHRO BANDAR RX) 1- mg-mg-mcg tablet Take 1 Tab by mouth daily. , Historical Med      OMEGA-3 FATTY ACIDS/FISH OIL (OMEGA 3 FISH OIL PO) Take 1 Cap by mouth two (2) times a day., Historical Med      POLYETHYLENE GLYCOL 3350 (MIRALAX PO) Take  by mouth. 1-2   tsp daily, Historical Med      aspirin delayed-release 81 mg tablet Take 81 mg by mouth daily. , Historical Med      latanoprost (XALATAN) 0.005 % ophthalmic solution INSTILL 1 DROP IN BOTH EYES EVERY NIGHT AT BEDTIME, Historical Med, R-12           2.    Follow-up Information     Follow up With Details Comments Contact Info    Mayank Ospina MD Call in 2 days  [facility]  [facility]  [phone]      [facility] EMERGENCY DEPT  If symptoms worsen [address] [place] [address]  [phone]        Return to ED if worse     Diagnosis     Clinical Impression:   1. Closed nondisplaced comminuted fracture of shaft of right fibula, initial encounter    2. Syncope and collapse        Attestations: This note is prepared by Kena Sargent, acting as Scribe for Carolee Ashley DO. Carolee Ashley DO: The scribe's documentation has been prepared under my direction and personally reviewed by me in its entirety. I confirm that the note above accurately reflects all work, treatment, procedures, and medical decision making performed by me.

## 2018-03-24 NOTE — DISCHARGE INSTRUCTIONS
Fainting: Care Instructions  Your Care Instructions    When you faint, or pass out, you lose consciousness for a short time. A brief drop in blood flow to the brain often causes it. When you fall or lie down, more blood flows to your brain and you regain consciousness. Emotional stress, pain, or overheating-especially if you have been standing-can make you faint. In these cases, fainting is usually not serious. But fainting can be a sign of a more serious problem. Your doctor may want you to have more tests to rule out other causes. The treatment you need depends on the reason why you fainted. The doctor has checked you carefully, but problems can develop later. If you notice any problems or new symptoms, get medical treatment right away. Follow-up care is a key part of your treatment and safety. Be sure to make and go to all appointments, and call your doctor if you are having problems. It's also a good idea to know your test results and keep a list of the medicines you take. How can you care for yourself at home? · Drink plenty of fluids to prevent dehydration. If you have kidney, heart, or liver disease and have to limit fluids, talk with your doctor before you increase your fluid intake. When should you call for help? Call 911 anytime you think you may need emergency care. For example, call if:  ? · You have symptoms of a heart problem. These may include:  ¨ Chest pain or pressure. ¨ Severe trouble breathing. ¨ A fast or irregular heartbeat. ¨ Lightheadedness or sudden weakness. ¨ Coughing up pink, foamy mucus. ¨ Passing out. After you call 911, the  may tell you to chew 1 adult-strength or 2 to 4 low-dose aspirin. Wait for an ambulance. Do not try to drive yourself. ? · You have symptoms of a stroke. These may include:  ¨ Sudden numbness, tingling, weakness, or loss of movement in your face, arm, or leg, especially on only one side of your body. ¨ Sudden vision changes.   ¨ Sudden trouble speaking. ¨ Sudden confusion or trouble understanding simple statements. ¨ Sudden problems with walking or balance. ¨ A sudden, severe headache that is different from past headaches. ? · You passed out (lost consciousness) again. ? Watch closely for changes in your health, and be sure to contact your doctor if:  ? · You do not get better as expected. Where can you learn more? Go to http://vikash-maria g.info/. Enter P910 in the search box to learn more about \"Fainting: Care Instructions. \"  Current as of: March 20, 2017  Content Version: 11.4  © 0584-0029 [employer]. Care instructions adapted under license by [employer] (which disclaims liability or warranty for this information). If you have questions about a medical condition or this instruction, always ask your healthcare professional. [address] any warranty or liability for your use of this information.

## 2018-03-24 NOTE — ED NOTES
Assumed care of patient. Patient resting in bed, son at bedside. Patient has complaint of BLE pain, which she stated is worse on right. Patient son stated patient has had multiple falls over last several months. Will continue to monitor and assess patient needs.

## 2018-03-24 NOTE — ED NOTES
.Discharge instructions reviewed with patient & family and given to pt per MD Kevin Valencia. Pt & family able to return/verbalize discharge instructions. Copy of discharge instructions given. RX given to pt. Pt condition stable, no further complaints. Pt out of ER, accompanied by self & family. Assisted to car by w/c and ED staff. Patient out of ED prior to obtaining discharge vitals. Belongings & discharge paperwork out of ED with patient & family. Pt dressed self prior to discharge, transferred self to w/c. IV removed at d/c. Family to drive patient home.

## 2018-03-24 NOTE — ED NOTES
MD Terrence Gill went to bedside to update patient and son on plan of care. Patient stated that she doesn't feel that the pain medication has fully kicked in yet. Will continue to monitor and assess patient needs.

## 2018-03-24 NOTE — ED NOTES
Bedside and Verbal shift change report given to CHIDI Han (oncoming nurse) by CHIDI Denson (offgoing nurse). Report included the following information SBAR, ED Summary, Intake/Output, MAR and Recent Results. Monitor x 2. Call bell in reach. Bed in lowest position, with side rails up x 2. Pt updated on plan of care. Family at bedside.

## 2018-05-03 NOTE — PATIENT INSTRUCTIONS

## 2018-05-03 NOTE — MR AVS SNAPSHOT
[address] [facility] 
[phone] Patient: Cha Suh MRN: [id number] :3/6/1932 Visit Information Date & Time Provider Department Dept. Phone Encounter #  
 5/3/2018 10:45 AM Sima Campoverde, [address] [id number] Follow-up Instructions Return in about 6 months (around 11/3/2018) for HTN. Follow-up and Disposition History Your Appointments 2018  9:50 AM  
Follow Up with Melva Meadows MD  
[facility] [address] [place] [address]) Appt Note: 6 month f/u Thyroid [phone] [phone] [address]  
  
    
 2018  1:00 PM  
ESTABLISHED PATIENT with Francesca Cole MD  
[facility] [address] [place] [address] [address] [place] [address]) Appt Note: 6 MONTH FOLLOW UP PER DR. HOU 18 [address] [facility] [address]  
[phone]  
  
   
 [address] [place] [address] [address] [place] [address] Road,[address]Nd Floor Upcoming Health Maintenance Date Due  
 GLAUCOMA SCREENING Q2Y 2016 MEDICARE YEARLY EXAM 2018 Influenza Age 5 to Adult 2018 DTaP/Tdap/Td series (2 - Td) 10/22/2025 Allergies as of 5/3/2018  Review Complete On: 5/3/2018 By: Sima Campoverde MD  
  
 Severity Noted Reaction Type Reactions Amoxicillin  2011    Unknown (comments) Aricept [Donepezil]  2012    Other (comments) Bad dreams. Augmentin [Amoxicillin-pot Clavulanate]  2011    Unknown (comments) Pcn [Penicillins]  2016    Rash Zithromax [Azithromycin]  2011    Unknown (comments) Current Immunizations  Reviewed on 10/22/2015 Name Date Influenza High Dose Vaccine PF 10/30/2017 11:45 AM  
 Influenza Vaccine 10/22/2014 10:31 AM, 10/21/2013 Influenza Vaccine Bianca Blight) 10/22/2015 Influenza Vaccine (Quad) PF 10/27/2016 Influenza Vaccine Split 10/9/2012, 10/3/2011 Influenza Vaccine Whole 10/1/2010 Pneumococcal Conjugate (PCV-13) 11/17/2015 Tdap 10/22/2015 ZZZ-RETIRED (DO NOT USE) Pneumococcal Vaccine (Unspecified Type) 10/1/2010 Not reviewed this visit You Were Diagnosed With   
  
 Codes Comments Medicare annual wellness visit, subsequent    -  Primary ICD-10-CM: Z00.00 ICD-9-CM: V70.0 Acquired hypothyroidism     ICD-10-CM: E03.9 ICD-9-CM: 244.9 Mixed hyperlipidemia     ICD-10-CM: E78.2 ICD-9-CM: 272.2 Essential hypertension     ICD-10-CM: I10 
ICD-9-CM: 401.9 Syncope, unspecified syncope type     ICD-10-CM: R55 
ICD-9-CM: 780.2 Generalized anxiety disorder     ICD-10-CM: F41.1 ICD-9-CM: 300.02 Neuropathy     ICD-10-CM: G62.9 ICD-9-CM: 693. 9 Vitals BP Pulse Temp Resp Height(growth percentile) Weight(growth percentile) 135/82 (BP 1 Location: Left arm, BP Patient Position: Sitting) 75 97.8 °F (36.6 °C) (Oral) 18 5' 5\" (1.651 m) 166 lb (75.3 kg) SpO2 BMI OB Status Smoking Status 99% 27.62 kg/m2 Postmenopausal Never Smoker Vitals History BMI and BSA Data Body Mass Index Body Surface Area  
 27.62 kg/m 2 1.86 m 2 Preferred Pharmacy Pharmacy Name Phone [address] [place] [address], [address] [place] [address] [address] [facility] 134 Your Updated Medication List  
  
   
This list is accurate as of 5/3/18 11:21 AM.  Always use your most recent med list.  
  
  
  
  
 alendronate 70 mg tablet Commonly known as:  FOSAMAX TAKE 1 TABLET BY MOUTH  EVERY SUNDAY  
  
 aspirin delayed-release 81 mg tablet Take 81 mg by mouth daily. atorvastatin 10 mg tablet Commonly known as:  LIPITOR Take 1 tablet by mouth  daily  
  
 calcium-cholecalciferol (D3) tablet Commonly known as:  CALTRATE 600+D Take 2 Tabs by mouth daily. co-enzyme Q-10 100 mg capsule Commonly known as:  CO Q-10 Take 200 mg by mouth daily. escitalopram oxalate 10 mg tablet Commonly known as:  Claire Bors TAKE 1 TABLET BY MOUTH  DAILY Glucosamine &Chondroit-MV-Min3 149-111-59-0.5 mg Tab Take 2 Tabs by mouth daily. latanoprost 0.005 % ophthalmic solution Commonly known as:  XALATAN  
INSTILL 1 DROP IN BOTH EYES EVERY NIGHT AT BEDTIME  
  
 losartan 50 mg tablet Commonly known as:  COZAAR Take 1/2 tab daily--Dose change 4/27/17--updated med list--did not send prescription to the pharmacy MIRALAX PO Take  by mouth. 1-2  tsp daily OMEGA 3 FISH OIL PO Take 1 Cap by mouth two (2) times a day. SYNTHROID 75 mcg tablet Generic drug:  levothyroxine TAKE 1 TABLET BY MOUTH ON  MONDAY THROUGH SATURDAY AND NONE ON SUNDAY AT BEDTIME  OR 30 MINUTES BEFORE COFFEE IN THE MORNING  
  
 traMADol 50 mg tablet Commonly known as:  ULTRAM  
Take 1 Tab by mouth every six (6) hours as needed for Pain. Max Daily Amount: 200 mg. Indications: Pain  
  
 vit B Cmplx 3-FA-Vit C-Biotin 1- mg-mg-mcg tablet Commonly known as:  NEPHRO BANDAR RX Take 1 Tab by mouth daily. We Performed the Following LIPID PANEL [61921 CPT(R)] T4, FREE [id number] CPT(R)] TSH 3RD GENERATION [90592 CPT(R)] Follow-up Instructions Return in about 6 months (around 11/3/2018) for HTN. Patient Instructions Medicare Wellness Visit, Female The best way to live healthy is to have a healthy lifestyle by eating a well-balanced diet, exercising regularly, limiting alcohol and stopping smoking. Regular physical exams and screening tests are another way to keep healthy. Preventive exams provided by your health care provider can find health problems before they become diseases or illnesses. Preventive services including immunizations, screening tests, monitoring and exams can help you take care of your own health. All people over age 72 should have a pneumovax  and and a prevnar shot to prevent pneumonia. These are once in a lifetime unless you and your provider decide differently. All people over 65 should have a yearly flu shot and a tetanus vaccine every 10 years. A bone mass density to screen for osteoporosis or thinning of the bones should be done every 2 years after 65. Screening for diabetes mellitus with a blood sugar test should be done every year. Glaucoma is a disease of the eye due to increased ocular pressure that can lead to blindness and it should be done every year by an eye professional. 
 
Cardiovascular screening tests that check for elevated lipids (fatty part of blood) which can lead to heart disease and strokes should be done every 5 years. Colorectal screening that evaluates for blood or polyps in your colon should be done yearly as a stool test or every five years as a flexible sigmoidoscope or every 10 years as a colonoscopy up to age 76. Breast cancer screening with a mammogram is recommended biennially  for women age 54-69. Screening for cervical cancer with a pap smear and pelvic exam is recommended for women after age 72 years every 2 years up to age 79 or when the provider and patient decide to stop. If there is a history of cervical abnormalities or other increased risk for cancer then the test is recommended yearly. Hepatitis C screening is also recommended for anyone born between 80 through [address]. A shingles vaccine is also recommended once in a lifetime after age 61. Your Medicare Wellness Exam is recommended annually. Here is a list of your current Health Maintenance items with a due date: 
Health Maintenance Due Topic Date Due  Glaucoma Screening   07/29/2016 [address] [place] [address] Annual Well Visit  04/28/2018 Introducing [facility]! Dear Efe Carter: 
Thank you for requesting a [employer] account.   Our records indicate that you already have an active [employer] account. You can access your account anytime at [email or web address] Did you know that you can access your hospital and ER discharge instructions at any time in [employer]? You can also review all of your test results from your hospital stay or ER visit. Additional Information If you have questions, please visit the Frequently Asked Questions section of the [employer] website at [email or web address] Remember, [employer] is NOT to be used for urgent needs. For medical emergencies, dial 911. Now available from your iPhone and Android! Please provide this summary of care documentation to your next provider. Your primary care clinician is listed as [place]. If you have any questions after today's visit, please call [phone].

## 2018-05-03 NOTE — PROGRESS NOTES
SUBJECTIVE:   Ms. Mello Sepulveda presents today for follow up. Chief Complaint   Patient presents with    Hypertension     pt here today for 6 month f.u    Leg Pain     pt c.o bilateral leg pain- pt not sure when she fell         She passed out 6 weeks ago, and broke her foot. \"They upped my pacemaker. \"   We noted previously: \"I think that by mistake they gave her the high dose losartan with HCTZ in it. \" However, had another fainting spell last week. Seeing Dr. Shiela Greene and Dr. Carlos Murphy to follow up with the pacemaker. Neuropathy: She continues to have ongoing foot pain, burning and stinging. She saw Dr. Jennifer Pham with [facility] foot clinic in the past, and was started on lyrica. This helped at first but then \"the second bottle didn't seem to do anything, so I stopped it. \" Gabapentin not tolerated at higher doses than 100 mg. Insomnia: \"Mostly I sleep okay. \" She still has trouble with sleep onset some nights; also sleep maintenance. Some nights she is okay; other nights she pops awake sometimes at about 1 AM, and then awakens every hour. She denies that the pain is waking her up. \"I don't know what it is. I'm not worried about anything. \"   R knee pain still present. \"I live with that. \"  She has seen Dr. Farhad Davis, not recently. She has had three injections. Putting off TKR for now. Knee pain is noted when going up stairs or when arising from chair. Has right SI pain, which is chronic. Anxiety and depression may be a bit better. She denies any problem at this time. It is recalled that she had neuropsych testing which revealed dementia, plus some overlay of depression. Stable. Never saw Dr. Isha Prasad. Tried aricept and felt it gave her bad dreams. For all issues, see A and P. PAST MEDICAL HISTORY: She has a past medical history of Hypertension (2/16/2011); Back pain (2/16/2011); Epigastric pain (2/16/2011); Hyperlipidemia (2/16/2011); Neuropathy (2/16/2011);  Hypothyroidism (2/16/2011); Urinary frequency (2/16/2011); and Osteoporosis (2/16/2011). Conjunctivitis, refractory (2011, seeing Dr. Abhinav Mejias)  PAST SURGICAL HISTORY: Partial resection of colon due to ca, tonsillectomy 1950. Last colonoscopy by Dr. Alf Alva was a few yr ago. ALLERGIES: Amoxicillin; Aricept [donepezil]; Augmentin [amoxicillin-pot clavulanate]; Pcn [penicillins]; and Zithromax [azithromycin]  MEDICATIONS:  Current Outpatient Prescriptions on File Prior to Visit   Medication Sig Dispense Refill    SYNTHROID 75 mcg tablet TAKE 1 TABLET BY MOUTH ON  MONDAY THROUGH SATURDAY AND NONE ON SUNDAY AT BEDTIME  OR 30 MINUTES BEFORE COFFEE IN THE MORNING 78 Tab 3    escitalopram oxalate (LEXAPRO) 10 mg tablet TAKE 1 TABLET BY MOUTH  DAILY 90 Tab 3    traMADol (ULTRAM) 50 mg tablet Take 1 Tab by mouth every six (6) hours as needed for Pain. Max Daily Amount: 200 mg. Indications: Pain 10 Tab 0    alendronate (FOSAMAX) 70 mg tablet TAKE 1 TABLET BY MOUTH  EVERY SUNDAY 12 Tab 3    atorvastatin (LIPITOR) 10 mg tablet Take 1 tablet by mouth  daily 90 Tab 3    gabapentin (NEURONTIN) 300 mg capsule Two Capsules in the morning and One Capsule in the Evening      losartan (COZAAR) 50 mg tablet Take 1/2 tab daily--Dose change 4/27/17--updated med list--did not send prescription to the pharmacy 90 Tab 3    latanoprost (XALATAN) 0.005 % ophthalmic solution INSTILL 1 DROP IN BOTH EYES EVERY NIGHT AT BEDTIME  12    co-enzyme Q-10 (CO Q-10) 100 mg capsule Take 200 mg by mouth daily.  Glucosamine &Chondroit-MV-Min3 802-801-33-0.5 mg tab Take 2 Tabs by mouth daily.  calcium-cholecalciferol, D3, (CALTRATE 600+D) tablet Take 2 Tabs by mouth daily.  vit B Cmplx 3-FA-Vit C-Biotin (NEPHRO BANDAR RX) 1- mg-mg-mcg tablet Take 1 Tab by mouth daily.  OMEGA-3 FATTY ACIDS/FISH OIL (OMEGA 3 FISH OIL PO) Take 1 Cap by mouth two (2) times a day.  POLYETHYLENE GLYCOL 3350 (MIRALAX PO) Take  by mouth.  1-2   tsp daily      aspirin delayed-release 81 mg tablet Take 81 mg by mouth daily. No current facility-administered medications on file prior to visit. FAMILY HISTORY: Her father  at age 61 of MI, and also had HTN. Her family history also includes diabetes in her brother, mother, and sister. SOCIAL HISTORY: She is . Retired. She reports that she has never smoked. She has never used smokeless tobacco. She reports that she does not drink alcohol or use illicit drugs. REVIEW OF SYSTEMS: See above; Complete ROS otherwise negative. In addition, complains of \"feet and hands always feel cold, even during warm weather,\" brain feels like it is in a \"fog\", muscles ache, skin is dry and flaky. OBJECTIVE:   Visit Vitals    /82 (BP 1 Location: Left arm, BP Patient Position: Sitting)    Pulse 75    Temp 97.8 °F (36.6 °C) (Oral)    Resp 18    Ht 5' 5\" (1.651 m)    Wt 166 lb (75.3 kg)    SpO2 99%    BMI 27.62 kg/m2     Gen: Pleasant 80 y.o. W female in NAD. HEENT: moist pink. No erythema or exudates. HEART: RRR, No M/G/R.  LUNGS: CTAB No W/R. Effort is unlabored. ABDOMEN: S, NT, ND, BS+. EXTREMITIES: Warm. No C/C/E. Lab Results   Component Value Date/Time    WBC 7.6 2018 10:18 PM    HGB 13.1 2018 10:18 PM    HCT 39.5 2018 10:18 PM    PLATELET 494 25/15/1677 10:18 PM    MCV 93.2 2018 10:18 PM     Lab Results   Component Value Date/Time    Sodium 137 2018 10:18 PM    Potassium 4.2 2018 10:18 PM    Chloride 104 2018 10:18 PM    CO2 29 2018 10:18 PM    Anion gap 4 (L) 2018 10:18 PM    Glucose 106 (H) 2018 10:18 PM    BUN 16 2018 10:18 PM    Creatinine 0.92 2018 10:18 PM    BUN/Creatinine ratio 17 2018 10:18 PM    GFR est AA >60 2018 10:18 PM    GFR est non-AA 58 (L) 2018 10:18 PM    Calcium 9.0 2018 10:18 PM       ASSESSMENT/ PLAN:     1. Syncope: Seeing Cardiolgoy; pacemaker adjusted.    2. Hypertension: Given syncope, fine today; In the past we've noted:  we'll need to probably let this run high. 3. Neuropathy: Off gabapentin. Progressively worsening over time--relatively stable right now. Previously referred to Neurology. As noted 2016 visit: We will put her on a trial of amitriptyline--. She was on cymbalta for a time (stopped when lyrica was begun); more recently lyrica. She had some success initially on this, and I note that her dose was rather small. She could try this again in the future, but for now she is leery to do so due to cost of the med. 4. Insomnia: Ongoing. Mainly problem with sleep maintenance. We'll try prn zolpidem. 5. Epigastric pain: Better. On Ranitidine. 6. Dementia: Stable. She is s/p neuropsych testing with Dr. Renetta Murphy. Apparently didn't tolerate aricept. Also off namenda--not sure why. Referred previously to neurology. 7. Depression:  Better, per patient. Denies current depression. 8. Hypothyroidism: Follow up with endocrinologist Dr. Luis Posada. 9. 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 Stable. 10. Abnormal LFT: prior work up with ultrasound and labs unrevealing. Monitor labs. 11. Hyperlipidemia: Continue fish oil; Off statin right now. 12. Urinary frequency: Stable on ditropan. 13. Osteoporosis: Continue fosamax and Vit D +Ca. Medications ordered as noted above and risks/side effects/benefits discussed. Diagnosis(es) and condition(s) explained to patient and questions answered. Literature provided as appropriate. Follow-up Disposition:  Return in about 6 months (around 11/3/2018) for HTN.

## 2018-05-03 NOTE — PROGRESS NOTES
1. Have you been to the ER, urgent care clinic since your last visit? Hospitalized since your last visit?no  2. Have you seen or consulted any other health care providers outside of the [address] [place] [address] since your last visit? Include any pap smears or colon screening.  no

## 2018-05-03 NOTE — PROGRESS NOTES
Wellness: This is the Subsequent Medicare Annual Wellness Exam, performed 12 months or more after the Initial AWV or the last Subsequent AWV    I have reviewed the patient's medical history in detail and updated the computerized patient record. History     Past Medical History:   Diagnosis Date    Acquired hypothyroidism 10/30/2017    Arthritis     Back pain 2/16/2011    Cancer ([address] 1993    colon    Chronic pain     in her back    Dementia 2011    Mild; Neuropsych testing with Dr. Antonia Sanderson in 2011    Dementia 7/21/2011    Epigastric pain 2/16/2011    Essential hypertension     Essential hypertension 12/4/2015    Hyperlipidemia 2/16/2011    Hypertension 2/16/2011    Hypothyroidism 2/16/2011    s/p VALDES in 1998 and 1999 for hyperthyroidism    Neuropathy 2/16/2011    Orthostasis 7/6/2012    Orthostasis 7/6/2012    Osteoporosis 2/16/2011    Pacemaker     Persistent disorder of initiating or maintaining sleep 4/22/2015    S/P cardiac pacemaker procedure 8/24/2011    SSS (sick sinus syndrome) ([address] 8/20/2011    Syncope     Syncope and collapse 8/19/2011    Urinary frequency 2/16/2011      Past Surgical History:   Procedure Laterality Date    HX GI  1993    colon resection    HX HEENT      tonsillectomy in 1950    HX PACEMAKER      CA ENTEROSCOPY > 2ND PRTN ILEUM CONTROL BLEEDING  6/23/2011          Current Outpatient Prescriptions   Medication Sig Dispense Refill    SYNTHROID 75 mcg tablet TAKE 1 TABLET BY MOUTH ON  MONDAY THROUGH SATURDAY AND NONE ON SUNDAY AT BEDTIME  OR 30 MINUTES BEFORE COFFEE IN THE MORNING 78 Tab 3    escitalopram oxalate (LEXAPRO) 10 mg tablet TAKE 1 TABLET BY MOUTH  DAILY 90 Tab 3    traMADol (ULTRAM) 50 mg tablet Take 1 Tab by mouth every six (6) hours as needed for Pain. Max Daily Amount: 200 mg.  Indications: Pain 10 Tab 0    alendronate (FOSAMAX) 70 mg tablet TAKE 1 TABLET BY MOUTH  EVERY SUNDAY 12 Tab 3    atorvastatin (LIPITOR) 10 mg tablet Take 1 tablet by mouth  daily 90 Tab 3    losartan (COZAAR) 50 mg tablet Take 1/2 tab daily--Dose change 4/27/17--updated med list--did not send prescription to the pharmacy 90 Tab 3    latanoprost (XALATAN) 0.005 % ophthalmic solution INSTILL 1 DROP IN BOTH EYES EVERY NIGHT AT BEDTIME  12    co-enzyme Q-10 (CO Q-10) 100 mg capsule Take 200 mg by mouth daily.  Glucosamine &Chondroit-MV-Min3 531-154-04-0.5 mg tab Take 2 Tabs by mouth daily.  calcium-cholecalciferol, D3, (CALTRATE 600+D) tablet Take 2 Tabs by mouth daily.  vit B Cmplx 3-FA-Vit C-Biotin (NEPHRO BANDAR RX) 1- mg-mg-mcg tablet Take 1 Tab by mouth daily.  OMEGA-3 FATTY ACIDS/FISH OIL (OMEGA 3 FISH OIL PO) Take 1 Cap by mouth two (2) times a day.  POLYETHYLENE GLYCOL 3350 (MIRALAX PO) Take  by mouth. 1-2   tsp daily      aspirin delayed-release 81 mg tablet Take 81 mg by mouth daily. Allergies   Allergen Reactions    Amoxicillin Unknown (comments)    Aricept [Donepezil] Other (comments)     Bad dreams.     Augmentin [Amoxicillin-Pot Clavulanate] Unknown (comments)    Pcn [Penicillins] Rash    Zithromax [Azithromycin] Unknown (comments)     Family History   Problem Relation Age of Onset    Diabetes Mother     Thyroid Disease Mother     Heart Disease Mother     Hypertension Father     Heart Disease Father     Diabetes Sister     Diabetes Brother      Social History   Substance Use Topics    Smoking status: Never Smoker    Smokeless tobacco: Never Used    Alcohol use No     Patient Active Problem List   Diagnosis Code    Epigastric pain R10.13    Hyperlipidemia E78.5    Neuropathy G62.9    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    Urinary frequency R35.0    Osteoporosis M81.0    Personal history of colon cancer Z85.038    Depression F32.9    Dementia F03.90    SSS (sick sinus syndrome) (HCC) I49.5    S/P cardiac pacemaker procedure Z95.0    DJD (degenerative joint disease) of knee M17.10    Essential hypertension, benign I10    Mobitz (type) II atrioventricular block I44.1    Cardiac pacemaker in situ Z95.0    Orthostasis I95.1    Vasovagal syndrome R55    Syncope due to orthostatic hypotension I95.1    Anxiety disorder F41.9    Persistent disorder of initiating or maintaining sleep G47.00    Essential hypertension I10    Syncope R55    Hypokalemia E87.6    Stenosis of both internal carotid arteries I65.23    Acquired hypothyroidism E03.9       Depression Risk Factor Screening:     PHQ over the last two weeks 4/27/2017   PHQ Not Done -   Little interest or pleasure in doing things Not at all   Feeling down, depressed or hopeless Not at all   Total Score PHQ 2 0     Alcohol Risk Factor Screening: You do not drink alcohol or very rarely. Functional Ability and Level of Safety:   Hearing Loss  The patient wears hearing aids. Activities of Daily Living  Lives in house with . Daughter is across the road. Also has a home health aid during the day. The home contains: She ambulates with cane. Patient needs help with:  transportation, shopping, preparing meals, housework, managing medications, managing money, bathing and hygiene    Fall Risk  Fall Risk Assessment, last 12 mths 3/12/2018   Able to walk? Yes   Fall in past 12 months? Yes   Fall with injury?  Yes   Number of falls in past 12 months 3   Fall Risk Score 4       Abuse Screen  Patient is not abused    Cognitive Screening   Evaluation of Cognitive Function:  Has your family/caregiver stated any concerns about your memory: yes  Abnormal--getting worse    Patient Care Team   Patient Care Team:  Timur Peters MD as PCP - General (Internal Medicine)  Anabelle Frederick MD (Cardiology)  Dariela Killian MD as Consulting Provider (Endocrinology)  Mykel Judge MD (Cardiology)  Saul Galloway MD (Gastroenterology)  Ace Smiley MD (Ophthalmology)    Assessment/Plan   Education and counseling provided:  Are appropriate based on today's review and evaluation    Diagnoses and all orders for this visit:    1. Syncope, unspecified syncope type    2. Acquired hypothyroidism  -     TSH 3RD GENERATION  -     T4, FREE    3. Mixed hyperlipidemia  -     LIPID PANEL    4. Essential hypertension    5. Generalized anxiety disorder    6. Neuropathy    7.  Medicare annual wellness visit, subsequent        Health Maintenance Due   Topic Date Due    GLAUCOMA SCREENING Q2Y  07/29/2016    MEDICARE YEARLY EXAM  04/28/2018

## 2018-05-17 NOTE — MR AVS SNAPSHOT
[address] [id number] [phone] Patient: Rick Barrios MRN: [id number] :3/6/1932 Visit Information Date & Time Provider Department Dept. Phone Encounter #  
 2018  9:50 AM Roxanna Rey, [address] [facility] End[facility]ology [phone] [id number] Follow-up Instructions Return in about 6 months (around 2018). Your Appointments 2018  1:00 PM  
ESTABLISHED PATIENT with Jillian Cannon MD  
[facility] Cardiology Associate [address] [facility] [address]) Appt Note: 6 MONTH FOLLOW UP PER DR. HOU 18 [place]  
 [address] [place] [address]  
[phone]  
  
   
 [address] [place] [address] [address] [facility] [address]  
  
    
 2018 10:30 AM  
ROUTINE CARE with Tiffanie Persaud MD  
[facility] [address] [facility]) Appt Note: 6 mon f/u  
 [address] [facility] [address] [id number]  
[address] [facility] [address] [place] [address] Upcoming Health Maintenance Date Due  
 GLAUCOMA SCREENING Q2Y 2016 Influenza Age 5 to Adult 2018 MEDICARE YEARLY EXAM 2019 DTaP/Tdap/Td series (2 - Td) 10/22/2025 Allergies as of 2018  Review Complete On: 2018 By: Roxanna Rey MD  
  
 Severity Noted Reaction Type Reactions Amoxicillin  2011    Unknown (comments) Aricept [Donepezil]  2012    Other (comments) Bad dreams. Augmentin [Amoxicillin-pot Clavulanate]  2011    Unknown (comments) Pcn [Penicillins]  2016    Rash Zithromax [Azithromycin]  2011    Unknown (comments) Current Immunizations  Reviewed on 10/22/2015 Name Date Influenza High Dose Vaccine PF 10/30/2017 11:45 AM  
 Influenza Vaccine 10/22/2014 10:31 AM, 10/21/2013 Influenza Vaccine Mickey Bia) 10/22/2015 Influenza Vaccine (Quad) PF 10/27/2016 Influenza Vaccine Split 10/9/2012, 10/3/2011 Influenza Vaccine Whole 10/1/2010 Pneumococcal Conjugate (PCV-13) 11/17/2015 Tdap 10/22/2015 ZZZ-RETIRED (DO NOT USE) Pneumococcal Vaccine (Unspecified Type) 10/1/2010 Not reviewed this visit You Were Diagnosed With   
  
 Codes Comments Postablative hypothyroidism    -  Primary ICD-10-CM: E89.0 ICD-9-CM: 244.1 Vitals BP Pulse Height(growth percentile) Weight(growth percentile) BMI OB Status 108/58 76 5' 5\" (1.651 m) 161 lb 3.2 oz (73.1 kg) 26.83 kg/m2 Postmenopausal  
 Smoking Status Never Smoker Vitals History BMI and BSA Data Body Mass Index Body Surface Area  
 26.83 kg/m 2 1.83 m 2 Preferred Pharmacy Pharmacy Name Phone [address] [facility], [address] [facility] [address] Viridiana Ramos 134 Your Updated Medication List  
  
   
This list is accurate as of 5/17/18 10:53 AM.  Always use your most recent med list.  
  
  
  
  
 alendronate 70 mg tablet Commonly known as:  FOSAMAX TAKE 1 TABLET BY MOUTH  EVERY SUNDAY  
  
 aspirin delayed-release 81 mg tablet Take 81 mg by mouth daily. atorvastatin 10 mg tablet Commonly known as:  LIPITOR Take 1 tablet by mouth  daily  
  
 calcium-cholecalciferol (D3) tablet Commonly known as:  CALTRATE 600+D Take 2 Tabs by mouth daily. co-enzyme Q-10 100 mg capsule Commonly known as:  CO Q-10 Take 200 mg by mouth daily. escitalopram oxalate 10 mg tablet Commonly known as:  Colen Brunner TAKE 1 TABLET BY MOUTH  DAILY  
  
 gabapentin 300 mg capsule Commonly known as:  NEURONTIN Take 2 caps twice daily Glucosamine &Chondroit-MV-Min3 399-924-64-0.5 mg Tab Take 2 Tabs by mouth daily. MIRALAX PO Take  by mouth. 1-2  tsp daily OMEGA 3 FISH OIL PO Take 1 Cap by mouth two (2) times a day. SYNTHROID 75 mcg tablet Generic drug:  levothyroxine TAKE 1 TABLET BY MOUTH ON  MONDAY THROUGH SATURDAY AND NONE ON SUNDAY AT BEDTIME  OR 30 MINUTES BEFORE COFFEE IN THE MORNING  
  
 traMADol 50 mg tablet Commonly known as:  ULTRAM  
Take 1 Tab by mouth every six (6) hours as needed for Pain. Max Daily Amount: 200 mg. Indications: Pain  
  
 vit B Cmplx 3-FA-Vit C-Biotin 1- mg-mg-mcg tablet Commonly known as:  NEPHRO BANDAR RX Take 1 Tab by mouth daily. We Performed the Following COLLECTION VENOUS BLOOD,VENIPUNCTURE [id number] CPT(R)] VT HANDLG&/OR CONVEY OF SPEC FOR TR OFFICE TO LAB [11840 CPT(R)] T4, FREE [id number] CPT(R)] TSH 3RD GENERATION [76044 CPT(R)] Follow-up Instructions Return in about 6 months (around 11/17/2018). Patient Instructions 1) I will repeat your thyroid tests to see if you need a change in your dose of synthroid and I'll contact your daughter through the portal. 
 
 
 
  
Introducing [facility]! Dear Efe Carter: 
Thank you for requesting a [employer] account. Our records indicate that you already have an active [employer] account. You can access your account anytime at [email or web address] Did you know that you can access your hospital and ER discharge instructions at any time in [employer]? You can also review all of your test results from your hospital stay or ER visit. Additional Information If you have questions, please visit the Frequently Asked Questions section of the [employer] website at [email or web address] Remember, [employer] is NOT to be used for urgent needs. For medical emergencies, dial 911. Now available from your iPhone and Android! Please provide this summary of care documentation to your next provider. Your primary care clinician is listed as [place]. If you have any questions after today's visit, please call [phone].

## 2018-05-17 NOTE — PROGRESS NOTES
Chief Complaint   Patient presents with    Thyroid Problem     pcp and pharmacy confirmed     History of Present Illness: Maricarmen Baig is a 80 y.o. female here for follow up of thyroid. Weight up 2 lbs since last visit in 11/17. She was running late today as her aide was late getting her to the visit. Her daughter is still filling her pill box and she has been getting 6 tabs/week and skipping Sunday as far as she knows. Bowels are regular. No hair loss or brittle nails. Some dry skin but unchanged. Stays cold but unchanged. Her losartan was stopped by Dr. Kady Dill on 4/30/18 as she was having syncopal spells and none over the past 2 weeks since being off this med. Current Outpatient Prescriptions   Medication Sig    gabapentin (NEURONTIN) 300 mg capsule Take 2 caps twice daily    SYNTHROID 75 mcg tablet TAKE 1 TABLET BY MOUTH ON  MONDAY THROUGH SATURDAY AND NONE ON SUNDAY AT BEDTIME  OR 30 MINUTES BEFORE COFFEE IN THE MORNING    escitalopram oxalate (LEXAPRO) 10 mg tablet TAKE 1 TABLET BY MOUTH  DAILY    traMADol (ULTRAM) 50 mg tablet Take 1 Tab by mouth every six (6) hours as needed for Pain. Max Daily Amount: 200 mg. Indications: Pain    alendronate (FOSAMAX) 70 mg tablet TAKE 1 TABLET BY MOUTH  EVERY SUNDAY    atorvastatin (LIPITOR) 10 mg tablet Take 1 tablet by mouth  daily    co-enzyme Q-10 (CO Q-10) 100 mg capsule Take 200 mg by mouth daily.  Glucosamine &Chondroit-MV-Min3 721-004-60-0.5 mg tab Take 2 Tabs by mouth daily.  calcium-cholecalciferol, D3, (CALTRATE 600+D) tablet Take 2 Tabs by mouth daily.  vit B Cmplx 3-FA-Vit C-Biotin (NEPHRO BANDAR RX) 1- mg-mg-mcg tablet Take 1 Tab by mouth daily.  OMEGA-3 FATTY ACIDS/FISH OIL (OMEGA 3 FISH OIL PO) Take 1 Cap by mouth two (2) times a day.  POLYETHYLENE GLYCOL 3350 (MIRALAX PO) Take  by mouth. 1-2   tsp daily    aspirin delayed-release 81 mg tablet Take 81 mg by mouth daily.      No current facility-administered medications for this visit. Allergies   Allergen Reactions    Amoxicillin Unknown (comments)    Aricept [Donepezil] Other (comments)     Bad dreams.  Augmentin [Amoxicillin-Pot Clavulanate] Unknown (comments)    Pcn [Penicillins] Rash    Zithromax [Azithromycin] Unknown (comments)     Review of Systems:  - Cardiovascular: no chest pain  - Neurological: no tremors  - Integumentary: skin is normal    Physical Examination:  Blood pressure 108/58, pulse 76, height 5' 5\" (1.651 m), weight 161 lb 3.2 oz (73.1 kg). - General: pleasant, no distress, good eye contact   - Neck: small goiter without nodules  - Cardiovascular: regular, normal rate, nl s1 and s2, no m/r/g   - Respiratory: clear to auscultation bilaterally   - Integumentary: skin is normal, no edema  - Neurological: reflexes 2+ at biceps, no tremors  - Psychiatric: normal mood and affect    Data Reviewed:   - none new for review    Assessment/Plan:     1. 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 Her TSH was 1.24 in 8/11 on 75 mcg of Levoxyl daily and I increased her dose to 88 mcg daily and was able to push her TSH to 0.651 in January 2012. In July her level was normal at 2.45 so I kept her dose the same. In Nov 2012, her TSH was low at 0.306 and she was having some chest pain so I decreased her dose to an equivalent of 81 mcg/day by taking 1/2 tab on Sun only. TSH was up to 0.321 in 2/13 but still low so I decreased her back to 75 mcg daily and TSH is normal at 0.86 in 6/13 and she has no more palpitations. We had to switch to Synthroid as Levoxyl was on national backorder and TSH still normal at 1.92 in 9/13 and 1.37 in 3/14 and 2.07 in 7/14 and 1.86 in 4/15 and 1.19 in 10/15.   Up to 2.95 in 7/16 but had been taking with calcium so moved this away to improve absorption and this has helped with energy but still taking within 30 min of coffee and TSH 0.336 in 5/17 so decreased to 75 mcg 6 tabs/week and moved pill to bedtime and TSH 1.4 in 11/17 so kept her dose the same. - cont Synthroid 75 mcg 1 tab on Mon-Sat and none on Sunday at bedtime until labs are back  - check TSH and free T4 today and at next visit       Patient Instructions   1) I will repeat your thyroid tests to see if you need a change in your dose of synthroid and I'll contact your daughter through the portal.      Follow-up Disposition:  Return in about 6 months (around 11/17/2018). Copy sent to:  Dr. Anuj Bond via [facility]  Dr. Neetu Hargrove via [facility]  Dr. Sakshi Osuna via [facility]  Dr. Joshua Little at [address] [facility]    Lab follow up: 5/18/18  Component      Latest Ref Rng & Units 5/17/2018 5/17/2018          11:07 AM 11:07 AM   T4, Free      0.82 - 1.77 ng/dL  0.80 (L)   TSH      0.450 - 4.500 uIU/mL 3.530      Sent her daughter the following message through [employer]:  TSH is a thyroid test.  Your level is 3.53 which is normal and at goal of 0.5-4.0. This test goes opposite of your thyroid dose and suggests your dose of synthroid is likely adequate but your free T4 was slightly low so possibly your dose isn't quite enough. Can you confirm your mother is still taking 1 tab on Monday-Saturday and none on Sunday? Has she missed any doses or run out over the past 6 weeks? Let me know when you have a chance so I can determine if I should slightly increase your Synthroid. Addendum: 5/19/18    We had the following e-mail exchange:    Derrick Marmolejo. Then let's have her take 1 tab on Mon-Sat and 1/2 tab on Sunday instead of skipping her dose on Sunday to give her a little boost.  I updated this prescription at the mail order pharmacy.  ===View-only below this line===      ----- Message -----     From: Susan Marlow.  Maggie Antunez     Sent: 5/19/2018 11:07 AM EDT       To: Brian Velazquez MD  Subject: RE:lab results    Yes, she is taking the dosage each evening along with her escitalopram. Mon - Sat (none on Sunday). During last 6 weeks she has missed evening dosage twice. Hasnt missed it in last 2 weeks.

## 2018-05-17 NOTE — PATIENT INSTRUCTIONS
1) I will repeat your thyroid tests to see if you need a change in your dose of synthroid and I'll contact your daughter through the portal.

## 2018-05-19 PROBLEM — R55 SYNCOPE AND COLLAPSE: Status: ACTIVE | Noted: 2018-01-01

## 2018-05-19 NOTE — ED PROVIDER NOTES
EMERGENCY DEPARTMENT HISTORY AND PHYSICAL EXAM      Date: 5/19/2018  Patient Name: Pat Diaz    History of Presenting Illness     Chief Complaint   Patient presents with    Syncope       History Provided By: Patient and Patient's Daughter    HPI: Pat iDaz, 80 y.o. female with PMHx significant for HTN, syncope s/p pacemaker, arthritis, dementia, presents via EMS to the ED with cc of syncope, back pain, and leg pain. Daughter reports that around noon today she had a syncopal episode on her porch witnessed by her caretaker. The caretaker was able to catch her before she hit her head. Afterwards she was confused but now is back to baseline and was able to eat lunch afterward. Since the episode, she has had worsening lumbar spine pain radiating to her left back and down her R leg. It is 10/10. She took 2 tylenol afterward which helped only a little. She also has more pain in her R lower leg where she fractured it recently. The daughter reports that her cardiologist has been increasing her pacemaker rate due to multiple recent syncopal episodes. She denies weakness, urinary or stool incontinence or retention, fevers, dysuria, headache, CP, sob. She takes baby aspirin daily but is not on any other blood thinners. There are no other complaints, changes, or physical findings at this time.     PCP: Durene Homans, MD    Current Facility-Administered Medications   Medication Dose Route Frequency Provider Last Rate Last Dose    lidocaine (LIDODERM) 5 % patch 1 Patch  1 Patch TransDERmal ONCE Jaxon Olmstead MD   1 Patch at 05/19/18 1079     Current Outpatient Prescriptions   Medication Sig Dispense Refill    SYNTHROID 75 mcg tablet TAKE 1 TABLET BY MOUTH ON  MONDAY THROUGH SATURDAY AND 1/2 TAB ON SUNDAY AT BEDTIME--BRAND MEDICALLY NECESSARY 85 Tab 3    gabapentin (NEURONTIN) 300 mg capsule Take 2 caps in the morning and 1 cap at night      escitalopram oxalate (LEXAPRO) 10 mg tablet TAKE 1 TABLET BY MOUTH  DAILY 90 Tab 3    traMADol (ULTRAM) 50 mg tablet Take 1 Tab by mouth every six (6) hours as needed for Pain. Max Daily Amount: 200 mg. Indications: Pain 10 Tab 0    alendronate (FOSAMAX) 70 mg tablet TAKE 1 TABLET BY MOUTH  EVERY SUNDAY 12 Tab 3    atorvastatin (LIPITOR) 10 mg tablet Take 1 tablet by mouth  daily 90 Tab 3    co-enzyme Q-10 (CO Q-10) 100 mg capsule Take 200 mg by mouth daily.  Glucosamine &Chondroit-MV-Min3 067-654-31-0.5 mg tab Take 2 Tabs by mouth daily.  calcium-cholecalciferol, D3, (CALTRATE 600+D) tablet Take 2 Tabs by mouth daily.  vit B Cmplx 3-FA-Vit C-Biotin (NEPHRO BANDAR RX) 1- mg-mg-mcg tablet Take 1 Tab by mouth daily.  OMEGA-3 FATTY ACIDS/FISH OIL (OMEGA 3 FISH OIL PO) Take 1 Cap by mouth two (2) times a day.  POLYETHYLENE GLYCOL 3350 (MIRALAX PO) Take  by mouth. 1-2   tsp daily      aspirin delayed-release 81 mg tablet Take 81 mg by mouth daily.          Past History     Past Medical History:  Past Medical History:   Diagnosis Date    Acquired hypothyroidism 10/30/2017    Arthritis     Back pain 2/16/2011    Cancer ([address] 1993    colon    Chronic pain     in her back    Dementia 2011    Mild; Neuropsych testing with Dr. Yury Ruiz in 2011    Dementia 7/21/2011    Epigastric pain 2/16/2011    Essential hypertension     Essential hypertension 12/4/2015    Hyperlipidemia 2/16/2011    Hypertension 2/16/2011    Hypothyroidism 2/16/2011    s/p VALDES in 1998 and 1999 for hyperthyroidism    Neuropathy 2/16/2011    Orthostasis 7/6/2012    Orthostasis 7/6/2012    Osteoporosis 2/16/2011    Pacemaker     Persistent disorder of initiating or maintaining sleep 4/22/2015    S/P cardiac pacemaker procedure 8/24/2011    SSS (sick sinus syndrome) ([facility] [address] 8/20/2011    Syncope     Syncope and collapse 8/19/2011    Urinary frequency 2/16/2011       Past Surgical History:  Past Surgical History:   Procedure Laterality Date    HX GI  1993    colon resection    HX HEENT      tonsillectomy in 1950    HX PACEMAKER      OR ENTEROSCOPY > 2ND PRTN ILEUM CONTROL BLEEDING  6/23/2011            Family History:  Family History   Problem Relation Age of Onset    Diabetes Mother     Thyroid Disease Mother     Heart Disease Mother     Hypertension Father     Heart Disease Father     Diabetes Sister     Diabetes Brother        Social History:  Social History   Substance Use Topics    Smoking status: Never Smoker    Smokeless tobacco: Never Used    Alcohol use No       Allergies: Allergies   Allergen Reactions    Amoxicillin Unknown (comments)    Aricept [Donepezil] Other (comments)     Bad dreams.  Augmentin [Amoxicillin-Pot Clavulanate] Unknown (comments)    Pcn [Penicillins] Rash    Zithromax [Azithromycin] Unknown (comments)         Review of Systems   Review of Systems   Constitutional: Negative. Negative for chills, fatigue and fever. HENT: Negative. Negative for congestion, facial swelling, rhinorrhea, sore throat, trouble swallowing and voice change. Eyes: Negative. Negative for visual disturbance. Respiratory: Positive for cough. Negative for apnea, chest tightness, shortness of breath and wheezing. Cardiovascular: Negative. Negative for chest pain, palpitations and leg swelling. Gastrointestinal: Negative. Negative for abdominal distention, abdominal pain, blood in stool, constipation, diarrhea, nausea and vomiting. No melena  No hematochezia   Endocrine: Negative. Negative for cold intolerance, heat intolerance and polyuria. Genitourinary: Negative. Negative for decreased urine volume, difficulty urinating, dysuria, flank pain, frequency, hematuria and urgency. Musculoskeletal: Positive for back pain and myalgias. Negative for arthralgias, neck pain and neck stiffness. Skin: Negative. Negative for color change and rash. Neurological: Positive for syncope.  Negative for dizziness, facial asymmetry, speech difficulty, weakness, light-headedness, numbness and headaches. No tingling   Hematological: Negative. Does not bruise/bleed easily. Psychiatric/Behavioral: Negative. Negative for confusion and self-injury. The patient is not nervous/anxious. All other systems reviewed and are negative. Physical Exam   Physical Exam   Constitutional: She is oriented to person, place, and time. She appears well-nourished. She appears distressed. Moderate distress 2/2 pain   HENT:   Head: Normocephalic and atraumatic. Nose: Nose normal.   Mouth/Throat: Oropharynx is clear and moist. No oropharyngeal exudate. Eyes: Conjunctivae and EOM are normal. Pupils are equal, round, and reactive to light. Right eye exhibits no discharge. Left eye exhibits no discharge. No scleral icterus. Neck: Normal range of motion. Neck supple. No JVD present. No cervical spine tenderness   Cardiovascular: Normal rate, regular rhythm, normal heart sounds and intact distal pulses. Exam reveals no gallop and no friction rub. No murmur heard. DP pulses intact   Pulmonary/Chest: Effort normal and breath sounds normal. No stridor. No respiratory distress. She has no wheezes. She has no rales. She exhibits no tenderness. Abdominal: Soft. Bowel sounds are normal. She exhibits no distension and no mass. There is no tenderness. There is no rebound and no guarding. Musculoskeletal: Normal range of motion. She exhibits no edema, tenderness or deformity. Point tenderness to R lateral lower leg without hematoma or ecchymosis    Tenderness to palpation of midline lumbar spine and surrounding musculature   Neurological: She is alert and oriented to person, place, and time. She has normal strength. She displays no tremor and normal reflexes. No cranial nerve deficit or sensory deficit. She exhibits normal muscle tone. Coordination and gait normal.   Reflex Scores:       Brachioradialis reflexes are 2+ on the right side and 2+ on the left side.        Patellar reflexes are 2+ on the right side and 2+ on the left side. Full dorsiflexion and plantarflexion strength  Distal sensation intact in all extremities   strength equal bilaterally    Skin: Skin is warm and dry. No rash noted. She is not diaphoretic. Psychiatric: She has a normal mood and affect. Nursing note and vitals reviewed. Diagnostic Study Results     Labs -     Recent Results (from the past 12 hour(s))   EKG, 12 LEAD, INITIAL    Collection Time: 05/19/18  3:26 PM   Result Value Ref Range    Ventricular Rate 75 BPM    Atrial Rate 75 BPM    P-R Interval 236 ms    QRS Duration 146 ms    Q-T Interval 416 ms    QTC Calculation (Bezet) 464 ms    Calculated R Axis -56 degrees    Calculated T Axis 101 degrees    Diagnosis       AV dual-paced rhythm with prolonged AV conduction  Abnormal ECG  When compared with ECG of 23-MAR-2018 21:34,  Vent.  rate has increased BY   3 BPM     METABOLIC PANEL, BASIC    Collection Time: 05/19/18  4:52 PM   Result Value Ref Range    Sodium 132 (L) 136 - 145 mmol/L    Potassium 4.2 3.5 - 5.1 mmol/L    Chloride 98 97 - 108 mmol/L    CO2 32 21 - 32 mmol/L    Anion gap 2 (L) 5 - 15 mmol/L    Glucose 89 65 - 100 mg/dL    BUN 16 6 - 20 MG/DL    Creatinine 0.87 0.55 - 1.02 MG/DL    BUN/Creatinine ratio 18 12 - 20      GFR est AA >60 >60 ml/min/1.73m2    GFR est non-AA >60 >60 ml/min/1.73m2    Calcium 9.7 8.5 - 10.1 MG/DL   MAGNESIUM    Collection Time: 05/19/18  4:52 PM   Result Value Ref Range    Magnesium 2.1 1.6 - 2.4 mg/dL   TROPONIN I    Collection Time: 05/19/18  4:52 PM   Result Value Ref Range    Troponin-I, Qt. <0.04 <0.05 ng/mL   CBC W/O DIFF    Collection Time: 05/19/18  6:50 PM   Result Value Ref Range    WBC 8.0 3.6 - 11.0 K/uL    RBC 4.17 3.80 - 5.20 M/uL    HGB 13.1 11.5 - 16.0 g/dL    HCT 38.7 35.0 - 47.0 %    MCV 92.8 80.0 - 99.0 FL    MCH 31.4 26.0 - 34.0 PG    MCHC 33.9 30.0 - 36.5 g/dL    RDW 13.7 11.5 - 14.5 %    PLATELET 608 856 - 399 K/uL    MPV 9.4 8.9 - 12.9 FL    NRBC 0.0 0  WBC    ABSOLUTE NRBC 0.00 0.00 - 0.01 K/uL       Radiologic Studies -   XR TIB/FIB RT   Final Result   IMPRESSION:     Chronic mildly displaced fracture of the proximal third of the fibula with  callus formation may have an underlying acute component. No other fractures  seen. CT Results  (Last 48 hours)               05/19/18 1546  CT SPINE LUMB WO CONT Final result    Impression:   impression: Nondisplaced acute fracture superior endplate L1. Narrative:  Clinical indication: Trauma, back pain. Axial CT scan the lumbar sacral spine obtained without contrast with coronal and   sagittal reconstructions, comparison to thousand 15, CT dose reduction was   achieved through the use of a standardized protocol tailored for this   examination and automatic exposure control for dose modulation . Annel Cox There is a minimally displaced fracture of the superior endplate of L1 which is   acute. No significant canal compromise, minimal retropulsion of 10%. No   significant paraspinal masses, other levels show osteopenia and DJD but no other   acute findings. CXR Results  (Last 48 hours)               05/19/18 1612  XR CHEST PA LAT Final result    Impression:  IMPRESSION:       No acute pulmonary process. Age indeterminant upper lumbar vertebral compression   deformity       Narrative:  history: Cough and syncope       COMPARISON: 3/23/2018       FINDINGS:       Frontal and lateral chest radiograph submitted for review. Stable cardiomediastinal silhouette. Stable left chest pacing device       No acute pulmonary process. No pleural effusion. No evidence for pneumothorax. Age indeterminant upper lumbar compression deformity                   Medical Decision Making   I am the first provider for this patient. I reviewed the vital signs, available nursing notes, past medical history, past surgical history, family history and social history.     Vital Signs-Reviewed the patient's vital signs. Patient Vitals for the past 12 hrs:   Temp Pulse Resp BP SpO2   05/19/18 2111 - - - - 96 %   05/19/18 2100 - 75 14 158/74 93 %   05/19/18 2030 - 75 21 149/85 95 %   05/19/18 2000 - 75 17 155/81 98 %   05/19/18 1930 - 75 18 152/81 95 %   05/19/18 1900 - 75 16 151/81 97 %   05/19/18 1745 - 75 20 (!) 172/97 96 %   05/19/18 1643 - - - (!) 188/95 -   05/19/18 1642 - 76 18 (!) 191/103 97 %   05/19/18 1537 98 °F (36.7 °C) 75 19 (!) 185/95 95 %       Pulse Oximetry Analysis - 95% on room air    Cardiac Monitor:   Rate: 75 bpm  Rhythm: Normal Sinus Rhythm 185/95     EKG interpretation: (Preliminary)  Rhythm: paced; and regular . Rate (approx.): 75; Axis: right axis deviation; ME interval: prolonged; QRS interval: prolonged; ST/T wave: non-specific changes; Other findings: abnormal ekg. Records Reviewed: Nursing Notes, Old Medical Records and Ambulance Run Sheet    Provider Notes (Medical Decision Making):   Ddx: dysrhythmia, heart failure, ACS, orthostatics, pacemaker failure, fracture, strain    Pt presents after syncopal episode and resulting R leg and lumbar spine pain. Will interrogate pacemaker. Most recent note from Dr. [employer] w cardiology notes recent syncope and their increase in pacer rate from 60-75. It notes that her PCP has decreased her losartan gradually following multiple syncopal episodes. Will get EKG, trop, electrolytes, UA, CXR, CT lumbar spine, XR R tib fib. Will give pain control as needed. ED Course:   Initial assessment performed. The patients presenting problems have been discussed, and they are in agreement with the care plan formulated and outlined with them. I have encouraged them to ask questions as they arise throughout their visit. 4:08 PM  Received tramadol and now in CT. Will repeat blood pressure and reassess pain when she returns. 5:23 PM  Pain not controlled on PO meds. Fentanyl ordered.  Pt in high 80s to low 90s on pulse ox so placed on 2L NC. In NAD. Informed of fractures and they understand.       6:29 PM  [employer] pacemaker interrogated and no events recorded since last check on 4/30/18. Per Dr. Louise Posada with orthopedics, WBAT for both fractures. Follow up with Dr. Felton Keller in 1 week. Disposition:  ADMIT NOTE:  9:24 PM  Patient is being admitted to the hospital by Dr. Kaiser Augustin. The results of their tests and reasons for their admission have been discussed with them and/or available family. They convey agreement and understanding for the need to be admitted and for their admission diagnosis. Consultation has been made with the inpatient physician specialist for hospitalization. PLAN:  Admit to Hospitalist    I was personally available for consultation in the emergency department. I have reviewed the chart and agree with the documentation recorded by the resident, including the assessment, treatment plan, and disposition. I examined the patient with the resident and discussed management, treatment and disposition as noted above. Kan Tariq MD      Diagnosis     Clinical Impression:   1. Syncope and collapse    2. Closed fracture of first lumbar vertebra, unspecified fracture morphology, initial encounter ([facility] [address]    3. Closed fracture of proximal end of right fibula with routine healing, unspecified fracture morphology, subsequent encounter    4. Senile debility        Attestations: This note is prepared by Joes Weems. I reviewed with the resident the medical history and the resident's findings on the physical examination. I discussed with the resident the patient's diagnosis and concur with the plan. Ayan Perry MD      This note will not be viewable in [address]

## 2018-05-19 NOTE — ED NOTES
Pt presents via ems, report given to resident MD Cm Coronel. Pt had syncopal episode at home and fell down 4 steps, now has c/o back pain and right leg pain, pt  Ambulatory after fall.

## 2018-05-19 NOTE — IP AVS SNAPSHOT
[address]  [address] 
[phone] Patient: Subhash Cerrato MRN: [id number] :3/6/1932 About your hospitalization You were admitted on:  May 19, 2018 You last received care in the:  [facility] 3 ORTHOPEDICS You were discharged on:  May 22, 2018 Why you were hospitalized Your primary diagnosis was:  Not on File Your diagnoses also included:  Syncope And Collapse, Closed Compression Fracture Of First Lumbar Vertebra (Hcc) Follow-up Information Follow up With Details Comments Contact Info Griselda Garrett MD Go on 2018 For hospital follow up appointment at 10:45AM  [facility] [address] 
[phone] [address]  This is your post acute care provider [address] 
[phone] Your Scheduled Appointments Wednesday May 30, 2018 10:45 AM EDT HOSPITAL FOLLOW-UP with Griselda Garrett, [address] [place] [address],[address] [facility]) [facility] [address]  
[phone] Discharge Orders None A check bakari indicates which time of day the medication should be taken. My Medications CONTINUE taking these medications Instructions Each Dose to Equal  
 Morning Noon Evening Bedtime  
 alendronate 70 mg tablet Commonly known as:  FOSAMAX Your last dose was: Your next dose is: TAKE 1 TABLET BY MOUTH  EVERY   
     
   
   
   
  
 aspirin delayed-release 81 mg tablet Your last dose was: Your next dose is: Take 81 mg by mouth daily. 81 mg  
    
   
   
   
  
 atorvastatin 10 mg tablet Commonly known as:  LIPITOR Your last dose was: Your next dose is: Take 1 tablet by mouth  daily  
     
   
   
   
  
 calcium-cholecalciferol (D3) tablet Commonly known as:  CALTRATE 600+D Your last dose was: Your next dose is: Take 2 Tabs by mouth daily. 2 Tab  
    
   
   
   
  
 co-enzyme Q-10 100 mg capsule Commonly known as:  CO Q-10 Your last dose was: Your next dose is: Take 200 mg by mouth daily. 200 mg  
    
   
   
   
  
 escitalopram oxalate 10 mg tablet Commonly known as:  Cy Null Your last dose was: Your next dose is: TAKE 1 TABLET BY MOUTH  DAILY  
     
   
   
   
  
 gabapentin 300 mg capsule Commonly known as:  NEURONTIN Your last dose was: Your next dose is: Take 2 caps in the morning and 1 cap at night Glucosamine &Chondroit-MV-Min3 573-511-57-0.5 mg Tab Your last dose was: Your next dose is: Take 2 Tabs by mouth daily. 2 Tab MIRALAX PO Your last dose was: Your next dose is: Take  by mouth. 1-2  tsp daily OMEGA 3 FISH OIL PO Your last dose was: Your next dose is: Take 1 Cap by mouth two (2) times a day. 1 Cap SYNTHROID 75 mcg tablet Generic drug:  levothyroxine Your last dose was: Your next dose is: TAKE 1 TABLET BY MOUTH ON  MONDAY THROUGH SATURDAY AND 1/2 TAB ON SUNDAY AT [facility] MEDICALLY NECESSARY  
     
   
   
   
  
 traMADol 50 mg tablet Commonly known as:  ULTRAM  
   
Your last dose was: Your next dose is: Take 1 Tab by mouth every six (6) hours as needed for Pain. Max Daily Amount: 200 mg. Indications: Pain 50 mg  
    
   
   
   
  
 vit B Cmplx 3-FA-Vit C-Biotin 1- mg-mg-mcg tablet Commonly known as:  NEPHRO BANDAR RX Your last dose was: Your next dose is: Take 1 Tab by mouth daily. 1 Tab Opioid Education Prescription Opioids: What You Need to Know: 
 
Prescription opioids can be used to help relieve moderate-to-severe pain and are often prescribed following a surgery or injury, or for certain health conditions. These medications can be an important part of treatment but also come with serious risks. Opioids are strong pain medicines. Examples include hydrocodone, oxycodone, fentanyl, and morphine. Heroin is an example of an illegal opioid. It is important to work with your health care provider to make sure you are getting the safest, most effective care. WHAT ARE THE RISKS AND SIDE EFFECTS OF OPIOID USE? Prescription opioids carry serious risks of addiction and overdose, especially with prolonged use. An opioid overdose, often marked by slow breathing, can cause sudden death. The use of prescription opioids can have a number of side effects as well, even when taken as directed. · Tolerance-meaning you might need to take more of a medication for the same pain relief · Physical dependence-meaning you have symptoms of withdrawal when the medication is stopped. Withdrawal symptoms can include nausea, sweating, chills, diarrhea, stomach cramps, and muscle aches. Withdrawal can last up to several weeks, depending on which drug you took and how long you took it. · Increased sensitivity to pain · Constipation · Nausea, vomiting, and dry mouth · Sleepiness and dizziness · Confusion · Depression · Low levels of testosterone that can result in lower sex drive, energy, and strength · Itching and sweating RISKS ARE GREATER WITH:      
· History of drug misuse, substance use disorder, or overdose · Mental health conditions (such as depression or anxiety) · Sleep apnea · Older age (72 years or older) · Pregnancy Avoid alcohol while taking prescription opioids. Also, unless specifically advised by your health care provider, medications to avoid include: · Benzodiazepines (such as Xanax or Valium) · Muscle relaxants (such as Soma or Flexeril) · Hypnotics (such as Ambien or Lunesta) · Other prescription opioids KNOW YOUR OPTIONS Talk to your health care provider about ways to manage your pain that don't involve prescription opioids. Some of these options may actually work better and have fewer risks and side effects. Options may include: 
· Pain relievers such as acetaminophen, ibuprofen, and naproxen · Some medications that are also used for depression or seizures · Physical therapy and exercise · Counseling to help patients learn how to cope better with triggers of pain and stress. · Application of heat or cold compress · Massage therapy · Relaxation techniques Be Informed Make sure you know the name of your medication, how much and how often to take it, and its potential risks & side effects. IF YOU ARE PRESCRIBED OPIOIDS FOR PAIN: 
· Never take opioids in greater amounts or more often than prescribed. Remember the goal is not to be pain-free but to manage your pain at a tolerable level. · Follow up with your primary care provider to: · Work together to create a plan on how to manage your pain. · Talk about ways to help manage your pain that don't involve prescription opioids. · Talk about any and all concerns and side effects. · Help prevent misuse and abuse. · Never sell or share prescription opioids · Help prevent misuse and abuse. · Store prescription opioids in a secure place and out of reach of others (this may include visitors, children, friends, and family). · Safely dispose of unused/unwanted prescription opioids: Find your community drug take-back program or your pharmacy mail-back program, or flush them down the toilet, following guidance from the Food and Drug Administration (www.fda.gov/Drugs/ResourcesForYou). · Visit www.cdc.gov/drugoverdose to learn about the risks of opioid abuse and overdose. · If you believe you may be struggling with addiction, tell your health care provider and ask for guidance or call Eduar Basilio at 0-350-261-[facility]. Discharge Instructions Fainting: Care Instructions Your Care Instructions When you faint, or pass out, you lose consciousness for a short time. A brief drop in blood flow to the brain often causes it. When you fall or lie down, more blood flows to your brain and you regain consciousness. Emotional stress, pain, or overheating-especially if you have been standing-can make you faint. In these cases, fainting is usually not serious. But fainting can be a sign of a more serious problem. Your doctor may want you to have more tests to rule out other causes. The treatment you need depends on the reason why you fainted. The doctor has checked you carefully, but problems can develop later. If you notice any problems or new symptoms, get medical treatment right away. Follow-up care is a key part of your treatment and safety. Be sure to make and go to all appointments, and call your doctor if you are having problems. It's also a good idea to know your test results and keep a list of the medicines you take. How can you care for yourself at home? · Drink plenty of fluids to prevent dehydration. If you have kidney, heart, or liver disease and have to limit fluids, talk with your doctor before you increase your fluid intake. When should you call for help? Call 911 anytime you think you may need emergency care. For example, call if: 
? · You have symptoms of a heart problem. These may include: ¨ Chest pain or pressure. ¨ Severe trouble breathing. ¨ A fast or irregular heartbeat. ¨ Lightheadedness or sudden weakness. ¨ Coughing up pink, foamy mucus. ¨ Passing out.  
After you call 911, the  may tell you to chew 1 adult-strength or 2 to 4 low-dose aspirin. Wait for an ambulance. Do not try to drive yourself. ? · You have symptoms of a stroke. These may include: 
¨ Sudden numbness, tingling, weakness, or loss of movement in your face, arm, or leg, especially on only one side of your body. ¨ Sudden vision changes. ¨ Sudden trouble speaking. ¨ Sudden confusion or trouble understanding simple statements. ¨ Sudden problems with walking or balance. ¨ A sudden, severe headache that is different from past headaches. ? · You passed out (lost consciousness) again. ? Watch closely for changes in your health, and be sure to contact your doctor if: 
? · You do not get better as expected. Where can you learn more? Go to http://vikash-maria g.info/. Enter Y922 in the search box to learn more about \"Fainting: Care Instructions. \" Current as of: 2017 Content Version: 11.4 © 1801-9021 [employer]ine. Care instructions adapted under license by [employer] (which disclaims liability or warranty for this information). If you have questions about a medical condition or this instruction, always ask your healthcare professional. [place] [address] any warranty or liability for your use of this information. PATIENT DISCHARGE INSTRUCTIONS PATIENT DISCHARGE INSTRUCTIONS Naya Longo / [id number] : 3/6/1932 Admitted 2018 Discharged: 2018 · It is important that you take the medication exactly as they are prescribed. · Keep your medication in the bottles provided by the pharmacist and keep a list of the medication names, dosages, and times to be taken in your wallet. · Do not take other medications without consulting your doctor. What to do at [facility] Recommended Diet: Cardiac Diet Recommended Activity: PT/OT per Home Health Signed By: Veronica Brooks MD   
 May 21, 2018 ACO Transitions of Care Introducing [facility] [address] offers a voluntary care coordination program to provide high quality service and care to [facility] fee-for-service beneficiaries. Ita Seals was designed to help you enhance your health and well-being through the following services: ? Transitions of Care  support for individuals who are transitioning from one care setting to another (example: Hospital to home). ? Chronic and Complex Care Coordination  support for individuals and caregivers of those with serious or chronic illnesses or with more than one chronic (ongoing) condition and those who take a number of different medications. If you meet specific medical criteria, a [address] [place] [address] Rd may call you directly to coordinate your care with your primary care physician and your other care providers. For questions about the [facility] programs, please, contact your physicians office. For general questions or additional information about Accountable Care Organizations: 
Please visit www.medicare.gov/acos. html or call 1-800-MEDICARE (9-821.975.5167) TTY users should call 6-313.952.1437. BostInno Announcement We are excited to announce that we are making your provider's discharge notes available to you in BostInno. You will see these notes when they are completed and signed by the physician that discharged you from your recent hospital stay. If you have any questions or concerns about any information you see in BostInno, please call the Health Information Department where you were seen or reach out to your Primary Care Provider for more information about your plan of care. Introducing [facility]! Dear Noa Neri: 
Thank you for requesting a BostInno account. Our records indicate that you already have an active BostInno account. You can access your account anytime at [email or web address] Did you know that you can access your hospital and ER discharge instructions at any time in Cura TV? You can also review all of your test results from your hospital stay or ER visit. Additional Information If you have questions, please visit the Frequently Asked Questions section of the Sulfagenixt website at [email or web address] Remember, Cura TV is NOT to be used for urgent needs. For medical emergencies, dial 911. Now available from your iPhone and Android! Introducing Long Perry As a [facility] patient, I wanted to make you aware of our electronic visit tool called Long Perry. AdMaster 24/7 allows you to connect within minutes with a medical provider 24 hours a day, seven days a week via a mobile device or tablet or logging into a secure website from your computer. You can access Long Perry from anywhere in the [place]. A virtual visit might be right for you when you have a simple condition and feel like you just dont want to get out of bed, or cant get away from work for an appointment, when your regular [facility] provider is not available (evenings, weekends or holidays), or when youre out of town and need minor care. Electronic visits cost only $49 and if the [facility] 24/7 provider determines a prescription is needed to treat your condition, one can be electronically transmitted to a nearby pharmacy*. Please take a moment to enroll today if you have not already done so. The enrollment process is free and takes just a few minutes. To enroll, please download the AdMaster 24/7 lionel to your tablet or phone, or visit [email or web address] to enroll on your computer. And, as an [address] [place] [address] patient with a Mendix account, the results of your visits will be scanned into your electronic medical record and your primary care provider will be able to view the scanned results. We urge you to continue to see your regular [facility] provider for your ongoing medical care. And while your primary care provider may not be the one available when you seek a [email or web address] virtual visit, the peace of mind you get from getting a real diagnosis real time can be priceless. For more information on [email or web address], view our Frequently Asked Questions (FAQs) at [email or web address]. Sincerely, 
 
Sofy Willingham MD 
Chief Medical Officer [address] *:  certain medications cannot be prescribed via [email or web address] Providers Seen During Your Hospitalization Provider Specialty Primary office phone Filemon Dee MD Emergency Medicine [phone] Shanel Muller MD Internal Medicine [phone] Your Primary Care Physician (PCP) Primary Care Physician Office Phone Office Fax Herlinda Graham [phone] You are allergic to the following Allergen Reactions Amoxicillin Unknown (comments) Aricept (Donepezil) Other (comments) Bad dreams. Augmentin (Amoxicillin-Pot Clavulanate) Unknown (comments) Pcn (Penicillins) Rash Zithromax (Azithromycin) Unknown (comments) Recent Documentation Height Weight Breastfeeding? BMI OB Status Smoking Status 1.651 m 77.7 kg No 28.51 kg/m2 Postmenopausal Never Smoker Emergency Contacts Name Discharge Info Relation Home Work Mobile [facility] DISCHARGE CAREGIVER [3] Child [2] [phone] [id number]erales DISCHARGE CAREGIVER [3] Spouse [3] [phone] Patient Belongings The following personal items are in your possession at time of discharge: 
  Dental Appliances: None  Visual Aid: Glasses      Home Medications: None   Jewelry: Ring  Clothing: None    Other Valuables: Eyeglasses, Other (comment) (hearing aids) Please provide this summary of care documentation to your next provider. Signatures-by signing, you are acknowledging that this After Visit Summary has been reviewed with you and you have received a copy. Patient Signature:  ____________________________________________________________ Date:  ____________________________________________________________  
  
Gwendlo Finger Provider Signature:  ____________________________________________________________ Date:  ____________________________________________________________

## 2018-05-20 NOTE — CONSULTS
Elizabeth Villarreal MD - Adult Reconstruction and Total Joint Replacement     Orthopaedic Surgery        PATIENT NAME: Rolando Carranza       :  3/6/1932       MRN:  [id number]        80year-old female admitted with falls and syncope. Complains of back pain. No radicular pain. Workup in the ER included a CT scan which demonstrated L1 compression fracture. Denies bowel or bladder changes. No previous spinal injury. Past medical history and review of systems are as noted. Exam shows tenderness over the thoracolumbar junction. Painless passive range of motion of the hip. Neuromotor exam of bilateral lower extremities grossly intact. No subjective paresthesias. Sensation intact throughout. 2+ pulses. CT scan:  Minimal endplate compression fracture of L1 appears acute. Xray:Old and essentially healed proximal fibular fracture. Assessment and plan:  Acute L1 compression fracture, minimal deformity. Neurovascularly intact. Recommend weight bear as tolerated with a walker and nasal calcitonin spray. Follow up in 1 week with Dr. Katya Sebastian for repeat films. REVIEW OF SYSTEMS: A comprehensive review of systems was negative except for that written in the HPI.     Patient Active Problem List    Diagnosis Date Noted    Syncope and collapse 2018    Acquired hypothyroidism 10/30/2017    Stenosis of both internal carotid arteries 2016    Syncope 12/15/2016    Hypokalemia 12/15/2016    Essential hypertension 2015    Persistent disorder of initiating or maintaining sleep 2015    Anxiety disorder 2012    Vasovagal syndrome 2012    Syncope due to orthostatic hypotension 2012    Orthostasis 2012    Essential hypertension, benign 2012    Mobitz (type) II atrioventricular block 2012    Cardiac pacemaker in situ 2012    DJD (degenerative joint disease) of knee 2012    S/P cardiac pacemaker procedure 2011    SSS (sick sinus syndrome) ([facility] 08/20/2011    Depression 07/21/2011    Dementia 07/21/2011    Personal history of colon cancer 06/23/2011    Epigastric pain 02/16/2011    Hyperlipidemia 02/16/2011    Neuropathy 02/16/2011    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 Case Jara in 2011    Dementia 7/21/2011    Epigastric pain 2/16/2011    Essential hypertension     Essential hypertension 12/4/2015    Hyperlipidemia 2/16/2011    Hypertension 2/16/2011    Hypothyroidism 2/16/2011    s/p VALDES in 1998 and 1999 for hyperthyroidism    Neuropathy 2/16/2011    Orthostasis 7/6/2012    Orthostasis 7/6/2012    Osteoporosis 2/16/2011    Pacemaker     Persistent disorder of initiating or maintaining sleep 4/22/2015    S/P cardiac pacemaker procedure 8/24/2011    SSS (sick sinus syndrome) ([facility] 8/20/2011    Syncope     Syncope and collapse 8/19/2011    Urinary frequency 2/16/2011      Past Surgical History:   Procedure Laterality Date    HX GI  1993    colon resection    HX HEENT      tonsillectomy in 1950    HX PACEMAKER      AK ENTEROSCOPY > 2ND PRTN ILEUM CONTROL BLEEDING  6/23/2011           Prior to Admission medications    Medication Sig Start Date End Date Taking?  Authorizing Provider   SYNTHROID 75 mcg tablet TAKE 1 TABLET BY MOUTH ON  MONDAY THROUGH SATURDAY AND 1/2 TAB ON SUNDAY AT [facility] MEDICALLY NECESSARY 5/19/18   Yvrose Reyes MD   gabapentin (NEURONTIN) 300 mg capsule Take 2 caps in the morning and 1 cap at night 3/5/18   Historical Provider   escitalopram oxalate (LEXAPRO) 10 mg tablet TAKE 1 TABLET BY MOUTH  DAILY 4/23/18   Ofelia Hardin MD   traMADol Dorie Gambles) 50 mg tablet Take 1 Tab by mouth every six (6) hours as needed for Pain. Max Daily Amount: 200 mg. Indications: Pain 3/24/18   Kelly Pineda DO   alendronate (FOSAMAX) 70 mg tablet TAKE 1 TABLET BY MOUTH  EVERY SUNDAY 1/11/18   Suresh Lewis MD   atorvastatin (LIPITOR) 10 mg tablet Take 1 tablet by mouth  daily 7/10/17   Suresh Lewis MD   co-enzyme Q-10 (CO Q-10) 100 mg capsule Take 200 mg by mouth daily. Historical Provider   Glucosamine &Chondroit-MV-Min3 274-616-85-0.5 mg tab Take 2 Tabs by mouth daily. Historical Provider   calcium-cholecalciferol, D3, (CALTRATE 600+D) tablet Take 2 Tabs by mouth daily. Historical Provider   vit B Cmplx 3-FA-Vit C-Biotin (NEPHRO BANDAR RX) 1- mg-mg-mcg tablet Take 1 Tab by mouth daily. Historical Provider   OMEGA-3 FATTY ACIDS/FISH OIL (OMEGA 3 FISH OIL PO) Take 1 Cap by mouth two (2) times a day. Historical Provider   POLYETHYLENE GLYCOL 3350 (MIRALAX PO) Take  by mouth. 1-2   tsp daily    Historical Provider   aspirin delayed-release 81 mg tablet Take 81 mg by mouth daily. Historical Provider     Allergies   Allergen Reactions    Amoxicillin Unknown (comments)    Aricept [Donepezil] Other (comments)     Bad dreams.     Augmentin [Amoxicillin-Pot Clavulanate] Unknown (comments)    Pcn [Penicillins] Rash    Zithromax [Azithromycin] Unknown (comments)      Social History   Substance Use Topics    Smoking status: Never Smoker    Smokeless tobacco: Never Used    Alcohol use No      Family History   Problem Relation Age of Onset    Diabetes Mother     Thyroid Disease Mother     Heart Disease Mother     Hypertension Father     Heart Disease Father     Diabetes Sister     Diabetes Brother         Arnie Morales MD

## 2018-05-20 NOTE — PROGRESS NOTES
INTERNAL MEDICINE ATTENDING NOTE    S: Ms. Lucien Arrington was seen by me today during rounds. At this time, she is resting comfortably. She is a little anxious. Pain controlled except when moving. Not ambulatory at this time. ROS otherwise unremarkable except as noted elsewhere. O: Blood pressure 107/58, pulse 75, temperature 97.8 °F (36.6 °C), resp. rate 18, height 5' 5\" (1.651 m), weight 171 lb 4.8 oz (77.7 kg), SpO2 95 %, not currently breastfeeding. Gen: Patient is in no acute distress. Lungs: CTAB. Heart: RRR. Abd: S, NT, ND, BS present. Extremities: Warm. Recent Results (from the past 12 hour(s))   URINALYSIS W/ RFLX MICROSCOPIC    Collection Time: 05/20/18 12:03 AM   Result Value Ref Range    Color YELLOW/STRAW      Appearance CLEAR CLEAR      Specific gravity 1.010 1.003 - 1.030      pH (UA) 7.5 5.0 - 8.0      Protein NEGATIVE  NEG mg/dL    Glucose NEGATIVE  NEG mg/dL    Ketone NEGATIVE  NEG mg/dL    Bilirubin NEGATIVE  NEG      Blood NEGATIVE  NEG      Urobilinogen 0.2 0.2 - 1.0 EU/dL    Nitrites NEGATIVE  NEG      Leukocyte Esterase NEGATIVE  NEG     METABOLIC PANEL, COMPREHENSIVE    Collection Time: 05/20/18  4:48 AM   Result Value Ref Range    Sodium 136 136 - 145 mmol/L    Potassium 3.9 3.5 - 5.1 mmol/L    Chloride 102 97 - 108 mmol/L    CO2 28 21 - 32 mmol/L    Anion gap 6 5 - 15 mmol/L    Glucose 84 65 - 100 mg/dL    BUN 16 6 - 20 MG/DL    Creatinine 0.74 0.55 - 1.02 MG/DL    BUN/Creatinine ratio 22 (H) 12 - 20      GFR est AA >60 >60 ml/min/1.73m2    GFR est non-AA >60 >60 ml/min/1.73m2    Calcium 9.3 8.5 - 10.1 MG/DL    Bilirubin, total 0.8 0.2 - 1.0 MG/DL    ALT (SGPT) 25 12 - 78 U/L    AST (SGOT) 25 15 - 37 U/L    Alk.  phosphatase 71 45 - 117 U/L    Protein, total 6.5 6.4 - 8.2 g/dL    Albumin 3.0 (L) 3.5 - 5.0 g/dL    Globulin 3.5 2.0 - 4.0 g/dL    A-G Ratio 0.9 (L) 1.1 - 2.2     CBC WITH AUTOMATED DIFF    Collection Time: 05/20/18  4:48 AM   Result Value Ref Range    WBC 6.5 3.6 - 11.0 K/uL    RBC 4.18 3.80 - 5.20 M/uL    HGB 13.1 11.5 - 16.0 g/dL    HCT 38.6 35.0 - 47.0 %    MCV 92.3 80.0 - 99.0 FL    MCH 31.3 26.0 - 34.0 PG    MCHC 33.9 30.0 - 36.5 g/dL    RDW 13.6 11.5 - 14.5 %    PLATELET 950 108 - 606 K/uL    MPV 9.6 8.9 - 12.9 FL    NRBC 0.0 0  WBC    ABSOLUTE NRBC 0.00 0.00 - 0.01 K/uL    NEUTROPHILS 72 32 - 75 %    LYMPHOCYTES 16 12 - 49 %    MONOCYTES 7 5 - 13 %    EOSINOPHILS 4 0 - 7 %    BASOPHILS 0 0 - 1 %    IMMATURE GRANULOCYTES 0 0.0 - 0.5 %    ABS. NEUTROPHILS 4.7 1.8 - 8.0 K/UL    ABS. LYMPHOCYTES 1.1 0.8 - 3.5 K/UL    ABS. MONOCYTES 0.5 0.0 - 1.0 K/UL    ABS. EOSINOPHILS 0.2 0.0 - 0.4 K/UL    ABS. BASOPHILS 0.0 0.0 - 0.1 K/UL    ABS. IMM. GRANS. 0.0 0.00 - 0.04 K/UL    DF AUTOMATED          R tib-fib xray: Chronic mildly displaced fracture of the proximal third of the fibula with callus formation may have an underlying acute component    XR chest PA and Lateral: No acute pulmonary process. Age indeterminant upper lumbar vertebral compression deformity    CT spine: Nondisplaced acute fracture superior endplate L1.      A / P:  1. Syncope and collapse (5/19/2018): Recurrent; has been following with cardiology, and prior work up with Dr. Kiara Leach [facility]) and Dr. Joni Olmstead at [address] [place] [address] has been unrevealing. She has a pacemaker, with rate readjusted after syncopal spell in April. Cardiology consulted. 2. Low back pain, with acute non displaced fracture superior endplate L1 POA: Lidoderm patch, prn percocet. ? Kyphoplasty candidate. Orthopedics consulted. 3. Right fibula fracture POA: Initial fracture after last syncope in April 2018/ Pain increased after fall today  4. X-ray as above. Orthopedics to see. Currently cannot ambulate. 5. HTN: Currently off meds due to syncope. BP increased at admit, may need to resume if persistent after pain control  6. Hypothyroidism POA continue current replacement  7. Neuropathy POA continue neurontin  8.  Mild dementia POA  9. Overweight POA Body mass index is 28.51 kg/(m^2). 10. DVT prophylaxis with lovenox  11.  Code status: full code     Octavio Carrero MD, FACP  Best contact is via Pager: [phone], or via hospital  at [phone]

## 2018-05-20 NOTE — PROGRESS NOTES
Bedside and Verbal shift change report given to Via Birdi (oncoming nurse) by Roger Fleming (offgoing nurse). Report included the following information SBAR, Kardex, Intake/Output, MAR and Recent Results.

## 2018-05-20 NOTE — CONSULTS
Subjective:      Date of  Admission: 5/19/2018  3:12 PM     Admission type:Emergency    Ciara Tariq is a 80 y.o. female admitted for Syncope and collapse. She presented yesterday with lower back and right leg pain after sustaining another syncopal episode. Episodes similar to previous many episodes, for which extensive evaluation has been done. She denies chest pain, chest pressure/discomfort, dyspnea, palpitations, irregular heart beats, fatigue, orthopnea, paroxysmal nocturnal dyspnea, exertional chest pressure/discomfort, claudication, lower extremity edema, tachypnea. .    Patient Active Problem List    Diagnosis Date Noted    Syncope and collapse 05/19/2018    Acquired hypothyroidism 10/30/2017    Stenosis of both internal carotid arteries 12/16/2016    Syncope 12/15/2016    Hypokalemia 12/15/2016    Essential hypertension 12/04/2015    Persistent disorder of initiating or maintaining sleep 04/22/2015    Anxiety disorder 12/04/2012    Vasovagal syndrome 07/11/2012    Syncope due to orthostatic hypotension 07/11/2012    Orthostasis 07/06/2012    Essential hypertension, benign 06/01/2012    Mobitz (type) II atrioventricular block 06/01/2012    Cardiac pacemaker in situ 06/01/2012    DJD (degenerative joint disease) of knee 01/25/2012    S/P cardiac pacemaker procedure 08/24/2011    SSS (sick sinus syndrome) ([facility] [address] 08/20/2011    Depression 07/21/2011    Dementia 07/21/2011    Personal history of colon cancer 06/23/2011    Epigastric pain 02/16/2011    Hyperlipidemia 02/16/2011    Neuropathy 02/16/2011    Postablative hypothyroidism 02/16/2011    Urinary frequency 02/16/2011    Osteoporosis 02/16/2011      Baron Jonnathan MD  Past Medical History:   Diagnosis Date    Acquired hypothyroidism 10/30/2017    Arthritis     Back pain 2/16/2011    Cancer ([address] 1993    colon    Chronic pain     in her back    Dementia 2011    Mild; Neuropsych testing with Dr. Ashanti Fernandez in 2011  Dementia 7/21/2011    Epigastric pain 2/16/2011    Essential hypertension     Essential hypertension 12/4/2015    Hyperlipidemia 2/16/2011    Hypertension 2/16/2011    Hypothyroidism 2/16/2011    s/p VALDES in 1998 and 1999 for hyperthyroidism    Neuropathy 2/16/2011    Orthostasis 7/6/2012    Orthostasis 7/6/2012    Osteoporosis 2/16/2011    Pacemaker     Persistent disorder of initiating or maintaining sleep 4/22/2015    S/P cardiac pacemaker procedure 8/24/2011    SSS (sick sinus syndrome) ([facility] [address] 8/20/2011    Syncope     Syncope and collapse 8/19/2011    Urinary frequency 2/16/2011      Past Surgical History:   Procedure Laterality Date    HX GI  1993    colon resection    HX HEENT      tonsillectomy in 1950    HX PACEMAKER      WY ENTEROSCOPY > 2ND PRTN ILEUM CONTROL BLEEDING  6/23/2011          Allergies   Allergen Reactions    Amoxicillin Unknown (comments)    Aricept [Donepezil] Other (comments)     Bad dreams.     Augmentin [Amoxicillin-Pot Clavulanate] Unknown (comments)    Pcn [Penicillins] Rash    Zithromax [Azithromycin] Unknown (comments)      Family History   Problem Relation Age of Onset    Diabetes Mother     Thyroid Disease Mother     Heart Disease Mother     Hypertension Father     Heart Disease Father     Diabetes Sister     Diabetes Brother       Current Facility-Administered Medications   Medication Dose Route Frequency    sodium chloride (NS) flush 5-10 mL  5-10 mL IntraVENous Q8H    sodium chloride (NS) flush 5-10 mL  5-10 mL IntraVENous PRN    acetaminophen (TYLENOL) tablet 650 mg  650 mg Oral Q6H PRN    naloxone (NARCAN) injection 0.4 mg  0.4 mg IntraVENous PRN    ondansetron (ZOFRAN) injection 4 mg  4 mg IntraVENous Q4H PRN    bisacodyl (DULCOLAX) suppository 10 mg  10 mg Rectal DAILY PRN    enoxaparin (LOVENOX) injection 40 mg  40 mg SubCUTAneous Q24H    escitalopram oxalate (LEXAPRO) tablet 10 mg  10 mg Oral DAILY    atorvastatin (LIPITOR) tablet 10 mg  10 mg Oral QHS    calcium-vitamin D (OS-KAUSHIK) 500 mg-200 unit tablet  1 Tab Oral BID WITH MEALS    aspirin delayed-release tablet 81 mg  81 mg Oral DAILY    levothyroxine (SYNTHROID) tablet 75 mcg  75 mcg Oral 6am    polyethylene glycol (MIRALAX) packet 17 g  17 g Oral DAILY    oxyCODONE-acetaminophen (PERCOCET) 5-325 mg per tablet 1-2 Tab  1-2 Tab Oral Q6H PRN    lidocaine (LIDODERM) 5 % patch 1 Patch  1 Patch TransDERmal Q24H         Review of Symptoms:  Constitutional: negative  Eyes: negative  Ears, nose, mouth, throat, and face: negative  Respiratory: No exertional dyspnea, orthopnea, PND, cough, hemoptysis, URI. Cardiovascular: No CP, palpitations, sweating, lower extremity swelling. Gastrointestinal: No nausea, vomiting, diarrhea, constipation, abdominal pain, hematemesis, melena, hematochezia  Genitourinary:No urinary complaints. Musculoskeletal: as above otherwise negative  Neurological: negative  Behvioral/Psych: negative  Endocrine: negative     Subjective:      Visit Vitals    /58 (BP 1 Location: Right arm, BP Patient Position: At rest)    Pulse 75    Temp 97.8 °F (36.6 °C)    Resp 18    Ht 5' 5\" (1.651 m)    Wt 171 lb 4.8 oz (77.7 kg)    SpO2 95%    Breastfeeding No    BMI 28.51 kg/m2       Physical Exam  Abdomen: soft, non-tender.  Bowel sounds normal.   Extremities: no cyanosis or edema  Heart: regular rate and rhythm, S1, S2 normal, no murmur, click, rub or gallop  Lungs: clear to auscultation bilaterally  Neck: supple, no carotid bruit and no JVD  Neurologic: Grossly normal  Pulses: 2+ and symmetric      Cardiographics    Telemetry: AV paced    ECG: AV paced    Echocardiogram: Not done    Labs:   Recent Results (from the past 24 hour(s))   EKG, 12 LEAD, INITIAL    Collection Time: 05/19/18  3:26 PM   Result Value Ref Range    Ventricular Rate 75 BPM    Atrial Rate 75 BPM    P-R Interval 236 ms    QRS Duration 146 ms    Q-T Interval 416 ms    QTC Calculation (Bezet) 464 ms    Calculated R Axis -56 degrees    Calculated T Axis 101 degrees    Diagnosis       AV dual-paced rhythm with prolonged AV conduction  Abnormal ECG  When compared with ECG of 23-MAR-2018 21:34,  Vent.  rate has increased BY   3 BPM     METABOLIC PANEL, BASIC    Collection Time: 05/19/18  4:52 PM   Result Value Ref Range    Sodium 132 (L) 136 - 145 mmol/L    Potassium 4.2 3.5 - 5.1 mmol/L    Chloride 98 97 - 108 mmol/L    CO2 32 21 - 32 mmol/L    Anion gap 2 (L) 5 - 15 mmol/L    Glucose 89 65 - 100 mg/dL    BUN 16 6 - 20 MG/DL    Creatinine 0.87 0.55 - 1.02 MG/DL    BUN/Creatinine ratio 18 12 - 20      GFR est AA >60 >60 ml/min/1.73m2    GFR est non-AA >60 >60 ml/min/1.73m2    Calcium 9.7 8.5 - 10.1 MG/DL   MAGNESIUM    Collection Time: 05/19/18  4:52 PM   Result Value Ref Range    Magnesium 2.1 1.6 - 2.4 mg/dL   TROPONIN I    Collection Time: 05/19/18  4:52 PM   Result Value Ref Range    Troponin-I, Qt. <0.04 <0.05 ng/mL   CBC W/O DIFF    Collection Time: 05/19/18  6:50 PM   Result Value Ref Range    WBC 8.0 3.6 - 11.0 K/uL    RBC 4.17 3.80 - 5.20 M/uL    HGB 13.1 11.5 - 16.0 g/dL    HCT 38.7 35.0 - 47.0 %    MCV 92.8 80.0 - 99.0 FL    MCH 31.4 26.0 - 34.0 PG    MCHC 33.9 30.0 - 36.5 g/dL    RDW 13.7 11.5 - 14.5 %    PLATELET 453 446 - 379 K/uL    MPV 9.4 8.9 - 12.9 FL    NRBC 0.0 0  WBC    ABSOLUTE NRBC 0.00 0.00 - 0.01 K/uL   URINALYSIS W/ RFLX MICROSCOPIC    Collection Time: 05/20/18 12:03 AM   Result Value Ref Range    Color YELLOW/STRAW      Appearance CLEAR CLEAR      Specific gravity 1.010 1.003 - 1.030      pH (UA) 7.5 5.0 - 8.0      Protein NEGATIVE  NEG mg/dL    Glucose NEGATIVE  NEG mg/dL    Ketone NEGATIVE  NEG mg/dL    Bilirubin NEGATIVE  NEG      Blood NEGATIVE  NEG      Urobilinogen 0.2 0.2 - 1.0 EU/dL    Nitrites NEGATIVE  NEG      Leukocyte Esterase NEGATIVE  NEG     METABOLIC PANEL, COMPREHENSIVE    Collection Time: 05/20/18  4:48 AM   Result Value Ref Range    Sodium 136 136 - 145 mmol/L Potassium 3.9 3.5 - 5.1 mmol/L    Chloride 102 97 - 108 mmol/L    CO2 28 21 - 32 mmol/L    Anion gap 6 5 - 15 mmol/L    Glucose 84 65 - 100 mg/dL    BUN 16 6 - 20 MG/DL    Creatinine 0.74 0.55 - 1.02 MG/DL    BUN/Creatinine ratio 22 (H) 12 - 20      GFR est AA >60 >60 ml/min/1.73m2    GFR est non-AA >60 >60 ml/min/1.73m2    Calcium 9.3 8.5 - 10.1 MG/DL    Bilirubin, total 0.8 0.2 - 1.0 MG/DL    ALT (SGPT) 25 12 - 78 U/L    AST (SGOT) 25 15 - 37 U/L    Alk. phosphatase 71 45 - 117 U/L    Protein, total 6.5 6.4 - 8.2 g/dL    Albumin 3.0 (L) 3.5 - 5.0 g/dL    Globulin 3.5 2.0 - 4.0 g/dL    A-G Ratio 0.9 (L) 1.1 - 2.2     CBC WITH AUTOMATED DIFF    Collection Time: 05/20/18  4:48 AM   Result Value Ref Range    WBC 6.5 3.6 - 11.0 K/uL    RBC 4.18 3.80 - 5.20 M/uL    HGB 13.1 11.5 - 16.0 g/dL    HCT 38.6 35.0 - 47.0 %    MCV 92.3 80.0 - 99.0 FL    MCH 31.3 26.0 - 34.0 PG    MCHC 33.9 30.0 - 36.5 g/dL    RDW 13.6 11.5 - 14.5 %    PLATELET 407 824 - 107 K/uL    MPV 9.6 8.9 - 12.9 FL    NRBC 0.0 0  WBC    ABSOLUTE NRBC 0.00 0.00 - 0.01 K/uL    NEUTROPHILS 72 32 - 75 %    LYMPHOCYTES 16 12 - 49 %    MONOCYTES 7 5 - 13 %    EOSINOPHILS 4 0 - 7 %    BASOPHILS 0 0 - 1 %    IMMATURE GRANULOCYTES 0 0.0 - 0.5 %    ABS. NEUTROPHILS 4.7 1.8 - 8.0 K/UL    ABS. LYMPHOCYTES 1.1 0.8 - 3.5 K/UL    ABS. MONOCYTES 0.5 0.0 - 1.0 K/UL    ABS. EOSINOPHILS 0.2 0.0 - 0.4 K/UL    ABS. BASOPHILS 0.0 0.0 - 0.1 K/UL    ABS. IMM. GRANS. 0.0 0.00 - 0.04 K/UL    DF AUTOMATED          Assessment:     Assessment:       Active Problems:    Syncope and collapse (5/19/2018)         Plan:     1. Recurrent chronic syncope: it appears that she had been extensively evaluated for this issue in the past and so far no etiology has been clearly found, except for one report of orthostasis as per Dr. Jimmy Layton note. She follows up with EP at [facility] and has PPM. Reportedly changes were recently made to help with these episodes.  Per pt she has also been evaluated by neurology for these symptoms. Not sure we will be able to find cause during this admission. Check Echo and interrogate device. Check orthostatic, though per pt they have been -ve at home. D/w Dr. Ady Sullivan.

## 2018-05-20 NOTE — H&P
Hospitalist Admission Note    NAME:  Rolando Carranza   :   3/6/1932   MRN:   [id number]     Date of admit: 2018    PCP: Mihir Newby MD    Assessment/Plan:     Syncope and collapse (2018) POA  Injury with new low back pain and increased right leg pain  Syncope has been recurrent, followed by Dr Shiela Greene and Chang Angel at [facility]  Etiology unclear  Now has Pacemaker, rate recently increased after syncope in end of 2018  Passed out again today despite rate increase  Admit  Ask cards to see, ? Anything else to do    Acute non displaced fracture superior endplate L1 POA  New severe low back pain due to trauma, currently cannot get OOB  Lidoderm patch  PRN percocet  Orthopedics to see, ? Kyphoplasty candidate    Right fibula fracture POA  Initial fracture after last syncope in 2018  Pain increased after fall today  X-ray Chronic mildly displaced fracture of the proximal third of the fibula with         callus formation may have an underlying acute component  Orthopedics to see  PRN pain meds  Severe pain moving the leg, cannot walk    History of HTN POA  Currently off Rx per daughter  BP increased at admit, may need to resume if persistent after pain control    Hypothyroidism POA continue current replacement    Neuropathy POA continue neurontin    Mild dementia POA    Overweight POA Body mass index is 28.51 kg/(m^2). Given the patient's current clinical presentation, I have a high level of concern for decompensation if discharged from the emergency department. My assessment of this patient's clinical condition and my plan of care is as noted above. DVT prophylaxis with lovenox    Code status: full code  NOK: daughter    History     CHIEF COMPLAINT: \"My low back and right leg are painful after I passed out today\"    HISTORY OF PRESENT ILLNESS:  80 Y. O. WF  Known recurrent syncope over past year  Seen By Dr Shiela Greene and then Dr Chang Angel at [facility], daughter unaware of etiology, ? Vasovagal  Eventually had pacemaker placed  Recurrent syncope end of April 2018, Pacemaker rate increased from 65 to 75  Fractured her right fibula, mild pain at area, able to walk    Today was on patio, walking, suddenly passed out and woke up on floor  Woke up quickly, a bit groggy initially, then back to baseline  Pain in low back and increased pain in right leg  Got up and took some tylenol, but pain in lumbar spine and right calve increased to severe  Not able to get up and walk, came to ED  No HA or head trauma or SOB or CP or N/V or diarrhea    ED HD stable  Acute L1 compression fracture, ?  Increased fracture at right fibula  Not able to ambulate  We were called to admit the patient      Past Medical History:   Diagnosis Date    Acquired hypothyroidism 10/30/2017    Arthritis     Back pain 2/16/2011    Cancer ([facility] 1993    colon    Chronic pain     in her back    Dementia 2011    Mild; Neuropsych testing with Dr. 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    S/P cardiac pacemaker procedure 8/24/2011    SSS (sick sinus syndrome) ([facility] 8/20/2011    Syncope     Syncope and collapse 8/19/2011    Urinary frequency 2/16/2011        Past Surgical History:   Procedure Laterality Date    HX GI  1993    colon resection    HX HEENT      tonsillectomy in 1950    HX PACEMAKER      OK ENTEROSCOPY > 2ND PRTN ILEUM CONTROL BLEEDING  6/23/2011            Social History   Substance Use Topics    Smoking status: Never Smoker    Smokeless tobacco: Never Used    Alcohol use No        Family History   Problem Relation Age of Onset    Diabetes Mother     Thyroid Disease Mother     Heart Disease Mother     Hypertension Father     Heart Disease Father     Diabetes Sister     Diabetes Brother         Allergies   Allergen Reactions    Amoxicillin Unknown (comments)    Aricept [Donepezil] Other (comments)     Bad dreams.  Augmentin [Amoxicillin-Pot Clavulanate] Unknown (comments)    Pcn [Penicillins] Rash    Zithromax [Azithromycin] Unknown (comments)        Prior to Admission medications    Medication Sig Start Date End Date Taking? Authorizing Provider   SYNTHROID 75 mcg tablet TAKE 1 TABLET BY MOUTH ON  MONDAY THROUGH SATURDAY AND 1/2 TAB ON SUNDAY AT [facility] MEDICALLY NECESSARY 5/19/18   Jessica Cardenas MD   gabapentin (NEURONTIN) 300 mg capsule Take 2 caps in the morning and 1 cap at night 3/5/18   Historical Provider   escitalopram oxalate (LEXAPRO) 10 mg tablet TAKE 1 TABLET BY MOUTH  DAILY 4/23/18   Shanel Muller MD   traMADol Judithann Cheatham) 50 mg tablet Take 1 Tab by mouth every six (6) hours as needed for Pain. Max Daily Amount: 200 mg. Indications: Pain 3/24/18   Lyubov Flores DO   alendronate (FOSAMAX) 70 mg tablet TAKE 1 TABLET BY MOUTH  EVERY SUNDAY 1/11/18   Shanel Muller MD   atorvastatin (LIPITOR) 10 mg tablet Take 1 tablet by mouth  daily 7/10/17   Shanel Muller MD   co-enzyme Q-10 (CO Q-10) 100 mg capsule Take 200 mg by mouth daily. Historical Provider   Glucosamine &Chondroit-MV-Min3 297-924-73-0.5 mg tab Take 2 Tabs by mouth daily. Historical Provider   calcium-cholecalciferol, D3, (CALTRATE 600+D) tablet Take 2 Tabs by mouth daily. Historical Provider   vit B Cmplx 3-FA-Vit C-Biotin (NEPHRO BANDAR RX) 1- mg-mg-mcg tablet Take 1 Tab by mouth daily. Historical Provider   OMEGA-3 FATTY ACIDS/FISH OIL (OMEGA 3 FISH OIL PO) Take 1 Cap by mouth two (2) times a day. Historical Provider   POLYETHYLENE GLYCOL 3350 (MIRALAX PO) Take  by mouth.  1-2   tsp daily    Historical Provider   aspirin delayed-release 81 mg tablet Take 81 mg by mouth daily. Historical Provider       Review of symptoms:     POSITIVE= Bold  Negative = not bold  General:  fever, chills, sweats, generalized weakness, weight loss     loss of appetite   Eyes:    blurred vision, eye pain, double vision  ENT:    Coryza, sore throat, trouble swallowing  Respiratory:   cough, sputum, SOB  Cardiology:   chest pain, orthopnea, PND, edema  Gastrointestinal:  abdominal pain , N/V, diarrhea, constipation, melena or BRBPR  Genitourinary:  Urgency, dysuria, hematuria  Muskuloskeletal :  Joint redness, right leg pain, myalgias     Low back pain  Hematology:  easy bruising, nose or gum bleeding  Dermatological: rash, ulceration  Endocrine:   Polyuria or polydipsia, heat or hold intolerance  Neurological:  Headache, focal motor or sensory changes     Speech difficulties, memory loss  Psychological: depression, agitation      Objective:   VITALS:    Patient Vitals for the past 24 hrs:   Temp Pulse Resp BP SpO2   18 2111 - - - - 96 %   18 -  14 158/74 93 %   18 -  21 149/85 95 %   18 -  17 155/81 98 %   18 1930 - 75 18 152/81 95 %   18 1900 - 75 16 151/81 97 %   18 1745 - 75 20 (!) 172/97 96 %   18 1643 - - - (!) 188/95 -   18 1642 - 76 18 (!) 191/103 97 %   18 1537 98 °F (36.7 °C) 75 19 (!) 185/95 95 %     Temp (24hrs), Av °F (36.7 °C), Min:98 °F (36.7 °C), Max:98 °F (36.7 °C)    O2 Flow Rate (L/min): 2 l/min O2 Device: Nasal cannula    PHYSICAL EXAM:   General:    Alert, cooperative in no distress     HEENT: Normocephalic, atraumatic    PERRL, EOMI  Sclera no icterus    Nasal mucosa without masses or discharge  Hearing intact to voice    Oropharynx without erythema or exudate Pink MM  Neck:  No meningismus, trachea midline, no carotid bruits     Thyroid not enlarged, no nodules or tenderness  Lungs:   Clear to auscultation bilaterally.  No wheezing or rales    No accessory muscle use or retractions. Heart:   Decreased HS, RRR,  no murmur or gallop. No LE edema     Dorsal pedis, Posterior tibial and radial pulses 2+ and symmetric  Abdomen:   Soft, non-tender. Not distended. Bowel sounds normal.     No masses, No Hepatosplenomegaly, No Rebound or guarding  Lymph nodes: No cervical or inguinal NIKHIL  Musculoskeletal:  No Joint swelling, erythema, warmth. No Cyanosis or clubbing    Pain with moving right leg  Skin:      No rashes  N    Not Jaundiced   No nodules or thickening    Capillary refill normal  Neurologic: Alert, follows commands     Cranial nerves 2 to 12 intact    Symmetric motor strength bilaterally       LAB DATA REVIEWED:    Recent Results (from the past 12 hour(s))   EKG, 12 LEAD, INITIAL    Collection Time: 05/19/18  3:26 PM   Result Value Ref Range    Ventricular Rate 75 BPM    Atrial Rate 75 BPM    P-R Interval 236 ms    QRS Duration 146 ms    Q-T Interval 416 ms    QTC Calculation (Bezet) 464 ms    Calculated R Axis -56 degrees    Calculated T Axis 101 degrees    Diagnosis       AV dual-paced rhythm with prolonged AV conduction  Abnormal ECG  When compared with ECG of 23-MAR-2018 21:34,  Vent.  rate has increased BY   3 BPM     METABOLIC PANEL, BASIC    Collection Time: 05/19/18  4:52 PM   Result Value Ref Range    Sodium 132 (L) 136 - 145 mmol/L    Potassium 4.2 3.5 - 5.1 mmol/L    Chloride 98 97 - 108 mmol/L    CO2 32 21 - 32 mmol/L    Anion gap 2 (L) 5 - 15 mmol/L    Glucose 89 65 - 100 mg/dL    BUN 16 6 - 20 MG/DL    Creatinine 0.87 0.55 - 1.02 MG/DL    BUN/Creatinine ratio 18 12 - 20      GFR est AA >60 >60 ml/min/1.73m2    GFR est non-AA >60 >60 ml/min/1.73m2    Calcium 9.7 8.5 - 10.1 MG/DL   MAGNESIUM    Collection Time: 05/19/18  4:52 PM   Result Value Ref Range    Magnesium 2.1 1.6 - 2.4 mg/dL   TROPONIN I    Collection Time: 05/19/18  4:52 PM   Result Value Ref Range    Troponin-I, Qt. <0.04 <0.05 ng/mL   CBC W/O DIFF Collection Time: 05/19/18  6:50 PM   Result Value Ref Range    WBC 8.0 3.6 - 11.0 K/uL    RBC 4.17 3.80 - 5.20 M/uL    HGB 13.1 11.5 - 16.0 g/dL    HCT 38.7 35.0 - 47.0 %    MCV 92.8 80.0 - 99.0 FL    MCH 31.4 26.0 - 34.0 PG    MCHC 33.9 30.0 - 36.5 g/dL    RDW 13.7 11.5 - 14.5 %    PLATELET 987 780 - 858 K/uL    MPV 9.4 8.9 - 12.9 FL    NRBC 0.0 0  WBC    ABSOLUTE NRBC 0.00 0.00 - 0.01 K/uL       EKG as read by me shows AV dual-paced rhythm rate 75    Right tib-fib FINDINGS  2 views of the right tibial/fibula submitted for review. Chronic fracture of the proximal third of the fibula with callus formation with  questionable underlying acute component. No other fractures seen. No  dislocation. IMPRESSION:  Chronic mildly displaced fracture of the proximal third of the fibula with  callus formation may have an underlying acute component. No other fractures  seen. CXR read by radiology and reviewed by myself shows FINDINGS:  Frontal and lateral chest radiograph submitted for review. Stable cardiomediastinal silhouette. Stable left chest pacing device  No acute pulmonary process. No pleural effusion. No evidence for pneumothorax. Age indeterminant upper lumbar compression deformity  IMPRESSION:  No acute pulmonary process. Age indeterminant upper lumbar vertebral compression  deformity    CT scan L-spine results:  Clinical indication: Trauma, back pain. Axial CT scan the lumbar sacral spine obtained without contrast with coronal and  sagittal reconstructions, comparison to thousand 15, CT dose reduction was  achieved through the use of a standardized protocol tailored for this  examination and automatic exposure control for dose modulation . Naoma Broccoli There is a minimally displaced fracture of the superior endplate of L1 which is  acute. No significant canal compromise, minimal retropulsion of 10%.  No  significant paraspinal masses, other levels show osteopenia and DJD but no other  acute findings. Impression: Nondisplaced acute fracture superior endplate L1. I saw the patient personally, took a history and did a complete physical exam at the bedside. I performed complex decision making in coming up with a diagnostic and treatment plan for the patient. I reviewed the patient's past medical records, current laboratory and radiology results, and actual Xray films/EKG. I have also discussed this case with the involved ED physician.     Care Plan discussed with:    Patient,daughter, ED Doc    Risk of deterioration:  High    Total Time Coordinating Admission:  65   minutes    Total Critical Care Time:         Alison Moe MD

## 2018-05-20 NOTE — PROGRESS NOTES
Bedside and Verbal shift change report given to [place], RN (oncoming nurse) by Jerry Manzo RN (offgoing nurse). Report included the following information SBAR, Kardex, Intake/Output and MAR.

## 2018-05-21 NOTE — PROGRESS NOTES
INTERNAL MEDICINE ATTENDING NOTE    S: Ms. Filipe Woo was seen by me today during rounds. At this time, she is resting comfortably. She is a little anxious. Pain \"better\"; controlled except when moving. ROS otherwise unremarkable except as noted elsewhere. O: Blood pressure 184/84, pulse 75, temperature 97.7 °F (36.5 °C), resp. rate 18, height 5' 5\" (1.651 m), weight 171 lb 4.8 oz (77.7 kg), SpO2 93 %, not currently breastfeeding. Gen: Patient is in no acute distress. Lungs: CTAB. Heart: RRR. Abd: S, NT, ND, BS present. Extremities: Warm. No results found for this or any previous visit (from the past 12 hour(s)). R tib-fib xray: Chronic mildly displaced fracture of the proximal third of the fibula with callus formation may have an underlying acute component    XR chest PA and Lateral: No acute pulmonary process. Age indeterminant upper lumbar vertebral compression deformity    CT spine: Nondisplaced acute fracture superior endplate L1.      A / P:  1. Syncope and collapse (5/19/2018): Recurrent; has been following with cardiology, and prior work up with Dr. Nikunj Ken [facility] [place] [facility]) and Dr[facility] Nisa Shepherd at [facility] has been unrevealing. She has a pacemaker, with rate readjusted after syncopal spell in April. Cardiology consulted--pacemaker fine, echo fine. Cleared for discharge. 2. Low back pain, with acute non displaced fracture superior endplate L1 POA: Lidoderm patch, prn percocet. Orthopedics consulted. Recommend PT and WBAT. PT / OT consulted, recommend SNF but she declines this. 3. Right fibula fracture POA: Initial fracture after last syncope in April 2018/ Pain increased after fall. X-ray as above. Orthopedics. 4. HTN: Currently off meds due to syncope. BP increased at admit, may need to resume if persistent after pain control  5. Hypothyroidism POA continue current replacement  6. Neuropathy POA continue neurontin  7. Mild dementia POA  8.  Overweight POA Body mass index is 28.51 kg/(m^2). 9. DVT prophylaxis with lovenox  10. Code status: full code  11. D/C planning: Pt declines SNF. Home with home health / PT. We'll plan for tomorrow.       Ijeoma Mckay MD, FACP  Best contact is via Pager: [phone], or via hospital  at [phone]

## 2018-05-21 NOTE — PROGRESS NOTES
Reason for Admission: Syncope and Collapse               RRAT Score:    13   Do you (patient/family) have any concerns for transition/discharge? Pt did not express any concerns      Plan for utilizing home health:  Pt prefers [facility] services and wishes to discharge home      Likelihood of readmission? MODERATE as per RRAT score   Transition of Care Plan:          Pt is an 79 y/o  female admitted for syncope and collapse. Pt lives with spouse with PCG Monday thru Sunday from 7 am until 6 pm with some extended services on Sunday and Wednesday until 9:30 pm. [facility] are for spouse and pt assist with care for spouse. Pt lives in a one story home with three steps to the entrance of the residence. Pt is independent with ADLs and IADLs not to include driving. Pt has no previous HH or SNF providers. Pt has access to a cane, rolling walker, wheel chair, shower chair and BSC. Pt prefers to use [facility] on [address] [place] [address] and [facility]. Pt will be transported at discharge by family. CM met with pt to complete initial assessment with son at bedside. Pt is alert and oriented to person, place,time and situation. CM will continue to follow pt to assist with discharge plans as needed. Care Management Interventions  PCP Verified by CM: Yes (Dr. Aguila Jean Baptiste )  Mode of Transport at Discharge:  Other (see comment) (private vehicle )  Transition of Care Consult (CM Consult): Discharge Planning  Discharge Durable Medical Equipment: No (Pt has access to cane, rolling walker, wheelchair, shower chair and BSC )  Health Maintenance Reviewed: Yes  Physical Therapy Consult: Yes  Occupational Therapy Consult: Yes  Speech Therapy Consult: No  Current Support Network: Lives with Spouse, Has Personal Caregivers (Pt resides with spouse and has [facility] [place] from 7 am - 6 pm )  Confirm Follow Up Transport: Family  Plan discussed with Pt/Family/Caregiver: Yes  Discharge Location  Discharge Placement:  (TBD)    DOUGLAS Campbell, RYLAND  [facility] [address] [facility]   [phone]

## 2018-05-21 NOTE — PROGRESS NOTES
Bedside and Verbal shift change report given to Lynnae Phoenix, RN (oncoming nurse) by Graham Dover (offgoing nurse). Report included the following information SBAR, Kardex, MAR and Recent Results.

## 2018-05-21 NOTE — PROGRESS NOTES
Bedside and Verbal shift change report given to [address] [place] [address] (oncoming nurse) by Renata Lindsey (offgoing nurse). Report included the following information SBAR, Kardex, Intake/Output, MAR and Recent Results.

## 2018-05-21 NOTE — PROGRESS NOTES
Problem: Mobility Impaired (Adult and Pediatric)  Goal: *Acute Goals and Plan of Care (Insert Text)  Physical Therapy Goals  Initiated 5/21/2018  1. Patient will move from supine to sit and sit to supine , scoot up and down and roll side to side in bed with modified independence within 7 day(s). 2.  Patient will transfer from bed to chair and chair to bed with supervision/set-up using the least restrictive device within 7 day(s). 3.  Patient will perform sit to stand with supervision/set-up within 7 day(s). 4.  Patient will ambulate with supervision/set-up for 150 feet with the least restrictive device within 7 day(s). 5.  Patient will ascend/descend 4 stairs with DUAL handrail(s) with minimal assistance within 7 day(s). physical Therapy EVALUATION  Patient: Ciara Tariq (58 y.o. female)  Date: 5/21/2018  Primary Diagnosis: Syncope and collapse        Precautions:   WBAT, Fall    ASSESSMENT :  Based on the objective data described below, the patient presents with generalized weakness, pain, and impaired stability s/p admission for syncopal episode resulting in L1 fracture in addition to R fibular fracture. Patient mod-I PTA with use of RW for mobility as a community ambulator; she reports frequent syncopal events but no typical GLF. Received in supine and agreeable, on 2L NCO2 which she easily transitioned to RA from. Patient mobilized with S to EOB, where there forward required no more than CGA with exception of first stand requiring brief min A to ascend to upright. Patient safely utilized RW device with slowed but surprisingly step-through gait pattern. Noted multiple uncontrolled descent's to chair which she reports is baseline. Patient's gait speed at 0.23 m/s initially, improving over time. Of note, orthostatics obtained and VSS throughout session without subjective complaint of orthostatics symptoms (which she reports is typical - they present 'without warning').  Concluded in chair where covered role of pain management ahead. Given deficits seen today, as well as patient's fair home support system, return to home is not safely warranted at this time. She would do well with DC to a skilled rehabilitation environment, IP (will be able to tolerate 3hrs of therapy, fairly independent prior) vs SNF (if medical necessity is not met). Patient will benefit from skilled intervention to address the above impairments. Patients rehabilitation potential is considered to be Good  Factors which may influence rehabilitation potential include:   []         None noted  []         Mental ability/status  []         Medical condition  [x]         Home/family situation and support systems  []         Safety awareness  [x]         Pain tolerance/management  []         Other:      PLAN :  Recommendations and Planned Interventions:  [x]           Bed Mobility Training             []    Neuromuscular Re-Education  [x]           Transfer Training                   []    Orthotic/Prosthetic Training  [x]           Gait Training                         []    Modalities  [x]           Therapeutic Exercises           []    Edema Management/Control  [x]           Therapeutic Activities            [x]    Patient and Family Training/Education  []           Other (comment):    Frequency/Duration: Patient will be followed by physical therapy 5 times per week to address goals. Discharge Recommendations: IP Rehab vs SNF  Further Equipment Recommendations for Discharge: defer     SUBJECTIVE:   Patient stated I just don't know about that rehab.     OBJECTIVE DATA SUMMARY:   HISTORY:    Past Medical History:   Diagnosis Date    Acquired hypothyroidism 10/30/2017    Arthritis     Back pain 2/16/2011    Cancer ([facility] [address] 1993    colon    Chronic pain     in her back    Dementia 2011    Mild; Neuropsych testing with Dr. 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 They are home alone at night. She enjoys walking daily for exercise. Personal factors and/or comorbidities impacting plan of care: caregiver for spouse    Home Situation  Home Environment: Private residence  # Steps to Enter: 3  Rails to Enter: Yes  Hand Rails : Right  One/Two Story Residence: Two story (basement (goes down as needed with assist))  Living Alone: No  Support Systems:  (Day time paid caregiver for  or daughter)  Patient Expects to be Discharged to[de-identified] Private residence  Current DME Used/Available at Home: Cane, quad, Commode, bedside, Grab bars, Shower chair, Walker, rolling, Wheelchair, Lift chair  Tub or Shower Type: Tub/Shower combination    EXAMINATION/PRESENTATION/DECISION MAKING:   Critical Behavior:  Neurologic State: Alert     Cognition: Appropriate decision making  Safety/Judgement: Awareness of environment  Hearing:   Auditory  Auditory Impairment: Hard of hearing, bilateral  Range Of Motion:  AROM: Generally decreased, functional                       Strength:    Strength: Generally decreased, functional                  Vision:   Tracking: Able to track stimulus in all quadrants w/o difficulty  Corrective Lenses: Glasses (BIFOCALS UP TO DATE)  Functional Mobility:  Bed Mobility:  Rolling: Supervision (HOB at 35 degrees)  Supine to Sit: Supervision     Scooting: Supervision  Transfers:  Sit to Stand: Contact guard assistance;Minimum assistance  Stand to Sit: Contact guard assistance (initial uncontrolled descent - reports 'plop' at home )        Bed to Chair: Contact guard assistance (verbal cues for hand placement)              Balance:   Sitting: Intact; Without support  Standing: Impaired; With support (RW)  Standing - Static: Fair;Constant support  Standing - Dynamic : Fair  Ambulation/Gait Training:  Distance (ft): 65 Feet (ft)  Assistive Device: Walker, rolling;Gait belt  Ambulation - Level of Assistance: Contact guard assistance     Gait Description (WDL): Exceptions to WDL  Gait Abnormalities: Antalgic;Decreased step clearance (STEP THROUGH)  Right Side Weight Bearing: As tolerated           Speed/Meliza: Slow  Step Length: Right shortened;Left shortened        Interventions: Tactile cues; Verbal cues; Safety awareness training      Gait Speed:    Utilizing distance of 10 feet for test, as well as feet to meters calculation, patient ambulated at 0.23 m/s with self-selected gait speed. Ash R. \"Comfortable and maximum walking speed of adults aged 20-79 years: reference values and determinants. \" Age and Agin Volume 26(1):15-9. Harshil Enriquez. \"Age- and gender-related test performance in community-dwelling elderly people: Six-Minute Walk Test, Elam Balance Scale, Timed Up & Go Test, and gait speeds. \" Physical Therapy: 2002 Volume 82(2):128-37. Mahsa PEREZ, Polo HATCH, Osvaldo Cardenas JD, [facility] GERALDINE.  \"Assessing stability and change of four performance measures: a longitudinal study evaluating outcome following total hip and knee arthroplasty. \" [facility] Musculoskeletal Disorders: 2005 Volume 6(3). Serina Ravi, PhD; Salvador Zapien, PhD. Nikki Colón Paper: \"Walking Speed: the Sixth Vital Sign\" Journal of Geriatric Physical Therapy: 2009 - Volume 32 - Issue 2 - p 25 . Porfirio Montalvo DPT; Chuck Skinner PhD; Lucrecia Cooper PT PHD. Walking Speed: The Functional Vital Sign. Journal of Aging Phys Act 2015 April; 23(2):314-322. Functional Measure:  Barthel Index:    Bathin  Bladder: 0  Bowels: 10  Groomin  Dressin  Feeding: 10  Mobility: 10  Stairs: 5  Toilet Use: 5  Transfer (Bed to Chair and Back): 10  Total: 60       Barthel and G-code impairment scale:  Percentage of impairment CH  0% CI  1-19% CJ  20-39% CK  40-59% CL  60-79% CM  80-99% CN  100%   Barthel Score 0-100 100 99-80 79-60 59-40 20-39 1-19   0   Barthel Score 0-20 20 17-19 13-16 9-12 5-8 1-4 0      The Barthel ADL Index: Guidelines  1. The index should be used as a record of what a patient does, not as a record of what a patient could do. 2. The main aim is to establish degree of independence from any help, physical or verbal, however minor and for whatever reason. 3. The need for supervision renders the patient not independent. 4. A patient's performance should be established using the best available evidence. Asking the patient, friends/relatives and nurses are the usual sources, but direct observation and common sense are also important. However direct testing is not needed. 5. Usually the patient's performance over the preceding 24-48 hours is important, but occasionally longer periods will be relevant. 6. Middle categories imply that the patient supplies over 50 per cent of the effort. 7. Use of aids to be independent is allowed. Fayrene Back., Barthel, D.W. (0198). Functional evaluation: the Barthel Index. [facility] (14)2.   Anu Guevara INA WHITTEN. Trupti Diaz., Mag Love., Sonia Weaver (1999). Measuring the change indisability after inpatient rehabilitation; comparison of the responsiveness of the Barthel Index and Functional [place] Measure. Journal of Neurology, Neurosurgery, and Psychiatry, 66(4), 802-785. LIZZIE Walls, ARASELI Plasencia, & Maribel Lucero M.A. (2004.) Assessment of post-stroke quality of life in cost-effectiveness studies: The usefulness of the Barthel Index and the EuroQoL-5D. Quality of Life Research, 13, 417-83         G codes: In compliance with CMSs Claims Based Outcome Reporting, the following G-code set was chosen for this patient based on their primary functional limitation being treated: The outcome measure chosen to determine the severity of the functional limitation was the barthel with a score of 60/100 which was correlated with the impairment scale. ? Mobility - Walking and Moving Around:     - CURRENT STATUS: CJ - 20%-39% impaired, limited or restricted    - GOAL STATUS: CI - 1%-19% impaired, limited or restricted    - D/C STATUS:  ---------------To be determined---------------      Pain:  Pain Scale 1: Numeric (0 - 10)  Pain Intensity 1: 3  Pain Location 1: Back  Pain Orientation 1: Lower;Mid  Pain Description 1: Aching  Pain Intervention(s) 1: Medication (see MAR)  Activity Tolerance:   VSS negative orthostatics RA    Please refer to the flowsheet for vital signs taken during this treatment. After treatment:   [x]         Patient left in no apparent distress sitting up in chair  []         Patient left in no apparent distress in bed  [x]         Call bell left within reach  [x]         Nursing notified  []         Caregiver present  []         Bed alarm activated    COMMUNICATION/EDUCATION:   The patients plan of care was discussed with: Occupational Therapist, Registered Nurse and .   [x]         Fall prevention education was provided and the patient/caregiver indicated understanding. [x]         Patient/family have participated as able in goal setting and plan of care. [x]         Patient/family agree to work toward stated goals and plan of care. []         Patient understands intent and goals of therapy, but is neutral about his/her participation. []         Patient is unable to participate in goal setting and plan of care.     Thank you for this referral.  Krissy Padron, PT, DPT, CEEAA      Time Calculation: 26 mins

## 2018-05-21 NOTE — PROGRESS NOTES
5/21/2018 11:20 AM    Admit Date: 5/19/2018    Admit Diagnosis: Syncope and collapse    Subjective:     Deep Mt   denies chest pain, chest pressure/discomfort, dyspnea, palpitations, irregular heart beats, near-syncope, syncope, fatigue, orthopnea, paroxysmal nocturnal dyspnea, exertional chest pressure/discomfort.     Visit Vitals    /61 (BP 1 Location: Right arm, BP Patient Position: Sitting;Post activity)    Pulse 75    Temp 98.2 °F (36.8 °C)    Resp 18    Ht 5' 5\" (1.651 m)    Wt 171 lb 4.8 oz (77.7 kg)    SpO2 96%    Breastfeeding No    BMI 28.51 kg/m2     Current Facility-Administered Medications   Medication Dose Route Frequency    calcitonin (salmon) (MIACALCIN) nasal 1 Spray  1 Spray IntraNASal DAILY    sodium chloride (NS) flush 5-10 mL  5-10 mL IntraVENous Q8H    sodium chloride (NS) flush 5-10 mL  5-10 mL IntraVENous PRN    acetaminophen (TYLENOL) tablet 650 mg  650 mg Oral Q6H PRN    naloxone (NARCAN) injection 0.4 mg  0.4 mg IntraVENous PRN    ondansetron (ZOFRAN) injection 4 mg  4 mg IntraVENous Q4H PRN    bisacodyl (DULCOLAX) suppository 10 mg  10 mg Rectal DAILY PRN    enoxaparin (LOVENOX) injection 40 mg  40 mg SubCUTAneous Q24H    escitalopram oxalate (LEXAPRO) tablet 10 mg  10 mg Oral DAILY    atorvastatin (LIPITOR) tablet 10 mg  10 mg Oral QHS    calcium-vitamin D (OS-KAUSHIK) 500 mg-200 unit tablet  1 Tab Oral BID WITH MEALS    aspirin delayed-release tablet 81 mg  81 mg Oral DAILY    levothyroxine (SYNTHROID) tablet 75 mcg  75 mcg Oral 6am    polyethylene glycol (MIRALAX) packet 17 g  17 g Oral DAILY    oxyCODONE-acetaminophen (PERCOCET) 5-325 mg per tablet 1-2 Tab  1-2 Tab Oral Q6H PRN    lidocaine (LIDODERM) 5 % patch 1 Patch  1 Patch TransDERmal Q24H         Objective:      Visit Vitals    /61 (BP 1 Location: Right arm, BP Patient Position: Sitting;Post activity)    Pulse 75    Temp 98.2 °F (36.8 °C)    Resp 18    Ht 5' 5\" (1.651 m)    Wt 171 lb 4.8 oz (77.7 kg)    SpO2 96%    Breastfeeding No    BMI 28.51 kg/m2       Physical Exam:  Abdomen: soft, non-tender. Bowel sounds normal.   Extremities: no cyanosis or edema  Heart: regular rate and rhythm, S1, S2 normal, no murmur, click, rub or gallop  Lungs: clear to auscultation bilaterally  Neurologic: Grossly normal    Data Review:   Labs:  No results found for this or any previous visit (from the past 24 hour(s)). Assessment:     Active Problems:    Syncope and collapse (5/19/2018)        Plan:     -ve orthostasis. No events on pacer interrogation. F/u Echo. Fall precautions. No further cardiac work up for now. F/u with Dr. Carolina Manzanares.

## 2018-05-21 NOTE — PROGRESS NOTES
Eval complete and note to follow. Recommend home health with initial 24/7 assist at discharge.       Supine:  118/65  Vitals:    05/21/18 0834 05/21/18 0836 05/21/18 0841 05/21/18 0850   BP: 113/66 117/62 122/61    BP 1 Location: Right arm Right arm Right arm Right arm   BP Patient Position: Sitting Standing Sitting;Post activity Sitting;Post activity   Pulse: 75  75    Resp:       Temp:       SpO2: 92% 95% 95% 96%   Weight:       Height:

## 2018-05-21 NOTE — DISCHARGE INSTRUCTIONS
Fainting: Care Instructions  Your Care Instructions    When you faint, or pass out, you lose consciousness for a short time. A brief drop in blood flow to the brain often causes it. When you fall or lie down, more blood flows to your brain and you regain consciousness. Emotional stress, pain, or overheating-especially if you have been standing-can make you faint. In these cases, fainting is usually not serious. But fainting can be a sign of a more serious problem. Your doctor may want you to have more tests to rule out other causes. The treatment you need depends on the reason why you fainted. The doctor has checked you carefully, but problems can develop later. If you notice any problems or new symptoms, get medical treatment right away. Follow-up care is a key part of your treatment and safety. Be sure to make and go to all appointments, and call your doctor if you are having problems. It's also a good idea to know your test results and keep a list of the medicines you take. How can you care for yourself at home? · Drink plenty of fluids to prevent dehydration. If you have kidney, heart, or liver disease and have to limit fluids, talk with your doctor before you increase your fluid intake. When should you call for help? Call 911 anytime you think you may need emergency care. For example, call if:  ? · You have symptoms of a heart problem. These may include:  ¨ Chest pain or pressure. ¨ Severe trouble breathing. ¨ A fast or irregular heartbeat. ¨ Lightheadedness or sudden weakness. ¨ Coughing up pink, foamy mucus. ¨ Passing out. After you call 911, the  may tell you to chew 1 adult-strength or 2 to 4 low-dose aspirin. Wait for an ambulance. Do not try to drive yourself. ? · You have symptoms of a stroke. These may include:  ¨ Sudden numbness, tingling, weakness, or loss of movement in your face, arm, or leg, especially on only one side of your body. ¨ Sudden vision changes.   ¨ Sudden trouble speaking. ¨ Sudden confusion or trouble understanding simple statements. ¨ Sudden problems with walking or balance. ¨ A sudden, severe headache that is different from past headaches. ? · You passed out (lost consciousness) again. ? Watch closely for changes in your health, and be sure to contact your doctor if:  ? · You do not get better as expected. Where can you learn more? Go to http://vikash-maria g.info/. Enter L963 in the search box to learn more about \"Fainting: Care Instructions. \"  Current as of: 2017  Content Version: 11.4  © 0810-5017 [employer]. Care instructions adapted under license by [employer] (which disclaims liability or warranty for this information). If you have questions about a medical condition or this instruction, always ask your healthcare professional. [address] any warranty or liability for your use of this information. PATIENT DISCHARGE INSTRUCTIONS      PATIENT DISCHARGE INSTRUCTIONS    Nileshia Maria Del Rosario / [id number] : 3/6/1932    Admitted 2018 Discharged: 2018       · It is important that you take the medication exactly as they are prescribed. · Keep your medication in the bottles provided by the pharmacist and keep a list of the medication names, dosages, and times to be taken in your wallet. · Do not take other medications without consulting your doctor.      What to do at Home    Recommended Diet: Cardiac Diet    Recommended Activity: PT/OT per Home Health        Signed By: Catrachita Parra MD     May 21, 2018

## 2018-05-21 NOTE — DISCHARGE SUMMARY
Physician Discharge Summary     Patient ID:  Arcenio Faye  [id number]  7 y.o.  3/6/1932    Admit date: 5/19/2018    Discharge date and time: 5/22/2018    Admission Diagnoses: Syncope and collapse    Discharge Diagnoses:  Principal Diagnosis: Syncope                                             Active Problems:    Syncope and collapse (5/19/2018)       Consults: Cardiology and Orthopedic Surgery    Significant Diagnostic Studies:     R tib-fib xray: Chronic mildly displaced fracture of the proximal third of the fibula with callus formation may have an underlying acute component     XR chest PA and Lateral: No acute pulmonary process. Age indeterminant upper lumbar vertebral compression deformity     CT spine: Nondisplaced acute fracture superior endplate L1. Echo: was estimated to  be 60 %. No obvious wall motion abnormalities identified in the views  obtained. Hospital Course: Ms. Arcenio Faye is a patient of mine who presented with the problems above. See the initial H and P for full details. By problem. .. Syncope and collapse (5/19/2018): Recurrent; has been following with cardiology, and prior work up with Dr. Cherry Love [facility] [place] [facility]) and Dr. Wilbur Henry at [facility] has been unrevealing. She has a pacemaker, with rate readjusted after syncopal spell in April. Cardiology consulted--She was seen by Dr. Shirlene Hastings. Echo as above. Pacemaker interrogated and okay. Low back pain, with acute non displaced fracture superior endplate L1 POA: Lidoderm patch, prn percocet. Orthopedics consulted; from their note:     -Nondisplaced acute fracture superior endplate L1.  -Chronic mildly displaced fracture of the proximal third of the fibula with callus formation may have an underlying acute component. WBAT with walker  Nasal calcitonin   Office F/U with Jacinto Beni     Right fibula fracture POA: Initial fracture after last syncope in April 2018/ Pain increased after fall. X-ray as above.   Orthopedics to see.      HTN: Currently off meds due to syncope. BP increased at admit, may need to resume if persistent after pain control    Hypothyroidism POA continue current replacement    Neuropathy POA continue neurontin    Dementia POA. At present, the patient lacks capacity for medical decision-making--see note on discharge planning below. Her daughter, Rajat Lester, is medical Power of . Overweight POA Body mass index is 28.51 kg/(m^2). DVT prophylaxis with lovenox    Code status: full code    Discharge planning: PT and OT have recommended SNF or inpatient rehab; however, the patient insisted on going home. Her daughter, Rajat Lester, spoke with me by phone and was adamant that \"she can't come home, and she doesn't know what she's saying. \"  She felt that the patient is incapable of decision making and that as MPOA she should be able to override her mother's wish, and get her into rehab. We sought an opinion from Psychiatry on the issue of capacity and she was seen by Dr. Wilfrid Aguilar:     the patient does not understand the risk and benefit if accepting or refusing rehab therapy and does not sound able to take the right medical decision      We proceeded with placement short term for inpatient PT and OT, and she has been accepted at [facility].        Discharge Exam:  Visit Vitals    /84 (BP 1 Location: Right leg, BP Patient Position: Sitting)  Comment (BP Patient Position): eating lunch    Pulse 75    Temp 97.7 °F (36.5 °C)    Resp 18    Ht 5' 5\" (1.651 m)    Wt 171 lb 4.8 oz (77.7 kg)    SpO2 93%    Breastfeeding No    BMI 28.51 kg/m2      Unchanged.      Disposition: home    Patient Instructions:   Current Discharge Medication List      CONTINUE these medications which have NOT CHANGED    Details   SYNTHROID 75 mcg tablet TAKE 1 TABLET BY MOUTH ON  MONDAY THROUGH SATURDAY AND 1/2 TAB ON SUNDAY AT [facility] MEDICALLY NECESSARY  Qty: 85 Tab, Refills: 3      gabapentin (NEURONTIN) 300 mg capsule Take 2 caps in the morning and 1 cap at night      escitalopram oxalate (LEXAPRO) 10 mg tablet TAKE 1 TABLET BY MOUTH  DAILY  Qty: 90 Tab, Refills: 3      traMADol (ULTRAM) 50 mg tablet Take 1 Tab by mouth every six (6) hours as needed for Pain. Max Daily Amount: 200 mg. Indications: Pain  Qty: 10 Tab, Refills: 0    Associated Diagnoses: Closed nondisplaced comminuted fracture of shaft of right fibula, initial encounter      alendronate (FOSAMAX) 70 mg tablet TAKE 1 TABLET BY MOUTH  EVERY SUNDAY  Qty: 12 Tab, Refills: 3      atorvastatin (LIPITOR) 10 mg tablet Take 1 tablet by mouth  daily  Qty: 90 Tab, Refills: 3      co-enzyme Q-10 (CO Q-10) 100 mg capsule Take 200 mg by mouth daily. Glucosamine &Chondroit-MV-Min3 013-559-91-0.5 mg tab Take 2 Tabs by mouth daily. calcium-cholecalciferol, D3, (CALTRATE 600+D) tablet Take 2 Tabs by mouth daily. vit B Cmplx 3-FA-Vit C-Biotin (NEPHRO BANDAR RX) 1- mg-mg-mcg tablet Take 1 Tab by mouth daily. OMEGA-3 FATTY ACIDS/FISH OIL (OMEGA 3 FISH OIL PO) Take 1 Cap by mouth two (2) times a day. POLYETHYLENE GLYCOL 3350 (MIRALAX PO) Take  by mouth. 1-2   tsp daily      aspirin delayed-release 81 mg tablet Take 81 mg by mouth daily. Activity: PT/OT per Home Health  Diet: Cardiac Diet    Follow-up with Dr. Vasu Camp. Keep other specialty appointments. Signed:  Griselda Garrett MD  5/21/2018  4:42 PM    Note: Greater than 30 minutes were spent on activities related to this discharge.

## 2018-05-21 NOTE — PROGRESS NOTES
CM met with pt and advised of discharge order. CM asked pt for [facility] preference. Pt advised [facility]. CM sent referral to [address] [place] [address] for [facility] needs. CM updated AVS to reflect HH. SNF and Inpatient rehab are suggested for pt but discharge orders were in for pt and pt requires three midnight stay for SNF and no inpatient rehab referral was sent. CM sent referral to [address] [place] [address] via CC to accommodate pt to ensure services are referred for this discharge. Care Management Interventions  PCP Verified by CM: Yes (Dr. Sintia Oshea )  Mode of Transport at Discharge:  Other (see comment) (private vehicle )  Transition of Care Consult (CM Consult): Discharge Planning, [address]: Yes  Discharge Durable Medical Equipment: No (Pt has access to cane, rolling walker, wheelchair, shower chair and BSC )  Health Maintenance Reviewed: Yes  Physical Therapy Consult: Yes  Occupational Therapy Consult: Yes  Speech Therapy Consult: No  Current Support Network: Lives with Spouse, Has Personal Caregivers (Pt resides with spouse and has PCG M - [place] from 7 am - 6 pm )  Confirm Follow Up Transport: Family  Plan discussed with Pt/Family/Caregiver: Yes  Freedom of Choice Offered: Yes  Discharge Location  Discharge Placement: Home with home health    DOUGLAS Lemus, [employer]   [phone]

## 2018-05-21 NOTE — PROGRESS NOTES
Problem: Self Care Deficits Care Plan (Adult)  Goal: *Acute Goals and Plan of Care (Insert Text)  Occupational Therapy Goals:  Initiated 5/21/2018  1. Patient will perform grooming standing with modified independence within 7 days. 2. Patient will perform lower body dressing with modified independence within 7 days. 3. Patient will perform toileting with modified independence within 7 days. 4. Patient will perform one IADL task in standing with modified independence within 7 days. 5. Patient will transfer from toilet with modified independence using the least restrictive device and appropriate durable medical equipment within 7 days. Occupational Therapy EVALUATION  Patient: Emery Souza (16 y.o. female)  Date: 5/21/2018  Primary Diagnosis: Syncope and collapse; L1 compression fracture        Precautions:   WBAT, Fall    ASSESSMENT :  Based on the objective data described below, the patient presents with decreased independence with sit to stand and stand to sit from standard surface. CGA overall for transitions with occasional min assist from standard surface. Pt tends to not reach back to sit and plops. Pt was educated on the importance of reaching back to sit and slowly lowering herself. Pt was able to perform mobility to bathroom with RW with CGA. CGA for management of gown in standing. Unable to effectively reach LE to perform lower body dressing due to pain from compression fracture and ankle fracture on right. Pt is performing UB ADLS at a CGA level and lower body ADLS at a min assist level. Pt may benefit from AE training. Recommend SNF for short term rehab but pt may decline this.   If pt goes home recommend home health with 24/7 assist.  .        Supine:  118/65 ]           Vitals:     05/21/18 0834 05/21/18 0836 05/21/18 0841 05/21/18 0850   BP: 113/66 117/62 122/61     BP 1 Location: Right arm Right arm Right arm Right arm   BP Patient Position: Sitting Standing Sitting;Post activity Sitting;Post activity   Pulse: 75   75     Resp:           Temp:           SpO2: 92% 95% 95% 96%   Weight:           Height:                Patient will benefit from skilled intervention to address the above impairments. Patients rehabilitation potential is considered to be Good  Factors which may influence rehabilitation potential include:   [x]             None noted  []             Mental ability/status  []             Medical condition  []             Home/family situation and support systems  []             Safety awareness  []             Pain tolerance/management  []             Other:      PLAN :  Recommendations and Planned Interventions:  [x]               Self Care Training                  [x]        Therapeutic Activities  [x]               Functional Mobility Training    []        Cognitive Retraining  [x]               Therapeutic Exercises           []        Endurance Activities  [x]               Balance Training                   []        Neuromuscular Re-Education  []               Visual/Perceptual Training     [x]   Home Safety Training  [x]               Patient Education                 [x]        Family Training/Education  []               Other (comment):    Frequency/Duration: Patient will be followed by occupational therapy 4 times a week to address goals. Discharge Recommendations: Home Health with 24/7 assist vs. SNF if assist cannot be obtained  Further Equipment Recommendations for Discharge: none     SUBJECTIVE:   Patient stated I really don't want to go to rehab. Who is going to take care of my  at night. We only have help during the day.     OBJECTIVE DATA SUMMARY:   HISTORY:   Past Medical History:   Diagnosis Date    Acquired hypothyroidism 10/30/2017    Arthritis     Back pain 2/16/2011    Cancer ([address] 1993    colon    Chronic pain     in her back    Dementia 2011    Mild; Neuropsych testing with Dr. 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    S/P cardiac pacemaker procedure 8/24/2011    SSS (sick sinus syndrome) ([facility] 8/20/2011    Syncope     Syncope and collapse 8/19/2011    Urinary frequency 2/16/2011     Past Surgical History:   Procedure Laterality Date    HX GI  1993    colon resection    HX HEENT      tonsillectomy in 1950    HX PACEMAKER      WA ENTEROSCOPY > 2ND PRTN ILEUM CONTROL BLEEDING  6/23/2011            Prior Level of Function/Environment/Context: ambulated with rolling walker; history of syncopal episodes without warning; pts  has paid caregiver 7 days a week for part of the day; pts daughters check on her; sits on elevated surfaces and uses lift chair; pt reports that she \"plops to sit\" educated pt to reach back to sit; performed ADLs on her own and light IADLs    Expanded or extensive additional review of patient history:     Home Situation  Home Environment: Private residence  # Steps to Enter: 3  Rails to Enter: Yes  Hand Rails : Right  One/Two Story Residence: Two story (basement (goes down as needed with assist))  Living Alone: No  Support Systems:  (Day time paid caregiver for  or daughter)  Patient Expects to be Discharged to[de-identified] Private residence  Current DME Used/Available at Home: [facility], quad, Commode, bedside, Grab bars, Shower chair, Walker, rolling, Wheelchair, Lift chair  Tub or Shower Type: Tub/Shower combination  [x]  Right hand dominant   []  Left hand dominant    EXAMINATION OF PERFORMANCE DEFICITS:  Cognitive/Behavioral Status:  Neurologic State: Alert     Cognition: Appropriate decision making  Perception: Appears intact  Perseveration: No perseveration noted  Safety/Judgement: Awareness of environment        Hearing: Auditory  Auditory Impairment: Hard of hearing, bilateral    Vision/Perceptual:    Tracking: Able to track stimulus in all quadrants w/o difficulty                           Corrective Lenses: Glasses (BIFOCALS UP TO DATE)    Range of Motion:    AROM: Generally decreased, functional                         Strength:    Strength: Generally decreased, functional                Coordination:     Fine Motor Skills-Upper: Left Intact; Right Intact    Gross Motor Skills-Upper: Left Intact; Right Intact      Balance:  Sitting: Intact; Without support  Standing: Impaired; With support (RW)  Standing - Static: Fair;Constant support  Standing - Dynamic : Fair    Functional Mobility and Transfers for ADLs:  Bed Mobility:  Rolling: Supervision (HOB at 35 degrees)  Supine to Sit: Supervision  Scooting: Supervision    Transfers:  Sit to Stand: Contact guard assistance;Minimum assistance  Stand to Sit: Contact guard assistance (initial uncontrolled descent - reports 'plop' at home )  Bed to Chair: Contact guard assistance (verbal cues for hand placement)  Toilet Transfer : Contact guard assistance    ADL Assessment:  Feeding: Independent    Oral Facial Hygiene/Grooming: Contact guard assistance    Bathing: Minimum assistance (lower legs due to back pain/LE pain)    Upper Body Dressing: Setup    Lower Body Dressing: Minimum assistance    Toileting: Contact guard assistance                ADL Intervention and task modifications:  See assessment  Cognitive Retraining  Safety/Judgement: Awareness of environment      Functional Measure:  Barthel Index:    Bathin  Bladder: 0  Bowels: 10  Groomin  Dressin  Feeding: 10  Mobility: 10  Stairs: 5  Toilet Use: 5  Transfer (Bed to Chair and Back): 10  Total: 60       Barthel and G-code impairment scale:  Percentage of impairment CH  0% CI  1-19% CJ  20-39% CK  40-59% CL  60-79% CM  80-99% CN  100%   Barthel Score 0-100 100 99-80 79-60 59-40 20-39 1-19   0   Barthel Score 0-20 20 17-19 13-16 9-12 5-8 1-4 0      The Barthel ADL Index: Guidelines  1. The index should be used as a record of what a patient does, not as a record of what a patient could do. 2. The main aim is to establish degree of independence from any help, physical or verbal, however minor and for whatever reason. 3. The need for supervision renders the patient not independent. 4. A patient's performance should be established using the best available evidence. Asking the patient, friends/relatives and nurses are the usual sources, but direct observation and common sense are also important. However direct testing is not needed. 5. Usually the patient's performance over the preceding 24-48 hours is important, but occasionally longer periods will be relevant. 6. Middle categories imply that the patient supplies over 50 per cent of the effort. 7. Use of aids to be independent is allowed. Yolanda Johnson., Barthel, D.W. (1201). Functional evaluation: the Barthel Index. [facility] (14)2. Lula Libman [place], [email or web address].F, Abena Carranza., Maryan Brock., [facility], [facility] (1999). Measuring the change indisability after inpatient rehabilitation; comparison of the responsiveness of the Barthel Index and Functional [place] Measure. Journal of Neurology, Neurosurgery, and Psychiatry, 66(4), 952-290. Teresa Sarabia, [email or web address], ARASELI Plasencia, & Aylin Pineda MDezA. (2004.) Assessment of post-stroke quality of life in cost-effectiveness studies: The usefulness of the Barthel Index and the EuroQoL-5D. Quality of Life Research, 13, 342-84         G codes: In compliance with CMSs Claims Based Outcome Reporting, the following G-code set was chosen for this patient based on their primary functional limitation being treated:     The outcome measure chosen to determine the severity of the functional limitation was the barthel with a score of 60/100 which was correlated with the impairment scale. ? Self Care:     - CURRENT STATUS: CJ - 20%-39% impaired, limited or restricted    - GOAL STATUS: CI - 1%-19% impaired, limited or restricted    - D/C STATUS:  ---------------To be determined---------------     Occupational Therapy Evaluation Charge Determination   History Examination Decision-Making   MEDIUM Complexity : Expanded review of history including physical, cognitive and psychosocial  history  LOW Complexity : 1-3 performance deficits relating to physical, cognitive , or psychosocial skils that result in activity limitations and / or participation restrictions  MEDIUM Complexity : Patient may present with comorbidities that affect occupational performnce. Miniml to moderate modification of tasks or assistance (eg, physical or verbal ) with assesment(s) is necessary to enable patient to complete evaluation       Based on the above components, the patient evaluation is determined to be of the following complexity level: LOW   Pain:  Pain Scale 1: Numeric (0 - 10)  Pain Intensity 1: 3  Pain Location 1: Back  Pain Orientation 1: Lower;Mid  Pain Description 1: Aching  Pain Intervention(s) 1: Medication (see MAR)  Activity Tolerance:     Please refer to the flowsheet for vital signs taken during this treatment. After treatment:   [x] Patient left in no apparent distress sitting up in chair  [] Patient left in no apparent distress in bed  [x] Call bell left within reach  [x] Nursing notified  [] Caregiver present  [] Bed alarm activated    COMMUNICATION/EDUCATION:   The patients plan of care was discussed with: Physical Therapist, Registered Nurse and patient. [x] Home safety education was provided and the patient/caregiver indicated understanding. [x] Patient have participated as able in goal setting and plan of care. [x] Patient agree to work toward stated goals and plan of care.   [] Patient understands intent and goals of therapy, but is neutral about his/her participation. [] Patient is unable to participate in goal setting and plan of care. This patients plan of care is appropriate for delegation to EVA.     Thank you for this referral.  Antony Ying OTR/L  Time Calculation: 23 mins

## 2018-05-22 PROBLEM — S32.010A CLOSED COMPRESSION FRACTURE OF FIRST LUMBAR VERTEBRA (HCC): Status: ACTIVE | Noted: 2018-01-01

## 2018-05-22 NOTE — ROUTINE PROCESS
Hospital to [facility] SBAR Handoff - Petr Hernandez                                                                        80 y.o.   female    [address]   Room: [facility] 3 ORTHOPEDICS  Unit Phone# :  [phone]      [address]  [facility] [address]  [address] [address]  Dept: [phone]  Loc: [phone]                    SITUATION     Admitted:  5/19/2018         Attending Provider:  Elizabet Rao MD       Consultations:  IP CONSULT TO ORTHOPEDIC SURGERY  IP CONSULT TO CARDIOLOGY  IP CONSULT TO PSYCHIATRY    PCP:  Elizabet Rao MD   [phone]    Treatment Team: Attending Provider: Elizabet Rao MD; Consulting Provider: Shante Whitehead PA-C; Consulting Provider: Erik Prater MD; Consulting Provider: Wayne Gonzales MD; Consulting Provider: Karen Villa MD; Utilization Review: Adriana Machado RN; Utilization Review: Lebron Alcala; Care Manager: Laverneroxy Carvajal; Consulting Provider: Shana John MD    Admitting Dx:  Syncope and collapse       Principal Problem: <principal problem not specified>    * No surgery found * of      BY: * Surgery not found *             ON: * No surgery found *                  Code Status: Full Code                Advance Directives:   Advance Care Planning 5/20/2018   Patient's Healthcare Decision Maker is: Legal Next of Kin   Confirm Advance Directive Yes, not on file    (Send w/patient)   Yes Not W Pt       Isolation:  There are currently no Active Isolations       MDRO: No current active infections    Pain Medications given:  Percocet    Last dose: 5/22/2018 at  1500    Special Equipment needed: no  Type of equipment:         BACKGROUND     Allergies: Allergies   Allergen Reactions    Amoxicillin Unknown (comments)    Aricept [Donepezil] Other (comments)     Bad dreams.     Augmentin [Amoxicillin-Pot Clavulanate] Unknown (comments)    Pcn [Penicillins] Rash    Zithromax [Azithromycin] Unknown (comments)       Past Medical History:   Diagnosis Date    Acquired hypothyroidism 10/30/2017    Arthritis     Back pain 2/16/2011    Cancer ([facility] [address] 1993    colon    Chronic pain     in her back    Dementia 2011    Mild; Neuropsych testing with Dr. 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    S/P cardiac pacemaker procedure 8/24/2011    SSS (sick sinus syndrome) ([facility] [address] 8/20/2011    Syncope     Syncope and collapse 8/19/2011    Urinary frequency 2/16/2011       Past Surgical History:   Procedure Laterality Date    HX GI  1993    colon resection    HX HEENT      tonsillectomy in 1950    HX PACEMAKER      MT ENTEROSCOPY > 2ND PRTN ILEUM CONTROL BLEEDING  6/23/2011            Prescriptions Prior to Admission   Medication Sig    SYNTHROID 75 mcg tablet TAKE 1 TABLET BY MOUTH ON  MONDAY THROUGH SATURDAY AND 1/2 TAB ON SUNDAY AT BEDTIME--BRAND MEDICALLY NECESSARY    gabapentin (NEURONTIN) 300 mg capsule Take 2 caps in the morning and 1 cap at night    escitalopram oxalate (LEXAPRO) 10 mg tablet TAKE 1 TABLET BY MOUTH  DAILY    traMADol (ULTRAM) 50 mg tablet Take 1 Tab by mouth every six (6) hours as needed for Pain. Max Daily Amount: 200 mg. Indications: Pain    alendronate (FOSAMAX) 70 mg tablet TAKE 1 TABLET BY MOUTH  EVERY SUNDAY    atorvastatin (LIPITOR) 10 mg tablet Take 1 tablet by mouth  daily    co-enzyme Q-10 (CO Q-10) 100 mg capsule Take 200 mg by mouth daily.  Glucosamine &Chondroit-MV-Min3 717-007-83-0.5 mg tab Take 2 Tabs by mouth daily.     calcium-cholecalciferol, D3, (CALTRATE 600+D) tablet Take 2 Tabs by mouth daily.  vit B Cmplx 3-FA-Vit C-Biotin (NEPHRO BANDAR RX) 1- mg-mg-mcg tablet Take 1 Tab by mouth daily.  OMEGA-3 FATTY ACIDS/FISH OIL (OMEGA 3 FISH OIL PO) Take 1 Cap by mouth two (2) times a day.  POLYETHYLENE GLYCOL 3350 (MIRALAX PO) Take  by mouth. 1-2   tsp daily    aspirin delayed-release 81 mg tablet Take 81 mg by mouth daily. Hard scripts included in transfer packet no    Vaccinations:    Immunization History   Administered Date(s) Administered    Influenza High Dose Vaccine PF 10/30/2017    Influenza Vaccine 10/21/2013, 10/22/2014    Influenza Vaccine (Quad) 10/22/2015    Influenza Vaccine (Quad) PF 10/27/2016    Influenza Vaccine Split 10/03/2011, 10/09/2012    Influenza Vaccine Whole 10/01/2010    Pneumococcal Conjugate (PCV-13) 11/17/2015    Tdap 10/22/2015    ZZZ-RETIRED (DO NOT USE) Pneumococcal Vaccine (Unspecified Type) 10/01/2010       Readmission Risks:    Known Risks: none        The Charlson CoMorbitiy Index tool is an evidenced based tool that has more automatic generated information. The tool looks at many different items such as the age of the patient, how many times they were admitted in the last calendar year, current length of stay in the hospital and their diagnosis. All of these items are pulled automatically from information documented in the chart from various places and will generate a score that predicts whether a patient is at low (less than 13), medium (13-20) or high (21 or greater) risk of being readmitted.         ASSESSMENT                Temp: 98.4 °F (36.9 °C) (05/22/18 1544) Pulse (Heart Rate): 75 (05/22/18 1544)     Resp Rate: 16 (05/22/18 1544)           BP: 153/83 (05/22/18 1544)     O2 Sat (%): 97 % (05/22/18 1544)     Weight: 77.7 kg (171 lb 4.8 oz)    Height: 5' 5\" (165.1 cm) (05/19/18 1537)       If above not within 1 hour of discharge:    BP:_____  P:____  R:____ T:_____ O2 Sat: ___%  O2: ______    Active Orders Diet    DIET CARDIAC Regular         Orientation: oriented to time, place, person and situation and only aware of  time, place and person     Active Behaviors: None                                   Active Lines/Drains:  (Peg Tube / Tim / CL or S/L?): no    Urinary Status: Voiding     Last BM: Last Bowel Movement Date: 05/18/18                   Mobility: Very limited   Weight Bearing Status: WBAT (Weight Bearing as Tolerated)      Gait Training  Assistive Device: Walker, rolling, Gait belt  Ambulation - Level of Assistance: Contact guard assistance  Distance (ft): 65 Feet (ft)  Interventions: Tactile cues, Verbal cues, Safety awareness training         Lab Results   Component Value Date/Time    Glucose 84 05/20/2018 04:48 AM    Hemoglobin A1c 5.6 12/16/2016 02:01 AM    INR 1.1 12/24/2017 02:09 PM    INR 1.1 08/19/2011 02:10 PM    HGB 13.1 05/20/2018 04:48 AM    HGB 13.1 05/19/2018 06:50 PM        RECOMMENDATION     See After Visit Summary (AVS) for:  · Discharge instructions  · After [address]   · Special equipment needed (entered pre-discharge by Care Management)  · Medication Reconciliation    · Follow up Appointment(s)         Report given/sent by:  Sayda Hwang RN                    Verbal report given to:  Diaz Germain RN           Estimated discharge time:  5/22/2018 at 1730

## 2018-05-22 NOTE — PROGRESS NOTES
CM sent referral for dispo to [facility] as per attending consult. CM will await their determination on admission for pt. CM advised pt is medically ready to transfer today. Care Management Interventions  PCP Verified by CM: Yes (Dr. Myra Romero )  Mode of Transport at Discharge:  Other (see comment) ([facility]  )  Transition of Care Consult (CM Consult): Discharge Planning, Other [facility] )  [facility] - INPATIENT: Yes  Discharge Durable Medical Equipment: No (Pt has access to cane, rolling walker, wheelchair, shower chair and BSC )  Health Maintenance Reviewed: Yes  Physical Therapy Consult: Yes  Occupational Therapy Consult: Yes  Speech Therapy Consult: No  Current Support Network: Lives with Spouse, Has Personal Caregivers (Pt resides with spouse and has PCG M - [place] from 7 am - 6 pm )  Confirm Follow Up Transport: Family  Plan discussed with Pt/Family/Caregiver: Yes  Freedom of Choice Offered: Yes  Discharge Location  Discharge Placement: Rehab Unit Subacute    DOGULAS Uriarte, [employer]   [phone]

## 2018-05-22 NOTE — ROUTINE PROCESS
PCP ITZEL appt scheduled with Dr. Bc Sena on 5/30/18 at 10:45AM. Appt added to Marcela Easley, CM Specialist

## 2018-05-22 NOTE — PROGRESS NOTES
Pt has been approved for [facility] and will be in room 138. Call report number is [phone]. CM contacted attending to advise of approval for [facility]. CM spoke with Nicole Reynolds and she advise she will page attending to return call to CM. Care Management Interventions  PCP Verified by CM: Yes (Dr. Susi Lara )  Mode of Transport at Discharge:  Other (see comment) ([facility]  )  Transition of Care Consult (CM Consult): [facility]charge Planning, [facility] )  [address] Yes  Discharge Durable Medical Equipment: No (Pt has access to cane, rolling walker, wheelchair, shower chair and BSC )  Health Maintenance Reviewed: Yes  Physical Therapy Consult: Yes  Occupational Therapy Consult: Yes  Speech Therapy Consult: No  Current Support Network: Lives with Spouse, Has Personal Caregivers (Pt resides with spouse and has PCG M - [place] from 7 am - 6 pm )  Confirm Follow Up Transport: Family  Plan discussed with Pt/Family/Caregiver: Yes  Freedom of Choice Offered: Yes  Discharge Location  Discharge Placement: Rehab Unit Subacute    DOUGLAS Pastor, [employer]   [phone]

## 2018-05-22 NOTE — CONSULTS
PSYCHIATRIC CONSULTATION                         IDENTIFICATION:    Patient Name  Klaus Balderas   Date of Birth 3/6/1932   [facility] [id number]   Medical Record Number  [id number]      Age  80 y.o. PCP Ever Clark MD   Admit date:  5/19/2018    Room Number  3237/01  @ [facility]   Date of Service  5/22/2018            HISTORY         REASON FOR HOSPITALIZATION/CONSULTATION:  A psychiatric consultation was requested by Ever Clark MD to evaluate or provide advice/opinion related to evaluating for capacity   CC: \"back pain \"    HISTORY OF PRESENT ILLNESS:    The patient, Klaus Balderas, is a 80 y.o. WHITE [place]  female with a past psychiatric history significant for dementia  who was admitted to the medical floor for the treatment of falling and hurting her back , patient stated that she lives with her  and there is a nurse there for her  , she is hesitant about going to rehab and she does not sound understand the need to go , she has difficulty to focus and she has memories issus . She is not able to explain full understanding regarding her medical condition and the need for rehab therapy . Pt seen today for evaluation. ALLERGIES:  Allergies   Allergen Reactions    Amoxicillin Unknown (comments)    Aricept [Donepezil] Other (comments)     Bad dreams.     Augmentin [Amoxicillin-Pot Clavulanate] Unknown (comments)    Pcn [Penicillins] Rash    Zithromax [Azithromycin] Unknown (comments)      MEDICATIONS PRIOR TO ADMISSION:  Prescriptions Prior to Admission   Medication Sig    SYNTHROID 75 mcg tablet TAKE 1 TABLET BY MOUTH ON  MONDAY THROUGH SATURDAY AND 1/2 TAB ON SUNDAY AT [facility] MEDICALLY NECESSARY    gabapentin (NEURONTIN) 300 mg capsule Take 2 caps in the morning and 1 cap at night    escitalopram oxalate (LEXAPRO) 10 mg tablet TAKE 1 TABLET BY MOUTH  DAILY    traMADol (ULTRAM) 50 mg tablet Take 1 Tab by mouth every six (6) hours as needed for Pain. Max Daily Amount: 200 mg. Indications: Pain    alendronate (FOSAMAX) 70 mg tablet TAKE 1 TABLET BY MOUTH  EVERY SUNDAY    atorvastatin (LIPITOR) 10 mg tablet Take 1 tablet by mouth  daily    co-enzyme Q-10 (CO Q-10) 100 mg capsule Take 200 mg by mouth daily.  Glucosamine &Chondroit-MV-Min3 103-912-91-0.5 mg tab Take 2 Tabs by mouth daily.  calcium-cholecalciferol, D3, (CALTRATE 600+D) tablet Take 2 Tabs by mouth daily.  vit B Cmplx 3-FA-Vit C-Biotin (NEPHRO BANDAR RX) 1- mg-mg-mcg tablet Take 1 Tab by mouth daily.  OMEGA-3 FATTY ACIDS/FISH OIL (OMEGA 3 FISH OIL PO) Take 1 Cap by mouth two (2) times a day.  POLYETHYLENE GLYCOL 3350 (MIRALAX PO) Take  by mouth. 1-2   tsp daily    aspirin delayed-release 81 mg tablet Take 81 mg by mouth daily.       PAST MEDICAL HISTORY:  Past Medical History:   Diagnosis Date    Acquired hypothyroidism 10/30/2017    Arthritis     Back pain 2/16/2011    Cancer ([facility] [address] 1993    colon    Chronic pain     in her back    Dementia 2011    Mild; Neuropsych testing with Dr. 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    S/P cardiac pacemaker procedure 8/24/2011    SSS (sick sinus syndrome) ([facility] [address] 8/20/2011    Syncope     Syncope and collapse 8/19/2011    Urinary frequency 2/16/2011     Past Surgical History:   Procedure Laterality Date    HX GI  1993    colon resection    HX HEENT      tonsillectomy in 1950    HX PACEMAKER      CO ENTEROSCOPY > 2ND PRTN ILEUM CONTROL BLEEDING  6/23/2011           SOCIAL HISTORY:   Social History     Social History    Marital status:      Spouse name: N/A    Number of children: N/A    Years of education: N/A     Occupational History    Not on file. Social History Main Topics    Smoking status: Never Smoker    Smokeless tobacco: Never Used    Alcohol use No    Drug use: No    Sexual activity: Not on file     Other Topics Concern    Not on file     Social History Narrative    Lives in [facility] with  of 54 years. Has a son and daughter. Used to work as a  for [employer] and at [employer] firm. Likes to garden and craft. FAMILY HISTORY: History reviewed. No pertinent family history. Family History   Problem Relation Age of Onset    Diabetes Mother     Thyroid Disease Mother     Heart Disease Mother     Hypertension Father     Heart Disease Father     Diabetes Sister     Diabetes Brother        REVIEW of SYSTEMS:   History obtained from chart review and the patient  Pertinent items are noted in the History of Present Illness. All other Systems reviewed and are considered negative. MENTAL STATUS EXAM & VITALS       MENTAL STATUS EXAM (MSE):    MSE FINDINGS ARE WITHIN NORMAL LIMITS (WNL) UNLESS OTHERWISE STATED BELOW. Orientation not oriented to situation   Vital Signs (BP,Pulse, Temp) See below (reviewed 5/22/2018); vital signs are WNL if not listed below.    Gait and Station Stable, no ataxia   Abnormal Muscular Movements/Tone/Behavior No EPS, no Tardive Dyskinesia, no abnormal muscular movements; wnl tone   Relations cooperative   General Appearance:  age appropriate   Language No aphasia or dysarthria   Speech:  normal pitch and normal volume   Thought Processes logical, wnl rate of thoughts, good abstract reasoning and computation   Thought Associations logical   Thought Content free of delusions   Suicidal Ideations no plan  and no intention   Homicidal Ideations no plan  and no intention   Mood:  anxious   Affect:  full range   Memory recent  impaired   Memory remote: impaired   Concentration/Attention:  impaired   Fund of Knowledge average   Insight:  limited   Reliability fair   Judgment:  limited            VITALS:     Patient Vitals for the past 24 hrs:   Temp Pulse Resp BP SpO2   05/22/18 1100 98.6 °F (37 °C) 75 18 (!) 188/98 95 %   05/22/18 0838 98.5 °F (36.9 °C) 74 18 140/89 98 %   05/21/18 2353 98.6 °F (37 °C) 75 18 156/79 93 %   05/21/18 2000 98 °F (36.7 °C) 75 16 (!) 153/93 92 %   05/21/18 1500 98.5 °F (36.9 °C) 88 18 167/87 94 %              DATA     LABORATORY DATA:  Labs Reviewed   METABOLIC PANEL, BASIC - Abnormal; Notable for the following:        Result Value    Sodium 132 (*)     Anion gap 2 (*)     All other components within normal limits   METABOLIC PANEL, COMPREHENSIVE - Abnormal; Notable for the following:     BUN/Creatinine ratio 22 (*)     Albumin 3.0 (*)     A-G Ratio 0.9 (*)     All other components within normal limits   MAGNESIUM   URINALYSIS W/ RFLX MICROSCOPIC   CBC W/O DIFF   TROPONIN I   CBC WITH AUTOMATED DIFF   METABOLIC PANEL, BASIC   CBC W/O DIFF     Admission on 05/19/2018   Component Date Value Ref Range Status    Ventricular Rate 05/19/2018 75  BPM Final    Atrial Rate 05/19/2018 75  BPM Final    P-R Interval 05/19/2018 236  ms Final    QRS Duration 05/19/2018 146  ms Final    Q-T Interval 05/19/2018 416  ms Final    QTC Calculation (Bezet) 05/19/2018 464  ms Final    Calculated R Axis 05/19/2018 -56  degrees Final    Calculated T Axis 05/19/2018 101  degrees Final    Diagnosis 05/19/2018    Final                    Value:AV dual-paced rhythm with prolonged AV conduction  Abnormal ECG  When compared with ECG of 23-MAR-2018 21:34,  Vent.  rate has increased BY   3 BPM  Confirmed by Maurita Aj ([id number]) on 5/20/2018 3:32:48 PM      Sodium 05/19/2018 132* 136 - 145 mmol/L Final    Potassium 05/19/2018 4.2  3.5 - 5.1 mmol/L Final    Chloride 05/19/2018 98  97 - 108 mmol/L Final    CO2 05/19/2018 32  21 - 32 mmol/L Final    Anion gap 05/19/2018 2* 5 - 15 mmol/L Final    Glucose 05/19/2018 89  65 - 100 mg/dL Final    BUN 05/19/2018 16  6 - 20 MG/DL Final    Creatinine 05/19/2018 0.87  0.55 - 1.02 MG/DL Final    BUN/Creatinine ratio 05/19/2018 18  12 - 20   Final    GFR est AA 05/19/2018 >60  >60 ml/min/1.73m2 Final    GFR est non-AA 05/19/2018 >60  >60 ml/min/1.73m2 Final    Calcium 05/19/2018 9.7  8.5 - 10.1 MG/DL Final    Magnesium 05/19/2018 2.1  1.6 - 2.4 mg/dL Final    Color 05/20/2018 YELLOW/STRAW    Final    Appearance 05/20/2018 CLEAR  CLEAR   Final    Specific gravity 05/20/2018 1.010  1.003 - 1.030   Final    pH (UA) 05/20/2018 7.5  5.0 - 8.0   Final    Protein 05/20/2018 NEGATIVE   NEG mg/dL Final    Glucose 05/20/2018 NEGATIVE   NEG mg/dL Final    Ketone 05/20/2018 NEGATIVE   NEG mg/dL Final    Bilirubin 05/20/2018 NEGATIVE   NEG   Final    Blood 05/20/2018 NEGATIVE   NEG   Final    Urobilinogen 05/20/2018 0.2  0.2 - 1.0 EU/dL Final    Nitrites 05/20/2018 NEGATIVE   NEG   Final    Leukocyte Esterase 05/20/2018 NEGATIVE   NEG   Final    WBC 05/19/2018 8.0  3.6 - 11.0 K/uL Final    RBC 05/19/2018 4.17  3.80 - 5.20 M/uL Final    HGB 05/19/2018 13.1  11.5 - 16.0 g/dL Final    HCT 05/19/2018 38.7  35.0 - 47.0 % Final    MCV 05/19/2018 92.8  80.0 - 99.0 FL Final    MCH 05/19/2018 31.4  26.0 - 34.0 PG Final    MCHC 05/19/2018 33.9  30.0 - 36.5 g/dL Final    RDW 05/19/2018 13.7  11.5 - 14.5 % Final    PLATELET 87/05/7771 553  150 - 400 K/uL Final    MPV 05/19/2018 9.4  8.9 - 12.9 FL Final    NRBC 05/19/2018 0.0  0  WBC Final    ABSOLUTE NRBC 05/19/2018 0.00  0.00 - 0.01 K/uL Final    Troponin-I, Qt. 05/19/2018 <0.04  <0.05 ng/mL Final    Sodium 05/20/2018 136  136 - 145 mmol/L Final    Potassium 05/20/2018 3.9  3.5 - 5.1 mmol/L Final    Chloride 05/20/2018 102  97 - 108 mmol/L Final    CO2 05/20/2018 28  21 - 32 mmol/L Final    Anion gap 05/20/2018 6  5 - 15 mmol/L Final    Glucose 05/20/2018 84  65 - 100 mg/dL Final    BUN 05/20/2018 16  6 - 20 MG/DL Final    Creatinine 05/20/2018 0.74  0.55 - 1.02 MG/DL Final    BUN/Creatinine ratio 05/20/2018 22* 12 - 20   Final    GFR est AA 05/20/2018 >60  >60 ml/min/1.73m2 Final    GFR est non-AA 05/20/2018 >60  >60 ml/min/1.73m2 Final    Calcium 05/20/2018 9.3  8.5 - 10.1 MG/DL Final    Bilirubin, total 05/20/2018 0.8  0.2 - 1.0 MG/DL Final    ALT (SGPT) 05/20/2018 25  12 - 78 U/L Final    AST (SGOT) 05/20/2018 25  15 - 37 U/L Final    Alk. phosphatase 05/20/2018 71  45 - 117 U/L Final    Protein, total 05/20/2018 6.5  6.4 - 8.2 g/dL Final    Albumin 05/20/2018 3.0* 3.5 - 5.0 g/dL Final    Globulin 05/20/2018 3.5  2.0 - 4.0 g/dL Final    A-G Ratio 05/20/2018 0.9* 1.1 - 2.2   Final    WBC 05/20/2018 6.5  3.6 - 11.0 K/uL Final    RBC 05/20/2018 4.18  3.80 - 5.20 M/uL Final    HGB 05/20/2018 13.1  11.5 - 16.0 g/dL Final    HCT 05/20/2018 38.6  35.0 - 47.0 % Final    MCV 05/20/2018 92.3  80.0 - 99.0 FL Final    MCH 05/20/2018 31.3  26.0 - 34.0 PG Final    MCHC 05/20/2018 33.9  30.0 - 36.5 g/dL Final    RDW 05/20/2018 13.6  11.5 - 14.5 % Final    PLATELET 87/25/6704 364  150 - 400 K/uL Final    MPV 05/20/2018 9.6  8.9 - 12.9 FL Final    NRBC 05/20/2018 0.0  0  WBC Final    ABSOLUTE NRBC 05/20/2018 0.00  0.00 - 0.01 K/uL Final    NEUTROPHILS 05/20/2018 72  32 - 75 % Final    LYMPHOCYTES 05/20/2018 16  12 - 49 % Final    MONOCYTES 05/20/2018 7  5 - 13 % Final    EOSINOPHILS 05/20/2018 4  0 - 7 % Final    BASOPHILS 05/20/2018 0  0 - 1 % Final    IMMATURE GRANULOCYTES 05/20/2018 0  0.0 - 0.5 % Final    ABS. NEUTROPHILS 05/20/2018 4.7  1.8 - 8.0 K/UL Final    ABS. LYMPHOCYTES 05/20/2018 1.1  0.8 - 3.5 K/UL Final    ABS. MONOCYTES 05/20/2018 0.5  0.0 - 1.0 K/UL Final    ABS. EOSINOPHILS 05/20/2018 0.2  0.0 - 0.4 K/UL Final    ABS. BASOPHILS 05/20/2018 0.0  0.0 - 0.1 K/UL Final    ABS. IMM.  GRANS. 05/20/2018 0.0  0.00 - 0.04 K/UL Final    DF 05/20/2018 AUTOMATED    Final        RADIOLOGY REPORTS:    Results from Hospital Encounter encounter on 05/19/18   XR TIB/FIB RT   Narrative history: Fall with acute right lower leg pain    COMPARISON: 3/23/2018    FINDINGS:    2 views of the right tibial/fibula submitted for review. Chronic fracture of the proximal third of the fibula with callus formation with  questionable underlying acute component. No other fractures seen. No  dislocation. Impression IMPRESSION:    Chronic mildly displaced fracture of the proximal third of the fibula with  callus formation may have an underlying acute component. No other fractures  seen. Xr Chest Pa Lat    Result Date: 5/19/2018  history: Cough and syncope COMPARISON: 3/23/2018 FINDINGS: Frontal and lateral chest radiograph submitted for review. Stable cardiomediastinal silhouette. Stable left chest pacing device No acute pulmonary process. No pleural effusion. No evidence for pneumothorax. Age indeterminant upper lumbar compression deformity     IMPRESSION: No acute pulmonary process. Age indeterminant upper lumbar vertebral compression deformity    Xr Shoulder Rt Ap/lat Min 2 V    Result Date: 3/12/2018  EXAM:  XR SHOULDER RT AP/LAT MIN 2 V INDICATION:   Fall; eval for fracture COMPARISON: CT left shoulder 5/13/2015. FINDINGS: Two views of the right shoulder demonstrate no fracture, dislocation, effusion or other acute abnormality. There is osteopenia and glenohumeral and acromioclavicular degenerative marginal ossified formation. Soft tissues and underlying lungs appear unremarkable. IMPRESSION: No identified fracture. Osteoarthritis and osteopenia. Xr Tib/fib Rt    Result Date: 5/19/2018  history: Fall with acute right lower leg pain COMPARISON: 3/23/2018 FINDINGS: 2 views of the right tibial/fibula submitted for review.  Chronic fracture of the proximal third of the fibula with callus formation with questionable underlying acute component. No other fractures seen. No dislocation. IMPRESSION: Chronic mildly displaced fracture of the proximal third of the fibula with callus formation may have an underlying acute component. No other fractures seen. Xr Tib/fib Rt    Result Date: 3/23/2018  EXAM:  CR tibia fibula 2 views right INDICATION:  Right lower extremity pain after fall COMPARISON: None. TECHNIQUE: Frontal and lateral views of the right tibia/fibula. FINDINGS: There is an acute comminuted nondisplaced fracture at the proximal fibular diaphysis. There is no other acute bony abnormality. There is mild degenerative change in the knee. The soft tissues are unremarkable. IMPRESSION: Acute comminuted nondisplaced proximal fibular diaphyseal fracture. Xr Ankle Rt Min 3 V    Result Date: 3/24/2018  EXAM:  XR ANKLE RT MIN 3 V INDICATION:  Right ankle pain after injury. COMPARISON: None. FINDINGS: 5 views of the right ankle including 2 standing views demonstrate no fracture or disruption of the ankle mortise. There is no other acute osseous or articular abnormality. The soft tissues are within normal limits. IMPRESSION: No acute abnormality. Ct Head Wo Cont    Result Date: 3/23/2018  EXAM:  CT HEAD WO CONT INDICATION:   Patient at home and fell in the kitchen. Does not remember fall. Henrry Price 3 weeks ago but did not come to the hospital. History of more remote falls. COMPARISON: 12/24/2017. CONTRAST:  None. TECHNIQUE: Unenhanced CT of the head was performed using 5 mm images. Brain and bone windows were generated. CT dose reduction was achieved through use of a standardized protocol tailored for this examination and automatic exposure control for dose modulation. FINDINGS: Generalized prominence of cerebral sulci and ventricles is mildly increased but commensurate with age. Periventricular white matter low-density is also mild and stable. . .  There is no intracranial hemorrhage, extra-axial collection, mass, mass effect or midline shift. The basilar cisterns are open. No acute infarct is identified. The bone windows demonstrate no abnormalities. The left maxillary sinus is completely opacified but stable. There are no air-fluid levels. .     IMPRESSION: 1. No acute intracranial abnormality. 2. Paranasal sinus inflammatory disease, stable without air-fluid levels. Ct Spine Lumb Wo Cont    Result Date: 5/19/2018  Clinical indication: Trauma, back pain. Axial CT scan the lumbar sacral spine obtained without contrast with coronal and sagittal reconstructions, comparison to thousand 15, CT dose reduction was achieved through the use of a standardized protocol tailored for this examination and automatic exposure control for dose modulation . Fidel Saunas There is a minimally displaced fracture of the superior endplate of L1 which is acute. No significant canal compromise, minimal retropulsion of 10%. No significant paraspinal masses, other levels show osteopenia and DJD but no other acute findings. impression: Nondisplaced acute fracture superior endplate L1. Xr Chest Port    Result Date: 3/23/2018  INDICATION:  syncope EXAM: Chest single view. COMPARISON: 12/24/2017. FINDINGS: A single frontal view of the chest at 2142 hours shows clear lungs. The heart, mediastinum and pulmonary vasculature are stable . Transvenous pacemaker from the left is stable. Mild elevation of the right hemidiaphragm is stable. .  The bony thorax is unremarkable for age, with a compression deformity stable in the lower thoracic spine. . . IMPRESSION: No acute cardiopulmonary disease radiographically. .  .               MEDICATIONS       ALL MEDICATIONS  Current Facility-Administered Medications   Medication Dose Route Frequency    guaiFENesin-dextromethorphan (ROBITUSSIN DM) 100-10 mg/5 mL syrup 5 mL  5 mL Oral Q6H PRN    calcitonin (salmon) (MIACALCIN) nasal 1 Spray  1 Spray IntraNASal DAILY    sodium chloride (NS) flush 5-10 mL  5-10 mL IntraVENous Q8H    sodium chloride (NS) flush 5-10 mL  5-10 mL IntraVENous PRN    acetaminophen (TYLENOL) tablet 650 mg  650 mg Oral Q6H PRN    naloxone (NARCAN) injection 0.4 mg  0.4 mg IntraVENous PRN    ondansetron (ZOFRAN) injection 4 mg  4 mg IntraVENous Q4H PRN    bisacodyl (DULCOLAX) suppository 10 mg  10 mg Rectal DAILY PRN    enoxaparin (LOVENOX) injection 40 mg  40 mg SubCUTAneous Q24H    escitalopram oxalate (LEXAPRO) tablet 10 mg  10 mg Oral DAILY    atorvastatin (LIPITOR) tablet 10 mg  10 mg Oral QHS    calcium-vitamin D (OS-KAUSHIK) 500 mg-200 unit tablet  1 Tab Oral BID WITH MEALS    aspirin delayed-release tablet 81 mg  81 mg Oral DAILY    levothyroxine (SYNTHROID) tablet 75 mcg  75 mcg Oral 6am    polyethylene glycol (MIRALAX) packet 17 g  17 g Oral DAILY    oxyCODONE-acetaminophen (PERCOCET) 5-325 mg per tablet 1-2 Tab  1-2 Tab Oral Q6H PRN    lidocaine (LIDODERM) 5 % patch 1 Patch  1 Patch TransDERmal Q24H      SCHEDULED MEDICATIONS  Current Facility-Administered Medications   Medication Dose Route Frequency    calcitonin (salmon) (MIACALCIN) nasal 1 Spray  1 Spray IntraNASal DAILY    sodium chloride (NS) flush 5-10 mL  5-10 mL IntraVENous Q8H    enoxaparin (LOVENOX) injection 40 mg  40 mg SubCUTAneous Q24H    escitalopram oxalate (LEXAPRO) tablet 10 mg  10 mg Oral DAILY    atorvastatin (LIPITOR) tablet 10 mg  10 mg Oral QHS    calcium-vitamin D (OS-KAUSHIK) 500 mg-200 unit tablet  1 Tab Oral BID WITH MEALS    aspirin delayed-release tablet 81 mg  81 mg Oral DAILY    levothyroxine (SYNTHROID) tablet 75 mcg  75 mcg Oral 6am    polyethylene glycol (MIRALAX) packet 17 g  17 g Oral DAILY    lidocaine (LIDODERM) 5 % patch 1 Patch  1 Patch TransDERmal Q24H                ASSESSMENT & PLAN        The patient Kassandra Germain is a 80 y.o.  female who presents at this time for treatment of the following diagnoses:  Patient Active Hospital Problem List:           Assessment: the patient does not understand the risk and benefit if accepting or refusing rehab therapy and does not sound able to take the right medical decision     Plan:please make family involve in medical care and decision       Thank you very much for the opportunity to participate in the care of your patient. I will continue to follow up with patient as deemed appropriate. I have reviewed admission (and previous/old) labs and medical tests in the EHR and or transferring hospital documents. I will continue to order blood tests/labs and diagnostic tests as deemed appropriate and review results as they become available (see orders for details). I have reviewed old psychiatric and medical records available in the EHR. I Will order additional psychiatric records from other institutions to further elucidate the nature of patient's psychopathology and review once available. I will gather additional collateral information from friends, family and o/p treatment team to further elucidate the nature of patient's psychopathology and baselline level of psychiatric functioning.                      SIGNED:    lEi Harris MD  5/22/2018

## 2018-05-22 NOTE — PROGRESS NOTES
0700: Report received from Aditi, [address] [facility] 23, ED SUMMARY, [facility], RECENT RESULTS were discussed. Erik Smith, RN    [phone]: Notified daughter that pt will be transferred to [facility] room 138 today.

## 2018-05-22 NOTE — PROGRESS NOTES
Bedside and Verbal shift change report given to Lindsey Pereira RN (oncoming nurse) by Inna Avalos (offgoing nurse). Report included the following information SBAR, Kardex, Procedure Summary, Intake/Output, MAR, Accordion, Recent Results and Med Rec Status.

## 2018-05-22 NOTE — PROGRESS NOTES
INTERNAL MEDICINE ATTENDING NOTE    D/C planning: Pt declines SNF or Inpatient Rehab, but may lack capacity for medical decision making. I had a discussion with her daughter, Nicola Ordaz, who is adamant that \"she can't come home and she doesn't know what she's saying. \"  She feels that the patient is incapable of decision making and that as MPOA she should be able to override her mother's wish, and get her into rehab. We'll seek an opinion from Psychiatry on the issue of capacity. Addendum:   I have reviewed Dr. Jamey Brewster opinion:   the patient does not understand the risk and benefit if accepting or refusing rehab therapy and does not sound able to take the right medical decision     I concur. Decision-making passes to her mPOA, Nicola Ordaz. We will proceed with placement short term for inpatient PT and OT, and she has been accepted at [address] I have again discussed by phone with Nicola Ordaz, and she approves this plan.      Orlando Lazcano MD, FACP  Best contact is via Pager: [phone], or via hospital  at [phone]

## 2018-05-22 NOTE — PROGRESS NOTES
CM spoke with attending and advised that psychiatrist had seen pt and the findings were as follows:     Assessment: the patient does not understand the risk and benefit if accepting or refusing rehab therapy and does not sound able to take the right medical decision      Plan:please make family involve in medical care and decision     Attending advised that pt is good to transfer to [facility] for rehab and for CM to contact pt daughter back. CM spoke with pt daughter, Fred Share ([phone]) regarding disposition and advised pt has been accepted to [facility]. CM was advised by Ms. Coleman that she is glad pt is not able to return home at this moment and will benefit from therapy. Care Management Interventions  PCP Verified by CM: Yes (Dr. Aguila Jean Baptiste )  Mode of Transport at Discharge:  Other (see comment) ([facility]  )  Transition of Care Consult (CM Consult): Discharge Planning, Other [facility] )  [address]: Yes  Discharge Durable Medical Equipment: No (Pt has access to cane, rolling walker, wheelchair, shower chair and BSC )  Health Maintenance Reviewed: Yes  Physical Therapy Consult: Yes  Occupational Therapy Consult: Yes  Speech Therapy Consult: No  Current Support Network: Lives with Spouse, Has Personal Caregivers (Pt resides with spouse and has PCG M - Erickson from 7 am - 6 pm )  Confirm Follow Up Transport: Family  Plan discussed with Pt/Family/Caregiver: Yes  Freedom of Choice Offered: Yes  Discharge Location  Discharge Placement: Rehab Unit Subacute     DOUGLAS Campbell, [address] [facility]   [phone]

## 2018-05-22 NOTE — PROGRESS NOTES
Daughter called expressing concerns about patient not going home with new pain medications, going home and possibly needing 24 hour care and with the ability of patient being able to make decisions on her own. Informed her that I would call Dr Jonathon Short and talk to the  and stated they would get in touch with her. Spoke with Dr Jonathon Short and provided contact information for Simone Dacosta.  Informed Carisa  of update

## 2018-05-22 NOTE — PROGRESS NOTES
Pt has been authorized for [facility] via [facility]. CM updated AVS. CM provided pt with second IM letter and placed copy on bedside chart. Care Management Interventions  PCP Verified by CM: Yes (Dr. Geno Boyer )  Mode of Transport at Discharge:  Other (see comment) (private vehicle )  Transition of Care Consult (CM Consult): Discharge Planning, [address]: Yes  Discharge Durable Medical Equipment: No (Pt has access to cane, rolling walker, wheelchair, shower chair and BSC )  Health Maintenance Reviewed: Yes  Physical Therapy Consult: Yes  Occupational Therapy Consult: Yes  Speech Therapy Consult: No  Current Support Network: Lives with Spouse, Has Personal Caregivers (Pt resides with spouse and has PCG M - Erickson from 7 am - 6 pm )  Confirm Follow Up Transport: Family  Plan discussed with Pt/Family/Caregiver: Yes  Freedom of Choice Offered: Yes  Discharge Location  Discharge Placement: Home with home health    DOUGLAS Fregoso, [address] [place] [address]   [phone]

## 2018-05-23 NOTE — PROGRESS NOTES
Nurse Navigator Note- patient to ED [facility] 5/19-5/22 with syncope and collapse. Patient also has a L1 compression FX, patient then to [facility]. Called and LMTCB with Maryan Wood CM at [facility] who is out of office today per her VM.  [phone].      5/24/2018 11:33am Called [facility] and spoke to [facility], we did review some of patient medications that were on her list to give on this shift. Asked that she fax patient current medication list to me.      5/25/2018  2:23 pm  Called and LMTCB for Maryan Wood CM at [facility]. [address] [place] [address]  I then called patient nurse who was over whelmed with me calling and her passing medications. Asked that I call her back in 5 minutes at [phone].    2:41pm  Maryan Wood CM returned my call, she will fax me patient medication list and she will not know anything about D/C plan or date until team meeting on tuesday. 5/29/2018  8:36am  Received Fax from [facility] with current medication list, medication reconcilation completed.    [facility]

## 2018-05-24 NOTE — H&P
ATTENDING PHYSICIAN: Dr. Denise Rios: Dr. Austen Alas: Dr. Natalee Qureshi: [facility]  Orthopedist: Dr. Arnie Morales  Psychiatry Dr Lalitha Hinojosa  Cardiologist: [facility] cardiology    [address] [place] [address] Drive:       Multiple fractures including right fibula and L1 with general debility and syncope     HISTORY OF PRESENT ILLNESS:      80 Y. Kandice Machado admitted to [facility] emergency room with  Known recurrent syncope over past year  Seen By Dr Brown Members and then Dr Wesley Aparicio at [facility], daughter unaware of etiology, ? Vasovagal  Eventually had pacemaker placed  Recurrent syncope end of April 2018, Pacemaker rate increased from 65 to 75  Fractured her right fibula, mild pain at area, able to walk     On the day of admission, patient was walking at home and  suddenly passed out and woke up on floor  Woke up quickly, a bit groggy initially, then back to baseline  Pain in low back and increased pain in right leg  Got up and took some tylenol, but pain in lumbar spine and right calve increased to severe  Not able to get up and walk, came to ED  No HA or head trauma or SOB or CP or N/V or diarrhea     ED HD stable  Acute L1 compression fracture, ? Increased fracture at right fibula  Not able to ambulate    Postadmission hospital course   prior work up with Dr. Brown Members [facility]) and Dr. Wesley Aparicio at [facility] has been unrevealing. She has a pacemaker, with rate readjusted after syncopal spell in April. Cardiology consulted--She was seen by Dr. Vincente Sicard. Echo as above. Pacemaker interrogated and okay. Low back pain, with acute non displaced fracture superior endplate L1 POA: Lidoderm patch, prn percocet.   Orthopedics consulted; from their note:      -Nondisplaced acute fracture superior endplate L1.  -Chronic mildly displaced fracture of the proximal third of the fibula with callus formation may have an underlying acute component. WBAT with walker  Nasal calcitonin   Office F/U with Chen Cowing      Right fibula fracture POA: Initial fracture after last syncope in April 2018/ Pain increased after fall. X-ray as above. Orthopedics to see. HTN: Currently off meds due to syncope. BP increased at admit, may need to resume if persistent after pain control     Hypothyroidism POA continue current replacement     Neuropathy POA continue neurontin     Dementia POA. At present, the patient lacks capacity for medical decision-making--see note on discharge planning below. Her daughter, Dejah Jackson, is medical Power of . Fall: Patient had a laceration on both elbows     Overweight POA Body mass index is 28.51 kg/(m^2).      DVT prophylaxis with lovenox    Past Medical History:   Diagnosis Date    Acquired hypothyroidism 10/30/2017    Arthritis      Back pain 2/16/2011    Cancer ([facility] [address] 1993     colon    Chronic pain       in her back    Dementia 2011     Mild; Neuropsych testing with Dr. 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    S/P cardiac pacemaker procedure 8/24/2011    SSS (sick sinus syndrome) ([facility] [address] 8/20/2011    Syncope      Syncope and collapse 8/19/2011    Urinary frequency 2/16/2011               Past Surgical History:   Procedure Laterality Date    HX GI   1993     colon resection    HX HEENT         tonsillectomy in 1950    HX PACEMAKER        NC ENTEROSCOPY > 2ND PRTN ILEUM CONTROL BLEEDING   6/23/2011                     Social History   Substance Use Topics  Smoking status: Never Smoker    Smokeless tobacco: Never Used    Alcohol use No         Family History   Problem Relation Age of Onset    Diabetes Mother      Thyroid Disease Mother      Heart Disease Mother      Hypertension Father      Heart Disease Father      Diabetes Sister      Diabetes Brother                 Allergies   Allergen Reactions    Amoxicillin Unknown (comments)    Aricept [Donepezil] Other (comments)       Bad dreams.  Augmentin [Amoxicillin-Pot Clavulanate] Unknown (comments)    Pcn [Penicillins] Rash    Zithromax [Azithromycin] Unknown (comments)                 Prior to Admission medications    Medication Sig Start Date End Date Taking? Authorizing Provider   SYNTHROID 75 mcg tablet TAKE 1 TABLET BY MOUTH ON  MONDAY THROUGH SATURDAY AND 1/2 TAB ON SUNDAY AT [facility] MEDICALLY NECESSARY 5/19/18     Roxanna Rey MD   gabapentin (NEURONTIN) 300 mg capsule Take 2 caps in the morning and 1 cap at night 3/5/18     Historical Provider   escitalopram oxalate (LEXAPRO) 10 mg tablet TAKE 1 TABLET BY MOUTH  DAILY 4/23/18     Tiffanie Persaud MD   traMADol Gillian Tito) 50 mg tablet Take 1 Tab by mouth every six (6) hours as needed for Pain. Max Daily Amount: 200 mg. Indications: Pain 3/24/18     Cheryle Mckay DO   alendronate (FOSAMAX) 70 mg tablet TAKE 1 TABLET BY MOUTH  EVERY SUNDAY 1/11/18     Tiffanie Persaud MD   atorvastatin (LIPITOR) 10 mg tablet Take 1 tablet by mouth  daily 7/10/17     Tiffanie Persaud MD   co-enzyme Q-10 (CO Q-10) 100 mg capsule Take 200 mg by mouth daily. Historical Provider   Glucosamine &Chondroit-MV-Min3 900-464-96-0.5 mg tab Take 2 Tabs by mouth daily. Historical Provider   calcium-cholecalciferol, D3, (CALTRATE 600+D) tablet Take 2 Tabs by mouth daily. Historical Provider   vit B Cmplx 3-FA-Vit C-Biotin (NEPHRO BANDAR RX) 1- mg-mg-mcg tablet Take 1 Tab by mouth daily.        Historical Provider   OMEGA-3 FATTY ACIDS/FISH OIL (OMEGA 3 FISH OIL PO) Take 1 Cap by mouth two (2) times a day. Historical Provider   POLYETHYLENE GLYCOL 3350 (MIRALAX PO) Take  by mouth. 1-2   tsp daily       Historical Provider   aspirin delayed-release 81 mg tablet Take 81 mg by mouth daily. Historical Provider      [facility]  aspirin chewable tablet 81 mg Given        81 mg, PO, DAILY    05/24 0838       atorvastatin (LIPITOR) tablet 10 mg Given       10 mg, PO, QHS    05/23 2150       calcitonin (salmon) (MIACALCIN) nasal 1 Spray Given       1 Spray, INTRANASAL, DAILY    05/24 0836       calcium-vitamin D (OS-KAUSHIK) 500 mg-200 unit tablet Given       1 Tab, PO, BID WITH MEALS    05/24 0838       enoxaparin (LOVENOX) injection 40 mg Given       40 mg, SC, DAILY    05/24 0838       escitalopram oxalate (LEXAPRO) tablet 10 mg Given       10 mg, PO, DAILY    05/24 0004       levothyroxine (SYNTHROID) tablet 75 mcg Given       75 mcg, PO, 6am    05/24 0629       lidocaine (LIDODERM) 5 % patch 1 Patch Apply Patch       1 Patch, TD, Q24H    05/24 0630       polyethylene glycol (MIRALAX) packet 17 g Given       17 g, PO, DAILY              REVIEW OF SYSTEMS patient complains of significant back pain and leg pain, dizziness, weakness, frequent falls    PRE-MORBID FUNCTION: Modified independent with a walker      CURRENT FUNCTION: Mod assist ADLs and mobility       GENERAL MULTI-SYSTEM EXAMINATION:   Blood pressure 184/84, pulse 75, temperature 97.7 °F (36.5 °C), resp.  rate 18, height 5' 5\" (1.651 m), weight 171 lb 4.8 oz (77.7 kg), SpO2 93 on room air  General: Elderly female in no significant distress pleasant and cooperative alert and oriented   HEENT: Benign  Lungs clear to auscultation  Cor: Regular rate and rhythm  Abdomen positive bowel sounds nontender nondistended:  Extremities left lower extremity tender lower leg laterally, low  throughout the lumbar spine and lower thoracic spine centrally  Neuro exam patient has generalized weakness at 4/5 strength in all extremities but nonfocal  Skin right elbow laceration  LABS AND DIAGNOSTICS   Results for Nancy Ibarra (MRN [id number]) as of 5/24/2018 13:36   Ref. Range 5/20/2018 04:48   WBC Latest Ref Range: 3.6 - 11.0 K/uL 6.5   NRBC Latest Ref Range: 0  WBC 0.0   RBC Latest Ref Range: 3.80 - 5.20 M/uL 4.18   HGB Latest Ref Range: 11.5 - 16.0 g/dL 13.1   HCT Latest Ref Range: 35.0 - 47.0 % 38.6   MCV Latest Ref Range: 80.0 - 99.0 FL 92.3   MCH Latest Ref Range: 26.0 - 34.0 PG 31.3   MCHC Latest Ref Range: 30.0 - 36.5 g/dL 33.9   RDW Latest Ref Range: 11.5 - 14.5 % 13.6   PLATELET Latest Ref Range: 150 - 400 K/uL 192   MPV Latest Ref Range: 8.9 - 12.9 FL 9.6   NEUTROPHILS Latest Ref Range: 32 - 75 % 72   LYMPHOCYTES Latest Ref Range: 12 - 49 % 16   MONOCYTES Latest Ref Range: 5 - 13 % 7   EOSINOPHILS Latest Ref Range: 0 - 7 % 4   BASOPHILS Latest Ref Range: 0 - 1 % 0   IMMATURE GRANULOCYTES Latest Ref Range: 0.0 - 0.5 % 0   DF Latest Units:   AUTOMATED   ABSOLUTE NRBC Latest Ref Range: 0.00 - 0.01 K/uL 0.00   ABS. NEUTROPHILS Latest Ref Range: 1.8 - 8.0 K/UL 4.7   ABS. IMM. GRANS. Latest Ref Range: 0.00 - 0.04 K/UL 0.0   ABS. LYMPHOCYTES Latest Ref Range: 0.8 - 3.5 K/UL 1.1   ABS. MONOCYTES Latest Ref Range: 0.0 - 1.0 K/UL 0.5   ABS. EOSINOPHILS Latest Ref Range: 0.0 - 0.4 K/UL 0.2   ABS.  BASOPHILS Latest Ref Range: 0.0 - 0.1 K/UL 0.0   Sodium Latest Ref Range: 136 - 145 mmol/L 136   Potassium Latest Ref Range: 3.5 - 5.1 mmol/L 3.9   Chloride Latest Ref Range: 97 - 108 mmol/L 102   CO2 Latest Ref Range: 21 - 32 mmol/L 28   Anion gap Latest Ref Range: 5 - 15 mmol/L 6   Glucose Latest Ref Range: 65 - 100 mg/dL 84   BUN Latest Ref Range: 6 - 20 MG/DL 16   Creatinine Latest Ref Range: 0.55 - 1.02 MG/DL 0.74   BUN/Creatinine ratio Latest Ref Range: 12 - 20   22 (H)   Calcium Latest Ref Range: 8.5 - 10.1 MG/DL 9.3   GFR est non-AA Latest Ref Range: >60 ml/min/1.73m2 >60 GFR est AA Latest Ref Range: >60 ml/min/1.73m2 >60   Bilirubin, total Latest Ref Range: 0.2 - 1.0 MG/DL 0.8   Protein, total Latest Ref Range: 6.4 - 8.2 g/dL 6.5   Albumin Latest Ref Range: 3.5 - 5.0 g/dL 3.0 (L)   Globulin Latest Ref Range: 2.0 - 4.0 g/dL 3.5   A-G Ratio Latest Ref Range: 1.1 - 2.2   0.9 (L)   ALT (SGPT) Latest Ref Range: 12 - 78 U/L 25   AST Latest Ref Range: 15 - 37 U/L 25   Alk. phosphatase Latest Ref Range: 45 - 117 U/L 71     ASSESSMENT AND PLAN: Impairment/Disability due to:    Multiple fractures including right fibula L1 fracture secondary to osteoporosis and fall  Associated Impairments: As above    Functional Deficits Summary: See Current Function above. CO-MORBID CONDITIONS/PLAN:      Co-morbid conditions which require 24-hour rehab nursing and ongoing physician oversight and management:  Syncope monitor blood pressure and orthostatics return to activity and exercise  Low back pain avoid narcotics consider Ultram use Lidoderm patches  Hypertension continue current medications monitor closely with return to activity and exercise  Hypothyroidism currently on no medications  Neuropathy  Obesity registered dietitian  GI pain bowel program  Hyperlipidemia Lipitor  Insomnia melatonin as needed  Urinary frequency rule out urinary tract infection  Pacemaker E stim contraindicated    POST-ADMISSION PHYSICIAN EVALUATION:  The following plan is developed in consideration of current medical and rehabilitation status and review of the preadmission assessment. Compared to the preadmission screening, the patient's current function and medical condition is the same  EXPECTED LEVEL OF IMPROVEMENT:  This patients rehabilitation potential is good to make improvements to overall functional goal of supervision to modified independent for ADLs, Mobility, and Cognition/Speech.     ESTIMATED LENGTH OF stay: 7-14 days  ANTICIPATED DISCHARGE destination: Discharge to home  PRECAUTIONS:   Pacemaker  POST DISCHARGE REHABILITATION treatments: Home health physical therapy occupational therapy transition to outpatient consider vestibular rehabilitation    POST DISCHARGE MEDICAL follow-up:   primary care, physical medicine rehabilitation  INDIVIDUALIZED REHABILITATION PLAN:   1. PT, 1 to 2 hours a day, 5 to 6 days a week for gait, strengthening, range of motion, balance and endurance. 2. OT, 1 to 2 hours a day, 5 to 6 days a week for basic ADLs, strengthening, endurance, coordination and adaptive equipment. 3. Nursing, 24-hour care by specialized nurse trained in rehabilitation for vital signs monitoring, medication administration and education, pain control, nutrition, bowel and bladder management   4. Case management, 1 hour a day, 3 days a week for discharge planning and team coordination. SUMMARY STATEMENT FOR MEDICAL NECESSITY:   This patient has complex nursing, medical, and rehabilitation needs that require inpatient interdisciplinary rehabilitation. This patient requires multiple therapy disciplines, at least one of which is PT or OT, using an interdisciplinary approach. Interdisciplinary Team meetings will be held at least once a week. The patient requires intensive therapies 3 hours per day, 5 days per week, or in special instances, at least 15 hours within 7 consecutive days. This patient can actively participate in, and can be reasonably expected to benefit significantly from, the intensive rehabilitation program.  The patient requires close medical supervision by a rehabilitation physician. Signature:_________________________________      Arnoldo Shrestha.  Josefa Childress M.D. 5/23/2018 6:30 AM

## 2018-05-29 NOTE — PROGRESS NOTES
Reviewed medications with Maday Hendrix RN/Nurse Coordinator today. He verified meds and noted a couple of additions to pain medications and changes. He states the synthroid is currently being given as 75mcg 1 tab daily rather than 75 mcg 1 tab daily M-Saturday and 1/2 tab on Sunday as noted from original d/c instructions from hospital to rehab. He will make note to MD for clarification on this. He also states that the ultram is currently being given on a schedule as 50 mg TID and also added roxicodone 5mg q3h prn. He states they are giving calcium but they are giving Os-Rafael 500mg BID and they are giving the Miralax 17 gm daily (dosages are in packets rather than by tsp). No current facility-administered medications for this visit. No current outpatient prescriptions on file.      Facility-Administered Medications Ordered in Other Visits   Medication Dose Route Frequency    traMADol (ULTRAM) tablet 50 mg  50 mg Oral TID    melatonin tablet 3 mg  3 mg Oral QHS    aspirin chewable tablet 81 mg  81 mg Oral DAILY    atorvastatin (LIPITOR) tablet 10 mg  10 mg Oral QHS    calcitonin (salmon) (MIACALCIN) nasal 1 Spray  1 Spray IntraNASal DAILY    calcium-vitamin D (OS-RAFAEL) 500 mg-200 unit tablet  1 Tab Oral BID WITH MEALS    escitalopram oxalate (LEXAPRO) tablet 10 mg  10 mg Oral DAILY    levothyroxine (SYNTHROID) tablet 75 mcg  75 mcg Oral 6am    lidocaine (LIDODERM) 5 % patch 1 Patch  1 Patch TransDERmal Q24H    acetaminophen (TYLENOL) tablet 650 mg  650 mg Oral Q3H PRN    oxyCODONE IR (ROXICODONE) tablet 5 mg  5 mg Oral Q3H PRN    nitroglycerin (NITROSTAT) tablet 0.4 mg  0.4 mg SubLINGual Q5MIN PRN    aluminum-magnesium hydroxide (MAALOX) oral suspension 30 mL  30 mL Oral Q4H PRN    ondansetron (ZOFRAN ODT) tablet 4 mg  4 mg Oral Q6H PRN    enoxaparin (LOVENOX) injection 40 mg  40 mg SubCUTAneous DAILY    polyethylene glycol (MIRALAX) packet 17 g  17 g Oral DAILY    magnesium hydroxide (MILK OF MAGNESIA) 400 mg/5 mL oral suspension 30 mL  30 mL Oral DAILY PRN    bisacodyl (DULCOLAX) suppository 10 mg  10 mg Rectal DAILY PRN

## 2018-05-30 NOTE — PROGRESS NOTES
Spoke with Dagmar Patricia at [facility]. Patient is planned for discharge home on Monday 6-4-18 with home health services. Her daughter will decide what [facility] agency she wishes to use later. Driss Lucas will plan to fax me the discharge instructions, medications to my fax number ([phone]) when ready for discharge and will keep me posted of any changes in plan. I have contacted Elio Whitten at [facility]/Dr. Estefania Arciniega office and they have set up the [email or web address] f/u appt for Friday 6-8-18 at 10:45 am and Driss Lucas is aware of this and will place this on d/c instructions and communicate with pt/family.

## 2018-06-04 NOTE — TELEPHONE ENCOUNTER
[facility] states she needs a call back in reference to seeing if Dr. Marcio Castrejon will follow patient for Home Health. Please call.  Thank you

## 2018-06-05 NOTE — Clinical Note
Ms. Sepulveda Coming in for [facility] 6-6-18, just left [facility], daughter indicated she was discharged with no script for lidoderm patch, and hasn't had any therapy since leaving, home care not starting until 6-6.   Thanks, [email or web address]

## 2018-06-05 NOTE — PROGRESS NOTES
Hospital Discharge Follow-Up      Date/Time:  2018 3:36 PM    Patient was admitted to [facility] on 18 and discharged on 18 then transferred to inpatient rehab on 18 to [facility] and then discharged home 18 for syncope, collapse, acute non displaced fx superior endplate L1, chronic mildly displaced fx proximal 3rd of Right fibula. The physician discharge summary was available at the time of outreach. Patient was contacted within 1 business days of discharge. Top Challenges reviewed with the provider   Patient has issues with back/leg pain, does not have RX for lidoderm patch/daughter with concerns. Home care/therapy services have not started since discharge from rehab (discussed with daughter today). Patient coming for [facility] 18         Method of communication with provider :chart routing    Inpatient RRAT score: 15  Was this a readmission? no   Patient stated reason for the readmission: n/a    Nurse Navigator (NN) contacted the family by telephone to perform post hospital discharge assessment. Verified name and  with family as identifiers. Provided introduction to self, and explanation of the Nurse Navigator role. Reviewed discharge instructions and red flags with family who verbalized understanding. Family given an opportunity to ask questions and does not have any further questions or concerns at this time. The family agrees to contact the PCP office for questions related to their healthcare. NN provided contact information for future reference. Summary of patient's top problems:  1. High risk of falls/safety/mobility issues  2.  Pain management    Home Health orders at discharge: Skilled Nursing, Physical Therapy and [address] [place] [address]: At [facility]  Date of initial visit: 18    Barriers to care? lack of knowledge about disease    Advance Care Planning:   Does patient have an Advance Directive:  reviewed and current Medication(s):     New Medications at Discharge: none  Changed Medications at Discharge: none  Discontinued Medications at Discharge: none    Medication reconciliation was performed with Daughter, who verbalizes understanding of administration of home medications. There were no barriers to obtaining medications identified at this time. Referral to Pharm D needed: no     Current Outpatient Prescriptions   Medication Sig    calcitonin, salmon, (MIACALCIN) nasal 1 [place] by IntraNASal route daily.  SYNTHROID 75 mcg tablet TAKE 1 TABLET BY MOUTH ON  MONDAY THROUGH SATURDAY AND 1/2 TAB ON SUNDAY AT BEDTIME--BRAND MEDICALLY NECESSARY    escitalopram oxalate (LEXAPRO) 10 mg tablet TAKE 1 TABLET BY MOUTH  DAILY    traMADol (ULTRAM) 50 mg tablet Take 1 Tab by mouth every six (6) hours as needed for Pain. Max Daily Amount: 200 mg. Indications: Pain    atorvastatin (LIPITOR) 10 mg tablet Take 1 tablet by mouth  daily    calcium-cholecalciferol, D3, (CALTRATE 600+D) tablet Take 2 Tabs by mouth daily.  POLYETHYLENE GLYCOL 3350 (MIRALAX PO) Take  by mouth. 1-2   tsp daily    aspirin delayed-release 81 mg tablet Take 81 mg by mouth daily.  lidocaine (LIDODERM) 5 % by TransDERmal route every twenty-four (24) hours. Apply patch to the affected area for 12 hours a day and remove for 12 hours a day. No current facility-administered medications for this visit. Of note:  When reviewing medications, daughter indicated that patient does have pill box for medications. She actually takes lipitor and lexapro at bedtime. She does have some additional vitamins but daughter did not have those dosages/list in front of her as she was at work but indicated it was: vit b complex daily, krill oil daily, vit d daily at lunchtime. She also notes that she is on alendronate once weekly on Sundays. There are no discontinued medications.     PCP/Specialist follow up: Future Appointments  Date Time Provider Department Center   6/6/2018 10:45 AM Suresh Lewis MD [address]   8/8/2018 1:00 PM Shashank Dillard  [facility]   11/8/2018 10:30 AM Suresh Lewis MD [facility] SCHED   11/26/2018 10:30 AM Adrianna Christina MD [facility] [address] mobility and safety            6-5-18:  Discussed with Ms. Sepulveda about safety and mobility. Patient with hx of syncope, falls. Currently she denies any sob, chest pain or dizziness. She is s/p acute non displaced fx superior endplate L1 and proximal fibular fx with pain issues. She is using a RW for mobility. At Home care has been arranged to follow for SN/PT/OT services for f/u post inpatient rehab stay. Spoke with daughter today Yris Pate) and she expresses concern that Ms. Sepulveda has not started therapy as of yet since no therapy since Sunday and knows that her Mom will not move as much as she should due to pain issues without therapy working with her. Noted home care plans to start tomorrow. Patient also with ITZEL appt with PCP in am.  Will continue to follow up with call in approx 1 week. CKL        Pain management            6-5-18:  Spoke with Ms. Sepulveda today asking about her pain. She states her pain is about the same as it always is. She indicates she does have pain in her back and rt leg. She indicates she does take tramadol for pain but denies having lidoderm patches right now. I spoke with her daughter Yris Pate Jared/Permission to Disclose/all/4-28-17, verified with 2 identifiers) and she indicates that her Mom does have pain issues and that she had them while at [facility] for rehab and that the tramadol wasn't enough control and they had to add additional medication. However, when she was discharged yesterday, they did not give them a prescription for the lidoderm patches. Ms. Sepulveda will be seeing Dr. Le Kunz tomorrow so she plans to discuss her pain management tomorrow and need for RX.   Will continue to follow up with call in approx 1 week.   CKL

## 2018-06-06 NOTE — PROGRESS NOTES
1. Have you been to the ER, urgent care clinic since your last visit? Hospitalized since your last visit?no    2. Have you seen or consulted any other health care providers outside of the [facility] since your last visit? Include any pap smears or colon screening.  no

## 2018-06-06 NOTE — PROGRESS NOTES
SUBJECTIVE  Ms. Daphne Mckay presents today acutely for [facility] management. Her daughter is with her. Chief Complaint   Patient presents with   [facility] Follow Up     pt here today for hospital [facility] because she fainted and broke R lower leg and spine; pt was seen at rehab for the past 2 weeks; the tramadol on hand does not help       She was in hospital 5/19-5/24:     History:     80 Y. O. WF  Known recurrent syncope over past year  Seen By Dr Esme Rust and then Dr Pavel Oconnor at [address] [place] [address], daughter unaware of etiology, ? Vasovagal  Eventually had pacemaker placed  Recurrent syncope end of April 2018, Pacemaker rate increased from 65 to 75  Fractured her right fibula, mild pain at area, able to walk     Today was on patio, walking, suddenly passed out and woke up on floor  Woke up quickly, a bit groggy initially, then back to baseline  Pain in low back and increased pain in right leg  Got up and took some tylenol, but pain in lumbar spine and right calve increased to severe  Not able to get up and walk, came to ED  No HA or head trauma or SOB or CP or N/V or diarrhea     ED HD stable  Acute L1 compression fracture, ? Increased fracture at right fibula  Not able to ambulate  We were called to admit the patient    Hospital course:   Ms. Daphne Mckay is a patient of mine who presented with the problems above. See the initial H and P for full details. By problem. ..     Syncope and collapse (5/19/2018): Recurrent; has been following with cardiology, and prior work up with Dr. Esme Rust [facility]) and Dr. Pavel Oconnor at [address] [place] [address] has been unrevealing. Dell Woodward has a pacemaker, with rate readjusted after syncopal spell in April.  Cardiology consulted--She was seen by Dr. Idris Lomeli. Echo as above.   Pacemaker interrogated and okay.      Low back pain, with acute non displaced fracture superior endplate L1 POA: Lidoderm patch, prn percocet.  Orthopedics consulted; from their note:      -Nondisplaced acute fracture superior endplate L1.  -Chronic mildly displaced fracture of the proximal third of the fibula with callus formation may have an underlying acute component.      WBAT with walker  Nasal calcitonin   Office F/U with Radha Jane      Right fibula fracture POA: Initial fracture after last syncope in April 2018/ Pain increased after fall. X-ray as above.  Orthopedics to see.       HTN: Currently off meds due to syncope.  BP increased at admit, may need to resume if persistent after pain control     Hypothyroidism POA continue current replacement     Neuropathy POA continue neurontin     Dementia POA. At present, the patient lacks capacity for medical decision-making--see note on discharge planning below. Her daughter, Hayden Hewitt, is medical Power of .       Overweight POA Body mass index is 28.51 kg/(m^2).     DVT prophylaxis with lovenox     Code status: full code     Discharge planning: PT and OT have recommended SNF or inpatient rehab; however, the patient insisted on going home. Her daughter, Hayden Hewitt, spoke with me by phone and was adamant that \"she can't come home, and she doesn't know what she's saying. \"  She felt that the patient is incapable of decision making and that as MPOA she should be able to override her mother's wish, and get her into rehab.  We sought an opinion from Psychiatry on the issue of capacity and she was seen by Dr. Alicia Beach:      the patient does not understand the risk and benefit if accepting or refusing rehab therapy and does not sound able to take the right medical decision       We proceeded with placement short term for inpatient PT and OT, and she has been accepted at [facility]. She was discharged from [facility] day before yesterday. No pain meds. No lidocaine patch, and no oxycodone. \"She's trying tylenol but that's not cutting it. \"    No falls / syncope since coming home. Home health comes this afternoon.      Past Medical History:   Diagnosis Date    Acquired hypothyroidism 10/30/2017    Arthritis     Back pain 2/16/2011    Cancer ([facility] 1993    colon    Chronic pain     in her back    Dementia 2011    Mild; Neuropsych testing with Dr. 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  SYNTHROID 75 mcg tablet TAKE 1 TABLET BY MOUTH ON  MONDAY THROUGH SATURDAY AND 1/2 TAB ON SUNDAY AT BEDTIME--BRAND MEDICALLY NECESSARY 85 Tab 3    escitalopram oxalate (LEXAPRO) 10 mg tablet TAKE 1 TABLET BY MOUTH  DAILY 90 Tab 3    atorvastatin (LIPITOR) 10 mg tablet Take 1 tablet by mouth  daily 90 Tab 3    calcium-cholecalciferol, D3, (CALTRATE 600+D) tablet Take 2 Tabs by mouth daily.  POLYETHYLENE GLYCOL 3350 (MIRALAX PO) Take  by mouth. 1-2   tsp daily      aspirin delayed-release 81 mg tablet Take 81 mg by mouth daily.  lidocaine (LIDODERM) 5 % by TransDERmal route every twenty-four (24) hours. Apply patch to the affected area for 12 hours a day and remove for 12 hours a day.  traMADol (ULTRAM) 50 mg tablet Take 1 Tab by mouth every six (6) hours as needed for Pain. Max Daily Amount: 200 mg. Indications: Pain 10 Tab 0     No current facility-administered medications on file prior to visit.         SH: She reports that she has never smoked. She has never used smokeless tobacco. She reports that she does not drink alcohol or use illicit drugs. ROS: Complete review of systems was performed and is otherwise unremarkable except as noted elsewhere. OBJECTIVE  Visit Vitals    /71 (BP 1 Location: Left arm, BP Patient Position: Sitting)    Pulse 75    Temp 97.7 °F (36.5 °C) (Oral)    Resp 18    Ht 5' 5\" (1.651 m)    Wt 163 lb 6.4 oz (74.1 kg)    SpO2 99%    BMI 27.19 kg/m2     Gen: Pleasant 80 y.o.  female in NAD.   HEENT: PERRLA. EOMI. OP moist and pink.  Neck: Supple.  No LAD.  HEART: RRR, No M/G/R.   LUNGS: CTAB No W/R.   ABDOMEN: S, NT, ND, BS+.   EXTREMITIES: Warm. No C/C/E. MUSCULOSKELETAL: Normal ROM, muscle strength 5/5 all groups. NEURO: Alert and oriented x 3.  Cranial nerves grossly intact.  No focal sensory or motor deficits noted. SKIN: Warm. Dry. No rashes or other lesions noted. ASSESSMENT / PLAN  1. Syncope and collapse (5/19/2018): Recurrent; has been following with cardiology, and prior work up with Dr. Hanna Quach [facility]) and Dr. Terra Diaz at [facility] has been unrevealing. Addi Smoker has a pacemaker, with rate readjusted after syncopal spell in April.  Cardiology consulted--She was seen by Dr. Donato Resendez as above.  Pacemaker interrogated and okay. 2. Low back pain, with acute non displaced fracture superior endplate L1 POA: Lidoderm patch, prn oxycodone ordered. 3. Right leg pain due to fibula fracture in April 2018.    4. HTN: Fine today. Currently off meds due to syncope.  BP increased at admit, may need to resume if persistent after pain control  5. Hypothyroidism: continue current replacement  6. Neuropathy: continue neurontin  7.  Dementia POA.  At present, the patient lacks capacity for medical decision-making--see note on discharge planning below.  Her daughter, Ann Mann, is medical Power of .      I have reviewed with the patient details of the assessment and plan and all questions were answered. Relevant patient education was performed. Follow-up Disposition: As scheduled.

## 2018-06-06 NOTE — MR AVS SNAPSHOT
[address] [facility] 
[phone] Patient: Jadon Gee MRN: [id number] :3/6/1932 Visit Information Date & Time Provider Department Dept. Phone Encounter #  
 2018 10:45 AM Asha Contreras,  [facility] [phone] [id number] Your Appointments 2018  1:00 PM  
ESTABLISHED PATIENT with MD [facility] [place]) Appt Note: 6 MONTH FOLLOW UP PER DR. HOU 18 [place]  
 [address] [place] [address] [address] [place] [address]  
[phone]  
  
   
 [address] [place] [address] [address] [place] [address]  
  
    
 2018 10:30 AM  
ROUTINE CARE with Asha Contreras MD  
[facility]) Appt Note: 6 mon f/u  
 [address] [facility] [address] [address]  
[address] [facility]  
  
    
 2018 10:30 AM  
Follow Up with Claudio Weaver MD  
[facility]) Appt Note: 6 month f/u Thyroid [facility] [address] [id number] [address] [place] [address] Upcoming Health Maintenance Date Due  
 GLAUCOMA SCREENING Q2Y 2016 Influenza Age 5 to Adult 2018 MEDICARE YEARLY EXAM 2019 DTaP/Tdap/Td series (2 - Td) 10/22/2025 Allergies as of 2018  Review Complete On: 2018 By: Caroll Landau Severity Noted Reaction Type Reactions Amoxicillin  2011    Unknown (comments) Aricept [Donepezil]  2012    Other (comments) Bad dreams. Augmentin [Amoxicillin-pot Clavulanate]  2011    Unknown (comments) Pcn [Penicillins]  2016    Rash Zithromax [Azithromycin]  2011    Unknown (comments) Current Immunizations  Reviewed on 10/22/2015 Name Date Influenza High Dose Vaccine PF 10/30/2017 11:45 AM  
 Influenza Vaccine 10/22/2014 10:31 AM, 10/21/2013 Influenza Vaccine Haylie Hane) 10/22/2015 Influenza Vaccine (Quad) PF 10/27/2016 Influenza Vaccine Split 10/9/2012, 10/3/2011 Influenza Vaccine Whole 10/1/2010 Pneumococcal Conjugate (PCV-13) 11/17/2015 Tdap 10/22/2015 ZZZ-RETIRED (DO NOT USE) Pneumococcal Vaccine (Unspecified Type) 10/1/2010 Not reviewed this visit You Were Diagnosed With   
  
 Codes Comments Closed fracture of distal end of right tibia with routine healing, unspecified fracture morphology, subsequent encounter    -  Primary ICD-10-CM: S82.301D ICD-9-CM: V54.19 Midline low back pain, unspecified chronicity, with sciatica presence unspecified     ICD-10-CM: M54.5 ICD-9-CM: 724.2 Syncope, unspecified syncope type     ICD-10-CM: R55 
ICD-9-CM: 780. 2 Vitals BP Pulse Temp Resp Height(growth percentile) Weight(growth percentile) 115/71 (BP 1 Location: Left arm, BP Patient Position: Sitting) 75 97.7 °F (36.5 °C) (Oral) 18 5' 5\" (1.651 m) 163 lb 6.4 oz (74.1 kg) SpO2 BMI OB Status Smoking Status 99% 27.19 kg/m2 Postmenopausal Never Smoker Vitals History BMI and BSA Data Body Mass Index Body Surface Area  
 27.19 kg/m 2 1.84 m 2 Preferred Pharmacy Pharmacy Name Phone [facility] [address] [phone] Your Updated Medication List  
  
   
This list is accurate as of 6/6/18 11:23 AM.  Always use your most recent med list.  
  
  
  
  
 aspirin delayed-release 81 mg tablet Take 81 mg by mouth daily. atorvastatin 10 mg tablet Commonly known as:  LIPITOR Take 1 tablet by mouth  daily  
  
 calcitonin (salmon) nasal  
Commonly known as:  MIACALCIN  
1 [place] by IntraNASal route daily. calcium-cholecalciferol (D3) tablet Commonly known as:  CALTRATE 600+D Take 2 Tabs by mouth daily. escitalopram oxalate 10 mg tablet Commonly known as:  Maribeth Angst TAKE 1 TABLET BY MOUTH  DAILY  
  
 lidocaine 5 % Commonly known as:  LIDODERM  
1 Patch by TransDERmal route every twenty-four (24) hours. Apply patch to the affected area for 12 hours a day and remove for 12 hours a day. MIRALAX PO Take  by mouth. 1-2  tsp daily  
  
 oxyCODONE IR 5 mg immediate release tablet Commonly known as:  Valene Carrero Take 1 Tab by mouth every six (6) hours as needed. Max Daily Amount: 20 mg. SYNTHROID 75 mcg tablet Generic drug:  levothyroxine TAKE 1 TABLET BY MOUTH ON  MONDAY THROUGH SATURDAY AND 1/2 TAB ON  AT [facility] MEDICALLY NECESSARY Prescriptions Printed Refills  
 lidocaine (LIDODERM) 5 % 5 Si Patch by TransDERmal route every twenty-four (24) hours. Apply patch to the affected area for 12 hours a day and remove for 12 hours a day. Class: Print Route: TransDERmal  
 oxyCODONE IR (ROXICODONE) 5 mg immediate release tablet 0 Sig: Take 1 Tab by mouth every six (6) hours as needed. Max Daily Amount: 20 mg.  
 Class: Print Route: Oral  
  
Introducing [facility]! Dear Shalonda Moffett: 
Thank you for requesting a [employer] account. Our records indicate that you already have an active [employer] account. You can access your account anytime at [email or web address] Did you know that you can access your hospital and ER discharge instructions at any time in [employer]? You can also review all of your test results from your hospital stay or ER visit. Additional Information If you have questions, please visit the Frequently Asked Questions section of the [employer] website at [email or web address] Remember, [employer] is NOT to be used for urgent needs. For medical emergencies, dial 911. Now available from your iPhone and Android! Please provide this summary of care documentation to your next provider. Your primary care clinician is listed as [place]. If you have any questions after today's visit, please call [phone].

## 2018-06-12 NOTE — PROGRESS NOTES
Goals      mobility and safety            6-5-18:  Discussed with Ms. Sepulveda about safety and mobility. Patient with hx of syncope, falls. Currently she denies any sob, chest pain or dizziness. She is s/p acute non displaced fx superior endplate L1 and proximal fibular fx with pain issues. She is using a RW for mobility. [facility] has been arranged to follow for SN/PT/OT services for f/u post inpatient rehab stay. Spoke with daughter today Afsaneh Lowe) and she expresses concern that Ms. Sepulveda has not started therapy as of yet since no therapy since Sunday and knows that her Mom will not move as much as she should due to pain issues without therapy working with her. Noted home care plans to start tomorrow. Patient also with ITZEL appt with PCP in am.  Will continue to follow up with call in approx 1 week. CKL     6-12-18:  Per Ms. Coleman, Ms. Sepulveda is mobilizing well with the walker and getting around the home. She has had no further falls at this time. She states her falls are not due to tripping over things but due to the sudden onset of passing out. These episodes just infrequently occur and she has had significant cardiac w/u and they have not find the reason behind this. She continues to be followed by [facility] for services for nursing/therapy. Will f/u in approx 1 week. CKL        Pain management            6-5-18:  Spoke with Ms. Sepulveda today asking about her pain. She states her pain is about the same as it always is. She indicates she does have pain in her back and rt leg. She indicates she does take tramadol for pain but denies having lidoderm patches right now. I spoke with her daughter Afsaneh Coleman/Permission to Disclose/all/4-28-17, verified with 2 identifiers) and she indicates that her Mom does have pain issues and that she had them while at [facility] for rehab and that the tramadol wasn't enough control and they had to add additional medication.   However, when she was discharged yesterday, they did not give them a prescription for the lidoderm patches. Ms. Sepulveda will be seeing Dr. Susi Lara tomorrow so she plans to discuss her pain management tomorrow and need for RX. Will continue to follow up with call in approx 1 week. BIANKA     6-12-18:  Spoke with Elvia Coleman/shahid (Permission to Disclose/4-28-17/verified with 2 identifiers). She states her Mom's phone is out of service and has been since Sunday. She states she is doing well. She was able to reach the caregiver earlier this morning. Her pain levels are about 5/6 if she just takes tylenol and 3/4 if she is using the oxycodone. If she doesn't take any pain meds, it runs about 8/9 but the pain does not stop her from mobilizing as she is doing very well with that and using her walker to ambulate and has had not further falls. She indicates that they have not filled the lidoderm patches as there is an insurance issue currently as the pharmacy indicates there insurance will not fill without a Prior Auth from the MD so the pharmacy is working on that but per Ms. Coleman, she isn't concerned with the lidoderm patch either way as she feels her Mom is doing well between the tylenol and the oxycodone. Will f/u in approx 1 week.   BIANKA

## 2018-06-26 NOTE — PROGRESS NOTES
Goals      mobility and safety            6-5-18:  Discussed with Ms. Sav Spivey about safety and mobility. Patient with hx of syncope, falls. Currently she denies any sob, chest pain or dizziness. She is s/p acute non displaced fx superior endplate L1 and proximal fibular fx with pain issues. She is using a RW for mobility. [facility] been [facility] to follow for SN/PT/OT services for f/u post inpatient rehab stay. Spoke with daughter today Nereyda Goldstein) and she expresses concern that Ms. Sav Spivey has not started therapy as of yet since no therapy since Sunday and knows that her Mom will not move as much as she should due to pain issues without therapy working with her. Noted home care plans to start tomorrow. Patient also with ITZEL appt with PCP in am.  Will continue to follow up with call in approx 1 week. [facility]     6-12-18:  Per Ms. Drapergs, Ms. Sav Spivey is mobilizing well with the walker and getting around the home. She has had no further falls at this time. She states her falls are not due to tripping over things but due to the sudden onset of passing out. These episodes just infrequently occur and she has had significant cardiac w/u and they have not find the reason behind this. [facility] be [facility] for [facility] for nursing/therapy. Will f/u in approx 1 week. [facility]     6-21-18:  Per Ms. Sav Spivey, she is mobilizing well with her walker around her home. She has had no falls since discharge from the hospital and has had no syncope events. She continues to have [facility] services and indicates they come 2x per week. Will f/u with call in approx 1 week. [facility]        Pain management            6-5-18:  Spoke with Ms. aSv Spivey today asking about her pain. She states her pain is about the same as it always is. She indicates she does have pain in her back and rt leg. She indicates she does take tramadol for pain but denies having lidoderm patches right now.   I spoke with her daughter Yris Pate Jared/Permission to Disclose/all/4-28-17, verified with 2 identifiers) and she indicates that her Mom does have pain issues and that she had them while at [facility] for rehab and that the tramadol wasn't enough control and they had to add additional medication. However, when she was discharged yesterday, they did not give them a prescription for the lidoderm patches. Ms. Sepulveda will be seeing Dr. Le Kunz tomorrow so she plans to discuss her pain management tomorrow and need for RX. Will continue to follow up with call in approx 1 week. [facility]     6-12-18:  Spoke with Steven Yrn Coleman/dtr (Permission to Disclose/4-28-17/verified with 2 identifiers). She states her Mom's phone is out of service and has been since Sunday. She states she is doing well. She was able to reach the caregiver earlier this morning. Her pain levels are about 5/6 if she just takes tylenol and 3/4 if she is using the oxycodone. If she doesn't take any pain meds, it runs about 8/9 but the pain does not stop her from mobilizing as she is doing very well with that and using her walker to ambulate and has had not further falls. She indicates that they have not filled the lidoderm patches as there is an insurance issue currently as the pharmacy indicates there insurance will not fill without a Prior Auth from the MD so the pharmacy is working on that but per MsDez Jared, she isn't concerned with the lidoderm patch either way as she feels her Mom is doing well between the tylenol and the oxycodone. Will f/u in approx 1 week. [facility]     6-21-18:  Spoke with Ms. Sepulveda today (verified with 2 identifiers). She states she is taking her pain medication and her pain levels usually are at about 7/8. She states she uses both tylenol and oxycodone but tries not to use the oxycodone that much.   She indicates she does not use \"pain patches\" (as it was noted from my previous conversation with daughter that lidoderm patches required Prior authorization for approval). Spoke with PCP staff today and they are already working on Prior auth at this time. If Prior auth for lidoderm is denied, NN mentioned to PCP staff if MD would consider another alternative/comparible medication. Will continue to follow up with call in approx 1 week.   BIANKA

## 2018-06-26 NOTE — PROGRESS NOTES
Goals      mobility and safety            6-5-18:  Discussed with Ms. Jessica Smith about safety and mobility. Patient with hx of syncope, falls. Currently she denies any sob, chest pain or dizziness. She is s/p acute non displaced fx superior endplate L1 and proximal fibular fx with pain issues. She is using a RW for mobility. [facility] has been arranged to follow for SN/PT/OT services for f/u post inpatient rehab stay. Spoke with daughter today Afsaneh Lowe) and she expresses concern that Ms. Jessica Smith has not started therapy as of yet since no therapy since Sunday and knows that her Mom will not move as much as she should due to pain issues without therapy working with her. Noted home care plans to start tomorrow. Patient also with ITZEL appt with PCP in am.  Will continue to follow up with call in approx 1 week. [facility]     6-12-18:  Per Ms. Coleman, Ms. Jessica Smith is mobilizing well with the walker and getting around the home. She has had no further falls at this time. She states her falls are not due to tripping over things but due to the sudden onset of passing out. These episodes just infrequently occur and she has had significant cardiac w/u and they have not find the reason behind this. She [facility] to be [facility] for [facility] for nursing/therapy. Will f/u in approx 1 week. [facility]     6-21-18:  Per Ms. Jessica Smith, she is mobilizing well with her walker around her home. She has had no falls since discharge from the hospital and has had no syncope events. She continues to have At Home care services and indicates they come 2x per week. Will f/u with call in approx 1 week. [facility]     6-26-18:  Spoke with Joan/Xiomy Coleman today (Permission to Disclose/4-28-17/verified with 2 identifiers). She states her Mother is currently out with caregiver at her ENT appt. She is being seen today for issues related to her Left eardrum.   She mobilizes well so long as she uses her walker but she is often resistant to using the walker. She continues to have home care following her but per daughter, this should be their last week following her. She has had no further falls or syncope events. Will f/u in approx 1 week. [facility]        Pain management            6-5-18:  Spoke with Ms. Sepulveda today asking about her pain. She states her pain is about the same as it always is. She indicates she does have pain in her back and rt leg. She indicates she does take tramadol for pain but denies having lidoderm patches right now. I spoke with her daughter Rosa Coleman/Permission to Disclose/all/4-28-17, verified with 2 identifiers) and she indicates that her Mom does have pain issues and that she had them while at [facility] for rehab and that the tramadol wasn't enough control and they had to add additional medication. However, when she was discharged yesterday, they did not give them a prescription for the lidoderm patches. Ms. Sepulveda will be seeing Dr. Ady Sullivan tomorrow so she plans to discuss her pain management tomorrow and need for RX. Will continue to follow up with call in approx 1 week. [facility]     6-12-18:  Spoke with Ann Coleman/shahid (Permission to Disclose/4-28-17/verified with 2 identifiers). She states her Mom's phone is out of service and has been since Sunday. She states she is doing well. She was able to reach the caregiver earlier this morning. Her pain levels are about 5/6 if she just takes tylenol and 3/4 if she is using the oxycodone. If she doesn't take any pain meds, it runs about 8/9 but the pain does not stop her from mobilizing as she is doing very well with that and using her walker to ambulate and has had not further falls. She indicates that they have not filled the lidoderm patches as there is an insurance issue currently as the pharmacy indicates there insurance will not fill without a Prior Auth from the MD so the pharmacy is working on that but per Ms. Coleman, she isn't concerned with the lidoderm patch either way as she feels her Mom is doing well between the tylenol and the oxycodone. Will f/u in approx 1 week. [facility]     6-21-18:  Spoke with Ms. Sepulveda today (verified with 2 identifiers). She states she is taking her pain medication and her pain levels usually are at about 7/8. She states she uses both tylenol and oxycodone but tries not to use the oxycodone that much. She indicates she does not use \"pain patches\" (as it was noted from my previous conversation with daughter that lidoderm patches required Prior authorization for approval). Spoke with PCP staff today and they are already working on Prior auth at this time. If Prior auth for lidoderm is denied, NN mentioned to PCP staff if MD would consider another alternative/comparible medication. Will continue to follow up with call in approx 1 week. [facility]     6-26-18:  Spoke with shahid/Xiomy Coleman re: pain management. I discussed Ms. Sepulveda comments about her pain from last week. Ms. Ernie Rivers does not believe that Ms. Sepulveda really runs generally at a 7/8 pain scale. She notes that a lot of her views on pain relate to how her Dad (Mr. Sepulveda) is doing at the time. If he is not well, which he is having issues himself right now with kidney stones, and requiring a lot of care and time with Ms. Sepulveda, it affects her such as not getting a lot of sleep etc which affects her view on pain etc.  She was over checking on her on Sat/Sunday and she was doing well and only taking ES Tylenol not the oxycodone. She indicates the pain is probably more in the level of 3-4 for her. Ms. Ernie Rivers also has been talking with caregiver on regular basis as well as the caregiver monitors her closely for pain too. Ms. Ernie Rivers just found out about PCP recommendation to take her to [facility] for f/u and seeing about obtaining the lidoderm patches. Ms. Ernie Rivers feels the patches may not be that beneficial to her now but wants to talk with Ms. Sepulveda about going to [facility]. She has a feeling that her Mom may not want to go out to another MD appt for this reason but she will see what she wants to do and determine if she wants to make the appt and then go from there. Will f/u in approx 1 week.   BIANKA

## 2018-07-05 NOTE — PROGRESS NOTES
Patient has graduated from the Transitions of Care Coordination  program on 7-5-18. Patient's symptoms are stable at this time. Patient/family has the ability to self-manage. Care management goals have been completed at this time. No further nurse navigator follow up scheduled. Goals Addressed             Most Recent     mobility and safety   On track (6/26/2018)             6-5-18:  Discussed with Ms. Sepulveda about safety and mobility. Patient with hx of syncope, falls. Currently she denies any sob, chest pain or dizziness. She is s/p acute non displaced fx superior endplate L1 and proximal fibular fx with pain issues. She is using a RW for mobility. [facility] has been arranged to follow for SN/PT/OT services for f/u post inpatient rehab stay. Spoke with daughter today Cm Bond) and she expresses concern that Ms. Sepulveda has not started therapy as of yet since no therapy since Sunday and knows that her Mom will not move as much as she should due to pain issues without therapy working with her. Noted home care plans to start tomorrow. Patient also with ITZEL appt with PCP in am.  Will continue to follow up with call in approx 1 week. [facility]     6-12-18:  Per Ms. Drapergs, Ms. Sepulveda is mobilizing well with the walker and getting around the home. She has had no further falls at this time. She states her falls are not due to tripping over things but due to the sudden onset of passing out. These episodes just infrequently occur and she has had significant cardiac w/u and they have not find the reason behind this. She [facility] be [facility] for [facility] for nursing/therapy. Will f/u in approx 1 week. [facility]     6-21-18:  Per Ms. Sepulveda, she is mobilizing well with her walker around her home. She has had no falls since discharge from the hospital and has had no syncope events. She continues to have At Home care services and indicates they come 2x per week.   Will f/u with call in approx 1 week. [facility]     6-26-18:  Spoke with Jean-Paulr/Xiomy Coleman today (Permission to Disclose/4-28-17/verified with 2 identifiers). She states her Mother is currently out with caregiver at her ENT appt. She is being seen today for issues related to her Left eardrum. She mobilizes well so long as she uses her walker but she is often resistant to using the walker. She continues to have home care following her but per daughter, this should be their last week following her. She has had no further falls or syncope events. Will f/u in approx 1 week. [facility]     7-5-18:  Spoke with Dtr/Xiomy Coleman today (Permission to Disclose/4-28-17)/verified with 2 identifiers). She notes that home care has discontinued their services. Ms. Seuplveda is doing well. She is being safe with her walker and the nurse, before discharge, reinforced using the walker for safety. Her caregiver is there with her to remind her of this as well so Ms. Soo Love feels she realizes she really does need to use the walker now. She has had no further episodes of syncope or falls. She had her appt with the ENT and had some ear wax cleared out but will be going back week after next for further f/u. [facility]        Pain management   On track (6/26/2018)             6-5-18:  Spoke with Ms. Sepulveda today asking about her pain. She states her pain is about the same as it always is. She indicates she does have pain in her back and rt leg. She indicates she does take tramadol for pain but denies having lidoderm patches right now. I spoke with her daughter Rosendo Aguilar Jared/Permission to Disclose/all/4-28-17, verified with 2 identifiers) and she indicates that her Mom does have pain issues and that she had them while at [facility] for rehab and that the tramadol wasn't enough control and they had to add additional medication. However, when she was discharged yesterday, they did not give them a prescription for the lidoderm patches.   Ms. Sepulveda will be seeing  Doroteo Barajas tomorrow so she plans to discuss her pain management tomorrow and need for RX. Will continue to follow up with call in approx 1 week. [facility]     6-12-18:  Spoke with Eileen Coleman/shahid (Permission to Disclose/4-28-17/verified with 2 identifiers). She states her Mom's phone is out of service and has been since Sunday. She states she is doing well. She was able to reach the caregiver earlier this morning. Her pain levels are about 5/6 if she just takes tylenol and 3/4 if she is using the oxycodone. If she doesn't take any pain meds, it runs about 8/9 but the pain does not stop her from mobilizing as she is doing very well with that and using her walker to ambulate and has had not further falls. She indicates that they have not filled the lidoderm patches as there is an insurance issue currently as the pharmacy indicates there insurance will not fill without a Prior Auth from the MD so the pharmacy is working on that but per Ms. Coleman, she isn't concerned with the lidoderm patch either way as she feels her Mom is doing well between the tylenol and the oxycodone. Will f/u in approx 1 week. [facility]     6-21-18:  Spoke with Ms. Sepulveda today (verified with 2 identifiers). She states she is taking her pain medication and her pain levels usually are at about 7/8. She states she uses both tylenol and oxycodone but tries not to use the oxycodone that much. She indicates she does not use \"pain patches\" (as it was noted from my previous conversation with daughter that lidoderm patches required Prior authorization for approval). Spoke with PCP staff today and they are already working on Prior auth at this time. If Prior auth for lidoderm is denied, NN mentioned to PCP staff if MD would consider another alternative/comparible medication. Will continue to follow up with call in approx 1 week. [facility]     6-26-18:  Spoke with shahid/Xiomy Coleman re: pain management. I discussed Ms. Sepulveda comments about her pain from last week. Ms. Soo Love does not believe that Ms. Sepulveda really runs generally at a 7/8 pain scale. She notes that a lot of her views on pain relate to how her Dad (Mr. Sepulveda) is doing at the time. If he is not well, which he is having issues himself right now with kidney stones, and requiring a lot of care and time with Ms. Sepulveda, it affects her such as not getting a lot of sleep etc which affects her view on pain etc.  She was over checking on her on Sat/Sunday and she was doing well and only taking ES Tylenol not the oxycodone. She indicates the pain is probably more in the level of 3-4 for her. Ms. Soo Love also has been talking with caregiver on regular basis as well as the caregiver monitors her closely for pain too. Ms. Soo Love just found out about PCP recommendation to take her to [facility] for f/u and seeing about obtaining the lidoderm patches. Ms. Soo Love feels the patches may not be that beneficial to her now but wants to talk with Ms. Sepulveda about going to [facility]. She has a feeling that her Mom may not want to go out to another MD appt for this reason but she will see what she wants to do and determine if she wants to make the appt and then go from there. Will f/u in approx 1 week. [facility]     7-5-18:  Per Ms. Coleman, Ms. Sepulveda is doing much better with pain issues. She had told me last call she would be speaking with her Mom about whether or not she wanted to go to see Mahsa Cardenas MD for f/u and to see if she could obtain lidoderm patches but Ms. Sepulveda declined. She indicated she was feeling better and is using her tylenol. Per Ms. Drapergs, she is actually moving around better, the bone ache that she had is gone now and she just has the discomfort when moving about but can now get comfortable in a chair which she couldn't actually do before. She continues to mobilize around with the walker as well.   [facility]L             Pt has nurse navigator's contact information for any further questions, concerns, or needs.   Patients upcoming visits:  Future Appointments  Date Time Provider Mira Tiffanie   8/8/2018 1:00 PM Juanita Giron  [facility]   11/8/2018 10:30 AM Marcell Rainey MD [facility]   11/26/2018 10:30 AM Autumn Hayes MD RDE [facility]

## 2018-08-08 NOTE — PROGRESS NOTES
1. Have you been to the ER, urgent care clinic since your last visit? Hospitalized since your last visit? SEEN IN ER FOR FALL AND HURT BACK. 2. Have you seen or consulted any other health care providers outside of the [address] [place] [address] since your last visit? Include any pap smears or colon screening. NO.       Chief Complaint   Patient presents with    Other     6 MONTH-PT DENIES ANY CARDIAC SYMPTOMS

## 2018-08-08 NOTE — PROGRESS NOTES
[id number] [facility]        [phone]                             NEW PATIENT HPI/FOLLOW-UP    NAME:  Malcom Bustos   :   3/6/1932   MRN:   [id number]   PCP:  She Ramirez MD           Subjective: The patient is a 80y.o. year old female  who returns for a routine follow-up. Since the last visit, patient reports no new symptoms. Denies change in exercise tolerance, chest pain, edema, medication intolerance, palpitations, shortness of breath, PND/orthopnea wheezing, sputum, syncope, dizziness or light headedness. Doing satisfactorily. Review of Systems  General: Pt denies excessive weight gain or loss. Pt is able to conduct ADL's. Respiratory: Denies shortness of breath, CHOW, wheezing or stridor.   Cardiovascular: Denies precordial pain, palpitations, edema or PND  Gastrointestinal: Denies poor appetite, indigestion, abdominal pain or blood in stool  Peripheral vascular: Denies claudication, leg cramps  Neurological: Denies paresthesias, tingling.numbness  Psychiatric: Denies anxiety,depression,fatigue  Musculoskeletal: Denies pain,tenderness, soreness,swelling      Past Medical History:   Diagnosis Date    Acquired hypothyroidism 10/30/2017    Arthritis     Back pain 2011    Cancer ([address] 1993    colon    Chronic pain     in her back    Dementia     Mild; Neuropsych testing with Dr. Gino Dakin in     Dementia 2011    Epigastric pain 2011    Essential hypertension     Essential hypertension 2015    Hyperlipidemia 2011    Hypertension 2011    Hypothyroidism 2011    s/p VALDES in  and  for hyperthyroidism    Neuropathy 2011    Orthostasis 2012    Orthostasis 2012    Osteoporosis 2011    Pacemaker     Persistent disorder of initiating or maintaining sleep 2015    S/P cardiac pacemaker procedure 2011    SSS (sick sinus syndrome) ([address] 2011    Syncope     Syncope and collapse 8/19/2011    Urinary frequency 2/16/2011     Patient Active Problem List    Diagnosis Date Noted    Closed compression fracture of first lumbar vertebra ([facility] [address] 05/22/2018    Syncope and collapse 05/19/2018    Acquired hypothyroidism 10/30/2017    Stenosis of both internal carotid arteries 12/16/2016    Syncope 12/15/2016    Hypokalemia 12/15/2016    Essential hypertension 12/04/2015    Persistent disorder of initiating or maintaining sleep 04/22/2015    Anxiety disorder 12/04/2012    Vasovagal syndrome 07/11/2012    Syncope due to orthostatic hypotension 07/11/2012    Orthostasis 07/06/2012    Essential hypertension, benign 06/01/2012    Mobitz (type) II atrioventricular block 06/01/2012    Cardiac pacemaker in situ 06/01/2012    DJD (degenerative joint disease) of knee 01/25/2012    S/P cardiac pacemaker procedure 08/24/2011    SSS (sick sinus syndrome) ([facility] [address] 08/20/2011    Depression 07/21/2011    Dementia 07/21/2011    Personal history of colon cancer 06/23/2011    Epigastric pain 02/16/2011    Hyperlipidemia 02/16/2011    Neuropathy 02/16/2011    Postablative hypothyroidism 02/16/2011    Urinary frequency 02/16/2011    Osteoporosis 02/16/2011      Past Surgical History:   Procedure Laterality Date    HX GI  1993    colon resection    HX HEENT      tonsillectomy in 1950    HX PACEMAKER      NE ENTEROSCOPY > 2ND PRTN ILEUM CONTROL BLEEDING  6/23/2011          Allergies   Allergen Reactions    Amoxicillin Unknown (comments)    Aricept [Donepezil] Other (comments)     Bad dreams.     Augmentin [Amoxicillin-Pot Clavulanate] Unknown (comments)    Pcn [Penicillins] Rash    Zithromax [Azithromycin] Unknown (comments)      Family History   Problem Relation Age of Onset    Diabetes Mother     Thyroid Disease Mother     Heart Disease Mother     Hypertension Father     Heart Disease Father     Diabetes Sister     Diabetes Brother       Social History     Social History    Marital status:      Spouse name: N/A    Number of children: N/A    Years of education: N/A     Occupational History    Not on file. Social History Main Topics    Smoking status: Never Smoker    Smokeless tobacco: Never Used    Alcohol use No    Drug use: No    Sexual activity: Not on file     Other Topics Concern    Not on file     Social History Narrative    Lives in [facility] with  of 54 years. Has a son and daughter. Used to work as a  for [employer] and at [employer] firm. Likes to garden and craft. Current Outpatient Prescriptions   Medication Sig    alendronate (FOSAMAX) 70 mg tablet Take 70 mg by mouth every seven (7) days.  traMADol (ULTRAM) 50 mg tablet Take 50 mg by mouth every eight (8) hours as needed.  coenzyme q10 (CO Q-10) 10 mg cap Take  by mouth daily.  gabapentin (NEURONTIN) 300 mg capsule Take 300 mg by mouth three (3) times daily. Indications: 2 TABS TWICE DAILY    glucosamine-chondroitin (ARTHX) 500-400 mg cap Take 1 Cap by mouth daily.  b complex vitamins tablet Take 1 Tab by mouth daily.  SYNTHROID 75 mcg tablet TAKE 1 TABLET BY MOUTH ON  MONDAY THROUGH SATURDAY AND 1/2 TAB ON SUNDAY AT BEDTIME--BRAND MEDICALLY NECESSARY    escitalopram oxalate (LEXAPRO) 10 mg tablet TAKE 1 TABLET BY MOUTH  DAILY    atorvastatin (LIPITOR) 10 mg tablet Take 1 tablet by mouth  daily    calcium-cholecalciferol, D3, (CALTRATE 600+D) tablet Take 2 Tabs by mouth daily.  POLYETHYLENE GLYCOL 3350 (MIRALAX PO) Take  by mouth. 1-2   tsp daily    aspirin delayed-release 81 mg tablet Take 81 mg by mouth daily.  lidocaine (LIDODERM) 5 % 1 Patch by TransDERmal route every twenty-four (24) hours. Apply patch to the affected area for 12 hours a day and remove for 12 hours a day.  oxyCODONE IR (ROXICODONE) 5 mg immediate release tablet Take 1 Tab by mouth every six (6) hours as needed.  Max Daily Amount: 20 mg.    calcitonin, salmon, (MIACALCIN) nasal 1 Spray by IntraNASal route daily. No current facility-administered medications for this visit. I have reviewed the nurses notes, vitals, problem list, allergy list, medical history, family medical, social history and medications. Objective:     Physical Exam:     Vitals:    08/08/18 1356   BP: 126/86   Pulse: 75   Resp: 18   SpO2: 92%   Weight: 167 lb (75.8 kg)   Height: 5' 5\" (1.651 m)    Body mass index is 27.79 kg/(m^2). General: Well developed, in no acute distress. HEENT: No carotid bruits, no JVD, trach is midline. Heart:  Normal S1/S2 negative S3 or S4. Regular, no murmur, gallop or rub.   Respiratory: Clear bilaterally, no wheezing or rales  Abdomen:   Soft, non-tender, bowel sounds are active.   Extremities:  No edema, normal cap refill, no cyanosis. Neuro: A&Ox3, speech clear, gait stable. Skin: Skin color is normal. No rashes or lesions. No diaphoresis. Vascular: 2+ pulses symmetric in all extremities        Data Review:       Cardiographics:    EKG: V-paced. Cardiology Labs:    Results for orders placed or performed during the hospital encounter of 05/19/18   EKG, 12 LEAD, INITIAL   Result Value Ref Range    Ventricular Rate 75 BPM    Atrial Rate 75 BPM    P-R Interval 236 ms    QRS Duration 146 ms    Q-T Interval 416 ms    QTC Calculation (Bezet) 464 ms    Calculated R Axis -56 degrees    Calculated T Axis 101 degrees    Diagnosis       AV dual-paced rhythm with prolonged AV conduction  Abnormal ECG  When compared with ECG of 23-MAR-2018 21:34,  Vent.  rate has increased BY   3 BPM  Confirmed by Sharon Hopson ([id number]) on 5/20/2018 3:32:48 PM         Lab Results   Component Value Date/Time    Cholesterol, total 159 12/16/2016 03:01 AM    HDL Cholesterol 63 12/16/2016 03:01 AM    LDL, calculated 80.8 12/16/2016 03:01 AM    Triglyceride 76 12/16/2016 03:01 AM    CHOL/HDL Ratio 2.5 12/16/2016 03:01 AM       Lab Results   Component Value Date/Time    Sodium 136 05/29/2018 06:10 AM Potassium 4.1 05/29/2018 06:10 AM    Chloride 102 05/29/2018 06:10 AM    CO2 27 05/29/2018 06:10 AM    Anion gap 7 05/29/2018 06:10 AM    Glucose 87 05/29/2018 06:10 AM    BUN 16 05/29/2018 06:10 AM    Creatinine 0.80 05/29/2018 06:10 AM    BUN/Creatinine ratio 20 05/29/2018 06:10 AM    GFR est AA >60 05/29/2018 06:10 AM    GFR est non-AA >60 05/29/2018 06:10 AM    Calcium 9.0 05/29/2018 06:10 AM    Bilirubin, total 0.8 05/20/2018 04:48 AM    AST (SGOT) 25 05/20/2018 04:48 AM    Alk. phosphatase 71 05/20/2018 04:48 AM    Protein, total 6.5 05/20/2018 04:48 AM    Albumin 3.0 (L) 05/20/2018 04:48 AM    Globulin 3.5 05/20/2018 04:48 AM    A-G Ratio 0.9 (L) 05/20/2018 04:48 AM    ALT (SGPT) 25 05/20/2018 04:48 AM          Assessment:       ICD-10-CM ICD-9-CM    1. Essential hypertension I10 401.9 AMB POC EKG ROUTINE W/ 12 LEADS, INTER & REP   2. Stenosis of both internal carotid arteries I65.23 433.10      433.30    3. Cardiac pacemaker in situ Z95.0 V45.01          Discussion: Patient presents at this time with recurrent intermittent orthostatic hypotensive vasovagal syncopal spells. All BP meds were stopped by [facility] and vigilant stance advised. Difficult predicament. Hesitant to start vasoconstrictor such as Midodrine or add Florinef. Advised family member with her to continue to use preventative measures and to encourage roller walker use and delay start of walk after standing for few seconds. Plan: 1. Continue same meds. Lipid profile and labs followed by PCP. 2.Encouraged to exercise to tolerance, lose weight and follow low fat, low cholesterol, low sodium predominantly Plant-based (consider Mediterranean) diet. Call with questions or concerns. Will follow up any test results by phone and/or f/u here in office if needed. Ulises Lemon 3.Follow up: 6 MONTHS    I have discussed the diagnosis with the patient and the intended plan as seen in the above orders.   The patient has received an after-visit summary and questions were answered concerning future plans. I have discussed any concerning medication side effects and warnings with the patient as well.     Alan Soares MD  8/8/2018

## 2018-09-03 PROBLEM — M54.50 LOW BACK PAIN: Status: ACTIVE | Noted: 2018-01-01

## 2018-09-03 PROBLEM — R26.2 DIFFICULTY WALKING: Status: ACTIVE | Noted: 2018-01-01

## 2018-09-03 PROBLEM — S32.010A COMPRESSION FRACTURE OF L1 LUMBAR VERTEBRA (HCC): Status: ACTIVE | Noted: 2018-01-01

## 2018-09-03 NOTE — IP AVS SNAPSHOT
[address] [facility] 
[phone] Patient: Griffin Del Rosario MRN: [id number] :3/6/1932 About your hospitalization You were admitted on:  September 3, 2018 You last received care in the:  [facility] 3 ORTHOPEDICS You were discharged on:  2018 Why you were hospitalized Your primary diagnosis was:  Compression Fracture Of L1 Lumbar Vertebra (Hcc) Your diagnoses also included:  Low Back Pain, Difficulty Walking Follow-up Information Follow up With Details Comments Contact Info Ruby Ge MD Go on 2018 Hospital follow-up scheduled at ( If you have questions or need to reschedule please call [phone] [address] [facility] 
[phone] [address]  This is your post acute care provider  [address] [facility] 
[phone] Discharge Orders None A check bakari indicates which time of day the medication should be taken. My Medications START taking these medications Instructions Each Dose to Equal  
 Morning Noon Evening Bedtime  
 oxyCODONE IR 5 mg immediate release tablet Commonly known as:  Dortherené Tavera Your last dose was: Your next dose is: Take 1 Tab by mouth every four (4) hours as needed. Max Daily Amount: 30 mg.  
 5 mg  
    
   
   
   
  
 senna-docusate 8.6-50 mg per tablet Commonly known as:  Leellen Cancel Your last dose was: Your next dose is: Take 1 Tab by mouth daily. 1 Tab CHANGE how you take these medications Instructions Each Dose to Equal  
 Morning Noon Evening Bedtime  
 atorvastatin 10 mg tablet Commonly known as:  LIPITOR What changed:  See the new instructions. Your last dose was: Your next dose is: Take 1 tablet by mouth  daily escitalopram oxalate 10 mg tablet Commonly known as:  Jean Osuna What changed:  See the new instructions. Your last dose was: Your next dose is: TAKE 1 TABLET BY MOUTH  DAILY CONTINUE taking these medications Instructions Each Dose to Equal  
 Morning Noon Evening Bedtime  
 alendronate 70 mg tablet Commonly known as:  FOSAMAX Your last dose was: Your next dose is: Take 70 mg by mouth every seven (7) days. 70 mg  
    
   
   
   
  
 aspirin delayed-release 81 mg tablet Your last dose was: Your next dose is: Take 81 mg by mouth daily. 81 mg  
    
   
   
   
  
 b complex vitamins tablet Your last dose was: Your next dose is: Take 1 Tab by mouth daily. 1 Tab  
    
   
   
   
  
 calcium-cholecalciferol (D3) tablet Commonly known as:  CALTRATE 600+D Your last dose was: Your next dose is: Take 2 Tabs by mouth daily. 2 Tab  
    
   
   
   
  
 CO Q-10 10 mg Cap Generic drug:  coenzyme q10 Your last dose was: Your next dose is: Take 1 Tab by mouth every evening. 1 Tab * gabapentin 300 mg capsule Commonly known as:  NEURONTIN Your last dose was: Your next dose is: Take 600 mg by mouth daily. Indications: 2 TABS TWICE DAILY 600 mg  
    
   
   
   
  
 * gabapentin 300 mg capsule Commonly known as:  NEURONTIN Your last dose was: Your next dose is: Take 300 mg by mouth every evening. 300 mg  
    
   
   
   
  
 glucosamine-chondroitin 500-400 mg Cap Commonly known as:  [address] [facility] Your last dose was: Your next dose is: Take 1 Cap by mouth two (2) times a day. 1 Cap MIRALAX PO Your last dose was: Your next dose is: Take  by mouth. 1-2  tsp daily SYNTHROID 75 mcg tablet Generic drug:  levothyroxine Your last dose was: Your next dose is: TAKE 1 TABLET BY MOUTH ON  MONDAY THROUGH SATURDAY AND 1/2 TAB ON SUNDAY AT [facility] MEDICALLY NECESSARY * Notice: This list has 2 medication(s) that are the same as other medications prescribed for you. Read the directions carefully, and ask your doctor or other care provider to review them with you. STOP taking these medications   
 traMADol 50 mg tablet Commonly known as:  ULTRAM  
   
  
  
  
Where to Get Your Medications These medications were sent to 3 [facility], [address] [place] [address] Phone:  [phone]  
  senna-docusate 8.6-50 mg per tablet Information on where to get these meds will be given to you by the nurse or doctor. ! Ask your nurse or doctor about these medications  
  oxyCODONE IR 5 mg immediate release tablet Opioid Education Prescription Opioids: What You Need to Know: 
 
Prescription opioids can be used to help relieve moderate-to-severe pain and are often prescribed following a surgery or injury, or for certain health conditions. These medications can be an important part of treatment but also come with serious risks. Opioids are strong pain medicines. Examples include hydrocodone, oxycodone, fentanyl, and morphine. Heroin is an example of an illegal opioid. It is important to work with your health care provider to make sure you are getting the safest, most effective care. WHAT ARE THE RISKS AND SIDE EFFECTS OF OPIOID USE? Prescription opioids carry serious risks of addiction and overdose, especially with prolonged use. An opioid overdose, often marked by slow breathing, can cause sudden death.   The use of prescription opioids can have a number of side effects as well, even when taken as directed. · Tolerance-meaning you might need to take more of a medication for the same pain relief · Physical dependence-meaning you have symptoms of withdrawal when the medication is stopped. Withdrawal symptoms can include nausea, sweating, chills, diarrhea, stomach cramps, and muscle aches. Withdrawal can last up to several weeks, depending on which drug you took and how long you took it. · Increased sensitivity to pain · Constipation · Nausea, vomiting, and dry mouth · Sleepiness and dizziness · Confusion · Depression · Low levels of testosterone that can result in lower sex drive, energy, and strength · Itching and sweating RISKS ARE GREATER WITH:      
· History of drug misuse, substance use disorder, or overdose · Mental health conditions (such as depression or anxiety) · Sleep apnea · Older age (72 years or older) · Pregnancy Avoid alcohol while taking prescription opioids. Also, unless specifically advised by your health care provider, medications to avoid include: · Benzodiazepines (such as Xanax or Valium) · Muscle relaxants (such as Soma or Flexeril) · Hypnotics (such as Ambien or Lunesta) · Other prescription opioids KNOW YOUR OPTIONS Talk to your health care provider about ways to manage your pain that don't involve prescription opioids. Some of these options may actually work better and have fewer risks and side effects. Options may include: 
· Pain relievers such as acetaminophen, ibuprofen, and naproxen · Some medications that are also used for depression or seizures · Physical therapy and exercise · Counseling to help patients learn how to cope better with triggers of pain and stress. · Application of heat or cold compress · Massage therapy · Relaxation techniques Be Informed Make sure you know the name of your medication, how much and how often to take it, and its potential risks & side effects. IF YOU ARE PRESCRIBED OPIOIDS FOR PAIN: 
· Never take opioids in greater amounts or more often than prescribed. Remember the goal is not to be pain-free but to manage your pain at a tolerable level. · Follow up with your primary care provider to: · Work together to create a plan on how to manage your pain. · Talk about ways to help manage your pain that don't involve prescription opioids. · Talk about any and all concerns and side effects. · Help prevent misuse and abuse. · Never sell or share prescription opioids · Help prevent misuse and abuse. · Store prescription opioids in a secure place and out of reach of others (this may include visitors, children, friends, and family). · Safely dispose of unused/unwanted prescription opioids: Find your community drug take-back program or your pharmacy mail-back program, or flush them down the toilet, following guidance from the Food and Drug Administration (www.fda.gov/Drugs/ResourcesForYou). · Visit www.cdc.gov/drugoverdose to learn about the risks of opioid abuse and overdose. · If you believe you may be struggling with addiction, tell your health care provider and ask for guidance or call [facility] at 7-746-743-ADFD. Discharge Instructions Back Pain: Care Instructions Your Care Instructions Back pain has many possible causes. It is often related to problems with muscles and ligaments of the back. It may also be related to problems with the nerves, discs, or bones of the back. Moving, lifting, standing, sitting, or sleeping in an awkward way can strain the back. Sometimes you don't notice the injury until later. Arthritis is another common cause of back pain. Although it may hurt a lot, back pain usually improves on its own within several weeks. Most people recover in 12 weeks or less.  Using good home treatment and being careful not to stress your back can help you feel better sooner. Follow-up care is a key part of your treatment and safety. Be sure to make and go to all appointments, and call your doctor if you are having problems. It's also a good idea to know your test results and keep a list of the medicines you take. How can you care for yourself at home? · Sit or lie in positions that are most comfortable and reduce your pain. Try one of these positions when you lie down: ¨ Lie on your back with your knees bent and supported by large pillows. ¨ Lie on the floor with your legs on the seat of a sofa or chair. Nura Miramontes on your side with your knees and hips bent and a pillow between your legs. ¨ Lie on your stomach if it does not make pain worse. · Do not sit up in bed, and avoid soft couches and twisted positions. Bed rest can help relieve pain at first, but it delays healing. Avoid bed rest after the first day of back pain. · Change positions every 30 minutes. If you must sit for long periods of time, take breaks from sitting. Get up and walk around, or lie in a comfortable position. · Try using a heating pad on a low or medium setting for 15 to 20 minutes every 2 or 3 hours. Try a warm shower in place of one session with the heating pad. · You can also try an ice pack for 10 to 15 minutes every 2 to 3 hours. Put a thin cloth between the ice pack and your skin. · Take pain medicines exactly as directed. ¨ If the doctor gave you a prescription medicine for pain, take it as prescribed. ¨ If you are not taking a prescription pain medicine, ask your doctor if you can take an over-the-counter medicine. · Take short walks several times a day. You can start with 5 to 10 minutes, 3 or 4 times a day, and work up to longer walks. Walk on level surfaces and avoid hills and stairs until your back is better. · Return to work and other activities as soon as you can.  Continued rest without activity is usually not good for your back. · To prevent future back pain, do exercises to stretch and strengthen your back and stomach. Learn how to use good posture, safe lifting techniques, and proper body mechanics. When should you call for help? Call your doctor now or seek immediate medical care if: 
  · You have new or worsening numbness in your legs.  
  · You have new or worsening weakness in your legs. (This could make it hard to stand up.)  
  · You lose control of your bladder or bowels.  
 Watch closely for changes in your health, and be sure to contact your doctor if: 
  · You have a fever, lose weight, or don't feel well.  
  · You do not get better as expected. Where can you learn more? Go to http://vikash-maria g.info/. Enter G835 in the search box to learn more about \"Back Pain: Care Instructions. \" Current as of: 2017 Content Version: 11.7 © 9130-9208 [employer] Class[employer]. Care instructions adapted under license by [employer] (which disclaims liability or warranty for this information). If you have questions about a medical condition or this instruction, always ask your healthcare professional. [address] [address] any warranty or liability for your use of this information. PATIENT DISCHARGE INSTRUCTIONS PATIENT DISCHARGE INSTRUCTIONS Griffin Del Rosario / [id number] : 3/6/1932 Admitted 9/3/2018 Discharged: 2018 · It is important that you take the medication exactly as they are prescribed. · Keep your medication in the bottles provided by the pharmacist and keep a list of the medication names, dosages, and times to be taken in your wallet. · Do not take other medications without consulting your doctor. What to do at [facility] Recommended Diet: Cardiac Diet Recommended Activity: PT/OT Eval and Treat Signed By: Ruby Ge MD   
 2018 ACO Transitions of Care Introducing [employer] [address] offers a voluntary care coordination program to provide high quality service and care to [facility] fee-for-service beneficiaries. Thais Purvis was designed to help you enhance your health and well-being through the following services: ? Transitions of Care  support for individuals who are transitioning from one care setting to another (example: Hospital to home). ? Chronic and Complex Care Coordination  support for individuals and caregivers of those with serious or chronic illnesses or with more than one chronic (ongoing) condition and those who take a number of different medications. If you meet specific medical criteria, a [address] [place] [address] Rd may call you directly to coordinate your care with your primary care physician and your other care providers. For questions about the [facility] programs, please, contact your physicians office. For general questions or additional information about Accountable Care Organizations: 
Please visit www.medicare.gov/acos. html or call 1-800-MEDICARE (5-190.520.5852) TTY users should call 7-350.259.6736. CyberSense Announcement We are excited to announce that we are making your provider's discharge notes available to you in CyberSense. You will see these notes when they are completed and signed by the physician that discharged you from your recent hospital stay. If you have any questions or concerns about any information you see in CyberSense, please call the Health Information Department where you were seen or reach out to your Primary Care Provider for more information about your plan of care. Introducing [facility]! Dear Tarik Nazario: 
Thank you for requesting a CyberSense account. Our records indicate that you already have an active CyberSense account. You can access your account anytime at https://[employer] Domain [email or web address]. SynapSense/[employer] Did you know that you can access your hospital and ER discharge instructions at any time in Metafor Software? You can also review all of your test results from your hospital stay or ER visit. Additional Information If you have questions, please visit the Frequently Asked Questions section of the Entell[employer]t website at [email or web address] Remember, Metafor Software is NOT to be used for urgent needs. For medical emergencies, dial 911. Now available from your iPhone and Android! Introducing Long Perry As a [facility] patient, I wanted to make you aware of our electronic visit tool called Long Perry. RemoteReality/[employer] allows you to connect within minutes with a medical provider 24 hours a day, seven days a week via a mobile device or tablet or logging into a secure website from your computer. You can access Long Perry from anywhere in the [place]. A virtual visit might be right for you when you have a simple condition and feel like you just dont want to get out of bed, or cant get away from work for an appointment, when your regular [facility] provider is not available (evenings, weekends or holidays), or when youre out of town and need minor care. Electronic visits cost only $49 and if the RemoteReality/[employer] provider determines a prescription is needed to treat your condition, one can be electronically transmitted to a nearby pharmacy*. Please take a moment to enroll today if you have not already done so. The enrollment process is free and takes just a few minutes. To enroll, please download the RemoteReality/[employer] lionel to your tablet or phone, or visit [email or web address] to enroll on your computer. And, as an [address] [place] [address] patient with a [employer] account, the results of your visits will be scanned into your electronic medical record and your primary care provider will be able to view the scanned results. We urge you to continue to see your regular [facility] provider for your ongoing medical care. And while your primary care provider may not be the one available when you seek a Long Peguerofeliciafin virtual visit, the peace of mind you get from getting a real diagnosis real time can be priceless. For more information on Long Peguerofeliciafin, view our Frequently Asked Questions (FAQs) at [email or web address]. Sincerely, 
 
Kennedy Olmos MD 
Chief Medical Officer [employer] *:  certain medications cannot be prescribed via Long Peguerofeliciafin Providers Seen During Your Hospitalization Provider Specialty Primary office phone [facility]. Annabel Cabello MD Emergency Medicine [phone] She Ramirez MD Internal Medicine [phone] Your Primary Care Physician (PCP) Primary Care Physician Office Phone Office Fax Ellyn Phelps [phone] You are allergic to the following Allergen Reactions Amoxicillin Unknown (comments) Aricept (Donepezil) Other (comments) Bad dreams. Augmentin (Amoxicillin-Pot Clavulanate) Unknown (comments) Pcn (Penicillins) Rash Zithromax (Azithromycin) Unknown (comments) Recent Documentation Height Weight BMI OB Status Smoking Status 1.651 m 74.8 kg 27.46 kg/m2 Postmenopausal Never Smoker Emergency Contacts Name Discharge Info Relation Home Work Mobile [facility] DISCHARGE CAREGIVER [3] Child [2] [phone] [id number] Patient Belongings The following personal items are in your possession at time of discharge: 
     Visual Aid: Glasses   Hearing Aids/Status: Bilateral                   
 
  
  
 Please provide this summary of care documentation to your next provider. Signatures-by signing, you are acknowledging that this After Visit Summary has been reviewed with you and you have received a copy. Patient Signature:  ____________________________________________________________ Date:  ____________________________________________________________  
  
Janyth Mins Provider Signature:  ____________________________________________________________ Date:  ____________________________________________________________

## 2018-09-03 NOTE — ED PROVIDER NOTES
EMERGENCY DEPARTMENT HISTORY AND PHYSICAL EXAM 
 
 
Date: 9/3/2018 Patient Name: Malcom Bustos History of Presenting Illness Chief Complaint Patient presents with  Fall  
  EMS reports GLF 12 days ago, pt with increased lower back pain since fall History Provided By: Patient HPI: Malcom Bustos, 80 y.o. female with PMHx significant for HTN, HLD, hypothyroidism, dementia, colon CA s/p colon resection, SSS s/p pacemaker placement, presents via EMS to the ED with cc of gradually worsening lower back pain x 4 days. Her pain is exacerbated by any sort of movement. Pt fell 12 days ago when using her walker, noting that she tripped over something and fell forward when she was ambulating. Her pain was manageable after the fall, but she notes that on 8/31, her lower back pain began worsening, to the point where she was barely able to ambulate by herself today, so her daughter called EMS. Pt was capable of assisting EMS with transferring herself to the stretcher. Pt is unsure if she has seen her PCP since her fall. Pt did take 1g of OTC tylenol this morning for her pain. She denies BL hip pain. History is limited due to pt's hx of dementia PCP: She Ramirez MD 
 
Current Outpatient Prescriptions Medication Sig Dispense Refill  alendronate (FOSAMAX) 70 mg tablet Take 70 mg by mouth every seven (7) days.  traMADol (ULTRAM) 50 mg tablet Take 50 mg by mouth every eight (8) hours as needed.  coenzyme q10 (CO Q-10) 10 mg cap Take  by mouth daily.  gabapentin (NEURONTIN) 300 mg capsule Take 300 mg by mouth three (3) times daily. Indications: 2 TABS TWICE DAILY  glucosamine-chondroitin (ARTHX) 500-400 mg cap Take 1 Cap by mouth daily.  b complex vitamins tablet Take 1 Tab by mouth daily.  lidocaine (LIDODERM) 5 % 1 Patch by TransDERmal route every twenty-four (24) hours. Apply patch to the affected area for 12 hours a day and remove for 12 hours a day.  30 Each 5  
  oxyCODONE IR (ROXICODONE) 5 mg immediate release tablet Take 1 Tab by mouth every six (6) hours as needed. Max Daily Amount: 20 mg. 30 Tab 0  
 calcitonin, salmon, (MIACALCIN) nasal 1 [place] by IntraNASal route daily.  SYNTHROID 75 mcg tablet TAKE 1 TABLET BY MOUTH ON  MONDAY THROUGH SATURDAY AND 1/2 TAB ON SUNDAY AT BEDTIME--[facility] MEDICALLY NECESSARY 85 Tab 3  
 escitalopram oxalate (LEXAPRO) 10 mg tablet TAKE 1 TABLET BY MOUTH  DAILY 90 Tab 3  
 atorvastatin (LIPITOR) 10 mg tablet Take 1 tablet by mouth  daily 90 Tab 3  
 calcium-cholecalciferol, D3, (CALTRATE 600+D) tablet Take 2 Tabs by mouth daily.  POLYETHYLENE GLYCOL 3350 (MIRALAX PO) Take  by mouth. 1-2 
 tsp daily  aspirin delayed-release 81 mg tablet Take 81 mg by mouth daily. Past History Past Medical History: 
Past Medical History:  
Diagnosis Date  Acquired hypothyroidism 10/30/2017  Arthritis  Back pain 2/16/2011  Cancer [facility]) 1993  
 colon  Chronic pain   
 in her back  Dementia 2011 Mild; Neuropsych testing with Dr. Kerwin Beasley in 2011  Dementia 7/21/2011  Epigastric pain 2/16/2011  Essential hypertension  Essential hypertension 12/4/2015  Hyperlipidemia 2/16/2011  Hypertension 2/16/2011  Hypothyroidism 2/16/2011  
 s/p VALDES in 1998 and 1999 for hyperthyroidism  Neuropathy 2/16/2011  Orthostasis 7/6/2012  Orthostasis 7/6/2012  Osteoporosis 2/16/2011  Pacemaker  Persistent disorder of initiating or maintaining sleep 4/22/2015  S/P cardiac pacemaker procedure 8/24/2011  
 SSS (sick sinus syndrome) ([facility] [address] 8/20/2011  Syncope  Syncope and collapse 8/19/2011  Urinary frequency 2/16/2011 Past Surgical History: 
Past Surgical History:  
Procedure Laterality Date  HX GI  1993  
 colon resection  HX HEENT    
 tonsillectomy in 1950  HX PACEMAKER    
 TX ENTEROSCOPY > 2ND PRTN ILEUM CONTROL BLEEDING  6/23/2011 Family History: Family History Problem Relation Age of Onset  Diabetes Mother  Thyroid Disease Mother  Heart Disease Mother  Hypertension Father  Heart Disease Father  Diabetes Sister  Diabetes Brother Social History: 
Social History Substance Use Topics  Smoking status: Never Smoker  Smokeless tobacco: Never Used  Alcohol use No  
 
 
Allergies: Allergies Allergen Reactions  Amoxicillin Unknown (comments)  Aricept [Donepezil] Other (comments) Bad dreams.  Augmentin [Amoxicillin-Pot Clavulanate] Unknown (comments)  Pcn [Penicillins] Rash  Zithromax [Azithromycin] Unknown (comments) Review of Systems Review of Systems Unable to perform ROS: Dementia Physical Exam  
Physical Exam  
Constitutional: She appears well-developed and well-nourished.  
elderly pleasant female HENT:  
Head: Normocephalic and atraumatic. Mouth/Throat: Oropharynx is clear and moist.  
Eyes: Conjunctivae and EOM are normal. Pupils are equal, round, and reactive to light. Right eye exhibits no discharge. Left eye exhibits no discharge. Neck: Normal range of motion. Neck supple. Cardiovascular: Normal rate, regular rhythm and normal heart sounds. No murmur heard. Pulmonary/Chest: Effort normal and breath sounds normal. No respiratory distress. She has no wheezes. She has no rales. Abdominal: Soft. Bowel sounds are normal. She exhibits no distension. There is no tenderness. Musculoskeletal: Normal range of motion. She exhibits no edema. Tenderness starting around L2 down to coccyx without obvious muscle/CVA tenderness Neurological: No cranial nerve deficit. She exhibits normal muscle tone. Orientation to self and place, but disoriented to situation and events Skin: Skin is warm and dry. No rash noted. She is not diaphoretic. Nursing note and vitals reviewed. Diagnostic Study Results Labs - Recent Results (from the past 12 hour(s)) CBC WITH AUTOMATED DIFF Collection Time: 09/03/18 11:55 AM  
Result Value Ref Range WBC 5.8 3.6 - 11.0 K/uL  
 RBC 4.03 3.80 - 5.20 M/uL  
 HGB 12.7 11.5 - 16.0 g/dL HCT 37.9 35.0 - 47.0 % MCV 94.0 80.0 - 99.0 FL  
 MCH 31.5 26.0 - 34.0 PG  
 MCHC 33.5 30.0 - 36.5 g/dL  
 RDW 13.0 11.5 - 14.5 % PLATELET 057 456 - 256 K/uL MPV 9.5 8.9 - 12.9 FL  
 NRBC 0.0 0  WBC ABSOLUTE NRBC 0.00 0.00 - 0.01 K/uL NEUTROPHILS 76 (H) 32 - 75 % LYMPHOCYTES 14 12 - 49 % MONOCYTES 7 5 - 13 % EOSINOPHILS 2 0 - 7 % BASOPHILS 1 0 - 1 % IMMATURE GRANULOCYTES 0 0.0 - 0.5 % ABS. NEUTROPHILS 4.4 1.8 - 8.0 K/UL  
 ABS. LYMPHOCYTES 0.8 0.8 - 3.5 K/UL  
 ABS. MONOCYTES 0.4 0.0 - 1.0 K/UL  
 ABS. EOSINOPHILS 0.1 0.0 - 0.4 K/UL  
 ABS. BASOPHILS 0.1 0.0 - 0.1 K/UL  
 ABS. IMM. GRANS. 0.0 0.00 - 0.04 K/UL  
 DF SMEAR SCANNED    
 RBC COMMENTS NORMOCYTIC, NORMOCHROMIC METABOLIC PANEL, BASIC Collection Time: 09/03/18 11:55 AM  
Result Value Ref Range Sodium 139 136 - 145 mmol/L Potassium 4.0 3.5 - 5.1 mmol/L Chloride 104 97 - 108 mmol/L  
 CO2 30 21 - 32 mmol/L Anion gap 5 5 - 15 mmol/L Glucose 93 65 - 100 mg/dL BUN 21 (H) 6 - 20 MG/DL Creatinine 0.72 0.55 - 1.02 MG/DL  
 BUN/Creatinine ratio 29 (H) 12 - 20 GFR est AA >60 >60 ml/min/1.73m2 GFR est non-AA >60 >60 ml/min/1.73m2 Calcium 8.6 8.5 - 10.1 MG/DL Radiologic Studies - Initial Result:  
  Impression:  
  IMPRESSION:   
1. Interval compression of L1 with approximately 2/3rds loss of vertebral body 
height (acute fracture of L1 was identified 5/19/2018). The acuity of the 
compression is indeterminate. . 
 
 
  
  Narrative:  
   
INDICATION:    
Additional history: Lower back pain after fall. COMPARISON: CT of the lumbar spine, 5/19/2018 Limitations: Osteopenia limits evaluation. Maurice Shillings  
FINDINGS:  
AP, lateral and spot lateral views of the lumbar spine demonstrate interval 
 compression of L1 with approximately 2/3rds loss of vertebral body height. Degenerative disc disease at L5/S1 with degenerative changes in the posterior 
elements about L4 and L5. Vertebroplasty at T11.  There is no visible fracture 
or subluxation. Osteopenia. Calcifications throughout the aorta. Chain suture 
material in the pelvis. .  
 
Initial Result:  
  Impression:  
  IMPRESSION:  
1. No visible displaced fracture. 
  
  Narrative:  
  EXAM:  XR SACRUM AND COCCYX INDICATION: Trauma. COMPARISON: None. FINDINGS: AP and lateral views of the sacrum and coccyx demonstrate no visible 
displaced fracture. The sacrum is largely obscured by enteric content on the 
frontal view. Degenerative changes at the pubic symphysis. The pubic symphysis 
is somewhat obscured by enteric content as well. Victoria Vane suture material in the 
pelvis. Osteopenia. Medical Decision Making I am the first provider for this patient. I reviewed the vital signs, available nursing notes, past medical history, past surgical history, family history and social history. Vital Signs-Reviewed the patient's vital signs. Patient Vitals for the past 12 hrs: 
 Temp Pulse Resp BP SpO2  
09/03/18 1200 - - - 143/75 96 % 09/03/18 1146 - - - 149/87 -  
09/03/18 1108 - - - (!) 160/96 96 % 09/03/18 1001 98 °F (36.7 °C) 75 17 173/68 100 % Pulse Oximetry Analysis - 100% on RA Cardiac Monitor:  
Rate: 75 bpm 
Rhythm: Normal Sinus Rhythm Records Reviewed: Nursing Notes and Old Medical Records Provider Notes (Medical Decision Making): Mechanical fall 2 weeks ago with increasing back pain. Possible L spine fx. ED Course:  
Initial assessment performed. The patients presenting problems have been discussed, and they are in agreement with the care plan formulated and outlined with them. I have encouraged them to ask questions as they arise throughout their visit.  
 
11:08 AM 
 Daughter at bedside, not worried about pt being weak/more confused, noting that she is at her baseline. States that nerve type pain has been bothering her since Friday. Pain was bad for a few days after the fall but then got much better. However, she has been complaining of non-stop pain for 3 days. Does report hx of L spine compression fx in May 2018 with admission to ortho. Unable to have an MRI as she has a pacemaker. BP currently improving at 160/90. Written by Sade Ha ED Scribe as dictated by Edis Young MD 
 
CONSULT NOTE:  
12:51 PM 
Edis Young MD spoke with Dr. Jordin Cole, Specialty: Hospitalist 
Discussed pt's hx, disposition, and available diagnostic and imaging results. Reviewed care plans. Consultant will evaluate pt for admission. Written by Sade Ha, ED Scribe, as dictated by Edis Young MD. Disposition: PLAN: Admit to Hospitalist 
 
12:52 PM 
Patient is being admitted to the hospital by Dr. Jordin Cole. The results of their tests and reasons for their admission have been discussed with them and/or available family. They convey agreement and understanding for the need to be admitted and for their admission diagnosis. Written by Evan Marquez, ED Scribe, as dictated by Edis Young MD. Diagnosis Clinical Impression: 1. Closed compression fracture of first lumbar vertebra, initial encounter ([facility] [address] Attestations: This note is prepared by Sade Ha acting as scribe for MD Edis Leon MD : The scribe's documentation has been prepared under my direction and personally reviewed by me in its entirety. I confirm that the note above accurately reflects all work, treatment, procedures, and medical decision making performed by me.

## 2018-09-03 NOTE — ED NOTES
TRANSFER - OUT REPORT: 
 
Verbal report given to Kenny Pardo RN (name) on [place]  being transferred to Ortho (unit) for routine progression of care Report consisted of patients Situation, Background, Assessment and  
Recommendations(SBAR). Information from the following report(s) SBAR, Kardex, ED Summary, MAR, Accordion and Recent Results was reviewed with the receiving nurse. Lines:    
 
Opportunity for questions and clarification was provided. Patient transported with: 
 [employer]

## 2018-09-03 NOTE — H&P
Hospitalist Admission Note NAME: Etienne House :  3/6/1932 MRN:  [id number] Date/Time:  9/3/2018 2:36 PM 
 
Patient PCP: Sugar Liu MD 
______________________________________________________________________ Assessment & Plan: 
Acute on chronic low back pain 
Acute on chronic L1 compression fracture, POA Acute fracture T12 Osteoporosis Hx T11 compression fracture with kyphoplasty 2016 
--L1 compression fracture in 2018 from fall with syncope, went to rehab. Fell 12 days ago with subsequent worsened back pain, requiring tramadol and in past 4 days acutely worsening pain, difficulty walking due to pain. --xray L-spine today with interval 2/3 loss of height in L1 compression fracture. Acute L1 compression fx identified . CT lumbar spine shows:  Acute on chronic compression fracture of the L1 vertebral body, with acute 
component new since 2018. There is now approximately 80% height loss 
centrally, and retropulsion of the superior endplate resulting in mild spinal 
canal stenosis. 2. Acute minimally displaced fracture of the T12 vertebral body with disruption 
of the anterior cortex. No significant height loss. 3. Partially visualized chronic compression deformity with vertebroplasty cement 
of the T11 vertebral body. --point tenderness in this region to palpation. Will put on tylenol 650mg q6h ATC with oxycodone 5mg q4hrs prn and morphine 1mg IV q4h prn. Has tried lidocaine patch in past without relief. --consult IR in AM to eval for kyphoplasty. NPO after midnight for possible kyphoplasty. --continue miralax, add colace 100mg bid 
--continue neurontin SSS s/p pacemaker --cannot have mri due to pacemaker Osteoporosis --hold fosamax in hospital 
 
Hypothyroid --continue levothyroxine Hyperlipidemia 
--continue lipitor Anxiety 
--continue lexapro Body mass index is 27.46 kg/(m^2). Code: d/w daughter, full DVT prophylaxis: SCD 
 Surrogate decision maker:  Daughter David Peralta Subjective: CHIEF COMPLAINT:  Low back pain, difficulty walking HISTORY OF PRESENT ILLNESS:    
Negrita Dillon is a 80 y.o.  female with PMH mild dementia, anxiety, osteoporosis, hx T11 compression fx s/p kyphoplasty, who presents with complaint noted above. History from daughter David Peralta at bedside. Patient had L1 acute compression fracture due to syncope and fall in May. Went to [facility] for rehab, no kyphoplasty. She tripped and fell 12 days ago. 3-4 days after fall developed low back pain. Pain worse when moving trunk, no leg pain, tingling or numbness. Uses walker at baseline. Since that time, been taking tramadol q6hrs for pain with some relief. However on Friday night, low back pain suddenly worse without further falling. Daughter stopped tramadol and tried roxicodone left over from May but pain progressively worse. Patient crying, not able to sleep last night, difficulty walking. Lives at home with  who has Alzheimer's dementia requiring home aides. Daughter report increased urinary incontinence from baseline in past 2 days (wears incontinence pad baseline but those have been getting wet). No fever, chills, dysuria, hematuria. No leg weakness, numbness or pain in legs. No CP, abdominal pain, n/v, constipation. We were asked to admit for work up and evaluation of the above problems. Past Medical History:  
Diagnosis Date  Acquired hypothyroidism 10/30/2017  Arthritis  Back pain 2/16/2011  Cancer [facility]) 1993  
 colon  Chronic pain   
 in her back  Dementia 2011 Mild; Neuropsych testing with Dr. Peterson Taylor in 2011  Dementia 7/21/2011  Epigastric pain 2/16/2011  Essential hypertension  Essential hypertension 12/4/2015  Hyperlipidemia 2/16/2011  Hypertension 2/16/2011  Hypothyroidism 2/16/2011  
 s/p VALDES in 1998 and 1999 for hyperthyroidism  Neuropathy 2/16/2011  Orthostasis 7/6/2012  Orthostasis 7/6/2012  Osteoporosis 2/16/2011  Pacemaker  Persistent disorder of initiating or maintaining sleep 4/22/2015  S/P cardiac pacemaker procedure 8/24/2011  
 SSS (sick sinus syndrome) ([facility] [address] 8/20/2011  Syncope  Syncope and collapse 8/19/2011  Urinary frequency 2/16/2011 Past Surgical History:  
Procedure Laterality Date  HX GI  1993  
 colon resection  HX HEENT    
 tonsillectomy in 1950  HX PACEMAKER    
 AL ENTEROSCOPY > 2ND PRTN ILEUM CONTROL BLEEDING  6/23/2011 Social History Substance Use Topics  Smoking status: Never Smoker  Smokeless tobacco: Never Used  Alcohol use No  
 , walker baseline Family History Problem Relation Age of Onset  Diabetes Mother  Thyroid Disease Mother  Heart Disease Mother  Hypertension Father  Heart Disease Father  Diabetes Sister  Diabetes Brother Allergies Allergen Reactions  Amoxicillin Unknown (comments)  Aricept [Donepezil] Other (comments) Bad dreams.  Augmentin [Amoxicillin-Pot Clavulanate] Unknown (comments)  Pcn [Penicillins] Rash  Zithromax [Azithromycin] Unknown (comments) Prior to Admission medications Medication Sig Start Date End Date Taking? Authorizing Provider  
alendronate (FOSAMAX) 70 mg tablet Take 70 mg by mouth every seven (7) days. 3/5/18   Historical Provider  
traMADol (ULTRAM) 50 mg tablet Take 50 mg by mouth every eight (8) hours as needed. Historical Provider  
coenzyme q10 (CO Q-10) 10 mg cap Take  by mouth daily. Historical Provider  
gabapentin (NEURONTIN) 300 mg capsule Take 300 mg by mouth three (3) times daily. Indications: 2 TABS TWICE DAILY    Historical Provider  
glucosamine-chondroitin (ARTHX) 500-400 mg cap Take 1 Cap by mouth daily. Historical Provider  
b complex vitamins tablet Take 1 Tab by mouth daily. Historical Provider lidocaine (LIDODERM) 5 % 1 Patch by TransDERmal route every twenty-four (24) hours. Apply patch to the affected area for 12 hours a day and remove for 12 hours a day. 6/6/18   Zaira Bella MD  
oxyCODONE IR (ROXICODONE) 5 mg immediate release tablet Take 1 Tab by mouth every six (6) hours as needed. Max Daily Amount: 20 mg. 6/6/18   Zaira Bella MD  
calcitonin, salmon, (MIACALCIN) nasal 1 [place] by IntraNASal route daily. Historical Provider SYNTHROID 75 mcg tablet TAKE 1 TABLET BY MOUTH ON  MONDAY THROUGH SATURDAY AND 1/2 TAB ON SUNDAY AT [facility] MEDICALLY NECESSARY 5/19/18   Johnathan Webster MD  
escitalopram oxalate (LEXAPRO) 10 mg tablet TAKE 1 TABLET BY MOUTH  DAILY 4/23/18   Zaira Bella MD  
atorvastatin (LIPITOR) 10 mg tablet Take 1 tablet by mouth  daily 7/10/17   Zaira Bella MD  
calcium-cholecalciferol, D3, (CALTRATE 600+D) tablet Take 2 Tabs by mouth daily. Historical Provider POLYETHYLENE GLYCOL 3350 (MIRALAX PO) Take  by mouth. 1-2 
 tsp daily    Historical Provider  
aspirin delayed-release 81 mg tablet Take 81 mg by mouth daily. Historical Provider REVIEW OF SYSTEMS:  POSITIVE= Bold. Negative = normal text General:  fever, chills, sweats, generalized weakness, weight loss/gain, loss of appetite Eyes:  blurred vision, eye pain, loss of vision, diplopia Ear Nose and Throat:  rhinorrhea, pharyngitis Respiratory:   cough, sputum production, SOB, wheezing, CHOW, pleuritic pain 
Cardiology:  chest pain, palpitations, orthopnea, PND, edema, syncope Gastrointestinal:  abdominal pain, N/V, dysphagia, diarrhea, constipation, bleeding Genitourinary:  frequency, urgency, dysuria, hematuria, incontinence Muskuloskeletal :  arthralgia, myalgia Hematology:  easy bruising, bleeding, lymphadenopathy Dermatological:  rash, ulceration, pruritis Endocrine:  hot flashes or polydipsia Neurological:  headache, dizziness, confusion, focal weakness, paresthesia, memory loss, gait disturbance Psychological: anxiety, depression, agitation Objective: VITALS:   
Visit Vitals  /75  Pulse 75  Temp 98 °F (36.7 °C)  Resp 17  Ht 5' 5\" (1.651 m)  Wt 74.8 kg (165 lb)  SpO2 96%  BMI 27.46 kg/m2 Temp (24hrs), Av °F (36.7 °C), Min:98 °F (36.7 °C), Max:98 °F (36.7 °C) Body mass index is 27.46 kg/(m^2). PHYSICAL EXAM: 
 
General:    Alert, cooperative, no distress, appears stated age. Oriented to self, place, not fully to situation or time. Looks comfortable. Some pain when goes to sit up HEENT: Atraumatic, anicteric sclerae, pink conjunctivae No oral ulcers, mucosa moist, throat clear. Hearing intact. Neck:  Supple, symmetrical,  thyroid: non tender Lungs:   Clear to auscultation bilaterally. No Wheezing or Rhonchi. No rales. Chest wall:  No tenderness  No Accessory muscle use. Heart:   Regular  rhythm,  2/6 systolic  murmur   No gallop. No edema. Abdomen:   Soft, non-tender. Not distended. Bowel sounds normal. No masses Extremities: No cyanosis. No clubbing Skin:     Not pale Not Jaundiced  No rashes Psych:  Good insight. Not depressed. Not anxious or agitated. Neurologic: EOMs intact. No facial asymmetry. No aphasia or slurred speech. Symmetrical strength, Alert and oriented X self, month, not year or president IMAGING RESULTS: 
 []       I have personally reviewed the actual   []     CXR  []     CT scan CXR: 
Xray lumbar spine: Interval compression of L1 with approximately 2/3rds loss of vertebral body 
height (acute fracture of L1 was identified 2018). The acuity of the 
compression is indeterminate. CT lumbar spine:  
IMPRESSION:    
1. Acute on chronic compression fracture of the L1 vertebral body, with acute 
component new since 2018. There is now approximately 80% height loss 
centrally, and retropulsion of the superior endplate resulting in mild spinal 
canal stenosis. 2. Acute minimally displaced fracture of the T12 vertebral body with disruption 
of the anterior cortex. No significant height loss. 3. Partially visualized chronic compression deformity with vertebroplasty cement 
of the T11 vertebral body. Xray sacrum and coccyx: 
No visible displaced fracture EKG: 
 ________________________________________________________________________ Care Plan discussed with: 
  Comments Patient y Family  y Daughter bedside RN Care Manager Consultant:     
________________________________________________________________________ Prophylaxis: 
GI none DVT SCD  
________________________________________________________________________ Recommended Disposition:  
Home with Family HH/PT/OT/RN   
SNF/LTC ? FLIP ?  
________________________________________________________________________ Code Status: 
Full Code y  
DNR/DNI   
________________________________________________________________________ TOTAL TIME:  65 minutes 
______________________________________________________________________ Monty Rene MD 
 
 
Procedures: see electronic medical records for all procedures/Xrays and details which were not copied into this note but were reviewed prior to creation of Plan. LAB DATA REVIEWED:   
Recent Results (from the past 24 hour(s)) CBC WITH AUTOMATED DIFF Collection Time: 09/03/18 11:55 AM  
Result Value Ref Range WBC 5.8 3.6 - 11.0 K/uL  
 RBC 4.03 3.80 - 5.20 M/uL  
 HGB 12.7 11.5 - 16.0 g/dL HCT 37.9 35.0 - 47.0 % MCV 94.0 80.0 - 99.0 FL  
 MCH 31.5 26.0 - 34.0 PG  
 MCHC 33.5 30.0 - 36.5 g/dL  
 RDW 13.0 11.5 - 14.5 % PLATELET 589 147 - 851 K/uL MPV 9.5 8.9 - 12.9 FL  
 NRBC 0.0 0  WBC ABSOLUTE NRBC 0.00 0.00 - 0.01 K/uL NEUTROPHILS 76 (H) 32 - 75 % LYMPHOCYTES 14 12 - 49 % MONOCYTES 7 5 - 13 % EOSINOPHILS 2 0 - 7 % BASOPHILS 1 0 - 1 % IMMATURE GRANULOCYTES 0 0.0 - 0.5 % ABS. NEUTROPHILS 4.4 1.8 - 8.0 K/UL  
 ABS. LYMPHOCYTES 0.8 0.8 - 3.5 K/UL  
 ABS. MONOCYTES 0.4 0.0 - 1.0 K/UL  
 ABS. EOSINOPHILS 0.1 0.0 - 0.4 K/UL  
 ABS. BASOPHILS 0.1 0.0 - 0.1 K/UL  
 ABS. IMM. GRANS. 0.0 0.00 - 0.04 K/UL  
 DF SMEAR SCANNED    
 RBC COMMENTS NORMOCYTIC, NORMOCHROMIC METABOLIC PANEL, BASIC Collection Time: 09/03/18 11:55 AM  
Result Value Ref Range Sodium 139 136 - 145 mmol/L Potassium 4.0 3.5 - 5.1 mmol/L Chloride 104 97 - 108 mmol/L  
 CO2 30 21 - 32 mmol/L Anion gap 5 5 - 15 mmol/L Glucose 93 65 - 100 mg/dL BUN 21 (H) 6 - 20 MG/DL Creatinine 0.72 0.55 - 1.02 MG/DL  
 BUN/Creatinine ratio 29 (H) 12 - 20 GFR est AA >60 >60 ml/min/1.73m2 GFR est non-AA >60 >60 ml/min/1.73m2  Calcium 8.6 8.5 - 10.1 MG/DL

## 2018-09-03 NOTE — ED NOTES
Bedside and Verbal shift change report given to Mira Jamison RN (oncoming nurse) by Silvia Barnes RN (offgoing nurse). Report included the following information SBAR, ED Summary, MAR and Med Rec Status.

## 2018-09-03 NOTE — ED NOTES
Assumed care of pt via EMS stretcher. Per EMS pt with GLF 12 days ago with increasing lower back pain since. Pt placed on monitor x2. VSS. Pt in position of comfort with no signs of acute distress noted.

## 2018-09-03 NOTE — IP AVS SNAPSHOT
[address] [facility] 
[phone] Patient: Tammie Tanner MRN: [id number] :3/6/1932 A check bakari indicates which time of day the medication should be taken. My Medications START taking these medications Instructions Each Dose to Equal  
 Morning Noon Evening Bedtime  
 oxyCODONE IR 5 mg immediate release tablet Commonly known as:  Nick Batista Your last dose was: Your next dose is: Take 1 Tab by mouth every four (4) hours as needed. Max Daily Amount: 30 mg.  
 5 mg  
    
   
   
   
  
 senna-docusate 8.6-50 mg per tablet Commonly known as:  Anna Solis Your last dose was: Your next dose is: Take 1 Tab by mouth daily. 1 Tab CHANGE how you take these medications Instructions Each Dose to Equal  
 Morning Noon Evening Bedtime  
 atorvastatin 10 mg tablet Commonly known as:  LIPITOR What changed:  See the new instructions. Your last dose was: Your next dose is: Take 1 tablet by mouth  daily  
     
   
   
   
  
 escitalopram oxalate 10 mg tablet Commonly known as:  Tip Batista What changed:  See the new instructions. Your last dose was: Your next dose is: TAKE 1 TABLET BY MOUTH  DAILY CONTINUE taking these medications Instructions Each Dose to Equal  
 Morning Noon Evening Bedtime  
 alendronate 70 mg tablet Commonly known as:  FOSAMAX Your last dose was: Your next dose is: Take 70 mg by mouth every seven (7) days. 70 mg  
    
   
   
   
  
 aspirin delayed-release 81 mg tablet Your last dose was: Your next dose is: Take 81 mg by mouth daily. 81 mg  
    
   
   
   
  
 b complex vitamins tablet Your last dose was: Your next dose is: Take 1 Tab by mouth daily. 1 Tab  
    
   
   
   
  
 calcium-cholecalciferol (D3) tablet Commonly known as:  CALTRATE 600+D Your last dose was: Your next dose is: Take 2 Tabs by mouth daily. 2 Tab  
    
   
   
   
  
 CO Q-10 10 mg Cap Generic drug:  coenzyme q10 Your last dose was: Your next dose is: Take 1 Tab by mouth every evening. 1 Tab * gabapentin 300 mg capsule Commonly known as:  NEURONTIN Your last dose was: Your next dose is: Take 600 mg by mouth daily. Indications: 2 TABS TWICE DAILY 600 mg  
    
   
   
   
  
 * gabapentin 300 mg capsule Commonly known as:  NEURONTIN Your last dose was: Your next dose is: Take 300 mg by mouth every evening. 300 mg  
    
   
   
   
  
 glucosamine-chondroitin 500-400 mg Cap Commonly known as:  20 [facility] Your last dose was: Your next dose is: Take 1 Cap by mouth two (2) times a day. 1 Cap MIRALAX PO Your last dose was: Your next dose is: Take  by mouth. 1-2  tsp daily SYNTHROID 75 mcg tablet Generic drug:  levothyroxine Your last dose was: Your next dose is: TAKE 1 TABLET BY MOUTH ON  MONDAY THROUGH SATURDAY AND 1/2 TAB ON SUNDAY AT [facility] MEDICALLY NECESSARY * Notice: This list has 2 medication(s) that are the same as other medications prescribed for you. Read the directions carefully, and ask your doctor or other care provider to review them with you. STOP taking these medications   
 traMADol 50 mg tablet Commonly known as:  ULTRAM  
   
  
  
  
Where to Get Your Medications These medications were sent to [address], [address] [place], [place] [address] Phone:  [phone]  
  senna-docusate 8.6-50 mg per tablet Information on where to get these meds will be given to you by the nurse or doctor. ! Ask your nurse or doctor about these medications  
  oxyCODONE IR 5 mg immediate release tablet

## 2018-09-04 NOTE — PROGRESS NOTES
OT consult received and chart reviewed. Patient has been off floor for bone scan and remains off floor at this time. Per chart review, patient to have kyphoplasty this pm. Will defer therapy today and follow back tomorrow to initiate evaluation.

## 2018-09-04 NOTE — PROGRESS NOTES
Physical Therapy Consult received and chart reviewed. Patient has been off floor for bone scan and remains off floor at this time. Per chart review, patient to have kyphoplasty this pm. Will defer therapy today and follow back tomorrow to initiate evaluation. Thank you, Kurtis Granger, PT

## 2018-09-04 NOTE — PROGRESS NOTES
Bedside and Verbal shift change report given to [facility] Renee Moncada (oncoming nurse) by adan MURILLO (offgoing nurse). Report included the following information SBAR, Kardex, Procedure Summary, Intake/Output, MAR and Accordion.

## 2018-09-04 NOTE — H&P
Radiology History and Physical 
 
Patient: Jonathan Parra 80 y.o. female Chief Complaint: Fall (EMS reports GLF 12 days ago, pt with increased lower back pain since fall) History of Present Illness: for T8, T12, L1 kyphoplasty History: 
 
Past Medical History:  
Diagnosis Date  Acquired hypothyroidism 10/30/2017  Arthritis  Back pain 2/16/2011  Cancer [facility]) 1993  
 colon  Chronic pain   
 in her back  Dementia 2011 Mild; Neuropsych testing with Dr. Samaria Young in 2011  Dementia 7/21/2011  Epigastric pain 2/16/2011  Essential hypertension  Essential hypertension 12/4/2015  Hyperlipidemia 2/16/2011  Hypertension 2/16/2011  Hypothyroidism 2/16/2011  
 s/p VALDES in 1998 and 1999 for hyperthyroidism  Neuropathy 2/16/2011  Orthostasis 7/6/2012  Orthostasis 7/6/2012  Osteoporosis 2/16/2011  Pacemaker  Persistent disorder of initiating or maintaining sleep 4/22/2015  S/P cardiac pacemaker procedure 8/24/2011  
 SSS (sick sinus syndrome) ([facility] [address] 8/20/2011  Syncope  Syncope and collapse 8/19/2011  Urinary frequency 2/16/2011 Family History Problem Relation Age of Onset  Diabetes Mother  Thyroid Disease Mother  Heart Disease Mother  Hypertension Father  Heart Disease Father  Diabetes Sister  Diabetes Brother Social History Social History  Marital status:  Spouse name: N/A  
 Number of children: N/A  
 Years of education: N/A Occupational History  Not on file. Social History Main Topics  Smoking status: Never Smoker  Smokeless tobacco: Never Used  Alcohol use No  
 Drug use: No  
 Sexual activity: Not on file Other Topics Concern  Not on file Social History Narrative Lives in [place] with  of 54 years. Has a son and daughter. Used to work as a  for [employer] and at [employer] firm. Likes to garden and craft. Allergies: Allergies Allergen Reactions  Amoxicillin Unknown (comments)  Aricept [Donepezil] Other (comments) Bad dreams.  Augmentin [Amoxicillin-Pot Clavulanate] Unknown (comments)  Pcn [Penicillins] Rash  Zithromax [Azithromycin] Unknown (comments) Current Medications: 
Current Facility-Administered Medications Medication Dose Route Frequency  [START ON 9/5/2018] levothyroxine (SYNTHROID) tablet 75 mcg  75 mcg Oral Once per day on Mon Tue Wed Thu Fri Sat  [START ON 9/9/2018] levothyroxine (SYNTHROID) tablet 37.5 mcg  37.5 mcg Oral every Sunday  
 0.9% sodium chloride infusion  25 mL/hr IntraVENous CONTINUOUS  
 fentaNYL citrate (PF) injection 100 mcg  100 mcg IntraVENous Multiple  midazolam (VERSED) injection 5 mg  5 mg IntraVENous Multiple  ceFAZolin (ANCEF) 2 g/20 mL in sterile water IV syringe  2 g IntraVENous ONCE  
 ketorolac (TORADOL) injection 15 mg  15 mg IntraVENous PRN  
 HYDROmorphone (PF) (DILAUDID) injection 1 mg  1 mg IntraVENous ONCE  
 bupivacaine (PF) (MARCAINE) 0.5 % (5 mg/mL) injection 10 mg  2 mL Epidural ONCE  
 lidocaine (XYLOCAINE) 20 mg/mL (2 %) injection 400 mg  20 mL SubCUTAneous RAD ONCE  
 iopamidol (ISOVUE-M 200) 41 % contrast solution 2 mL  2 mL IntraSPINal RAD ONCE  
 HYDROmorphone (DILAUDID) 2 mg/mL injection  sodium chloride (NS) flush 5-10 mL  5-10 mL IntraVENous Q8H  
 sodium chloride (NS) flush 5-10 mL  5-10 mL IntraVENous PRN  
 acetaminophen (TYLENOL) tablet 650 mg  650 mg Oral Q6H  
 oxyCODONE IR (ROXICODONE) tablet 5 mg  5 mg Oral Q4H PRN  
 morphine injection 1 mg  1 mg IntraVENous Q4H PRN  
 naloxone (NARCAN) injection 0.4 mg  0.4 mg IntraVENous PRN  
 ondansetron (ZOFRAN) injection 4 mg  4 mg IntraVENous Q6H PRN  
 docusate sodium (COLACE) capsule 100 mg  100 mg Oral BID  
 gabapentin (NEURONTIN) capsule 300 mg  300 mg Oral QPM  
 gabapentin (NEURONTIN) capsule 600 mg  600 mg Oral DAILY  escitalopram oxalate (LEXAPRO) tablet 10 mg  10 mg Oral QPM  
 atorvastatin (LIPITOR) tablet 10 mg  10 mg Oral QHS  calcium-vitamin D (OS-KAUSHIK) 500 mg-200 unit tablet   Oral DAILY  polyethylene glycol (MIRALAX) packet 17 g  17 g Oral DAILY  aspirin delayed-release tablet 81 mg  81 mg Oral DAILY Physical Exam: 
Blood pressure 148/84, pulse 75, temperature 98.1 °F (36.7 °C), resp. rate 16, height 5' 5\" (1.651 m), weight 74.8 kg (165 lb), SpO2 99 %. LUNG: clear to auscultation bilaterally, HEART: regular rate and rhythm Alerts:   
Hospital Problems  Date Reviewed: 9/4/2018 Codes Class Noted POA Low back pain ICD-10-CM: M54.5 ICD-9-CM: 724.2  9/3/2018 Yes Difficulty walking ICD-10-CM: R26.2 ICD-9-CM: 719.7  9/3/2018 Yes * (Principal)Compression fracture of L1 lumbar vertebra ([facility] [address] ICD-10-CM: S32.010A ICD-9-CM: 805.4  9/3/2018 Yes Laboratory:     
Recent Labs  
   09/04/18 
 8905 HGB  13.5 HCT  41.3 WBC  6.0  
PLT  276 BUN  14  
CREA  0.73  
K  4.3 Plan of Care/Planned Procedure: 
Risks, benefits, and alternatives reviewed with patient and she agrees to proceed with the procedure.   
 
 
Brielle Cook MD

## 2018-09-04 NOTE — PROGRESS NOTES
Problem: Pressure Injury - Risk of 
Goal: *Prevention of pressure injury Document Jaxon Scale and appropriate interventions in the flowsheet. Outcome: Progressing Towards Goal 
Pressure Injury Interventions: 
Sensory Interventions: Assess changes in LOC, Float heels, Keep linens dry and wrinkle-free Activity Interventions: Increase time out of bed, Pressure redistribution bed/mattress(bed type), PT/OT evaluation Mobility Interventions: Float heels, HOB 30 degrees or less, Pressure redistribution bed/mattress (bed type), PT/OT evaluation Nutrition Interventions: Document food/fluid/supplement intake

## 2018-09-04 NOTE — PROGRESS NOTES
Problem: Falls - Risk of 
Goal: *Absence of Falls Document Marcella Rodarte Fall Risk and appropriate interventions in the flowsheet. Outcome: Progressing Towards Goal 
Fall Risk Interventions: 
Mobility Interventions: Patient to call before getting OOB, Utilize gait belt for transfers/ambulation Mentation Interventions: Gait belt with transfers/ambulation, Reorient patient Medication Interventions: Patient to call before getting OOB, Teach patient to arise slowly, Utilize gait belt for transfers/ambulation Elimination Interventions: Patient to call for help with toileting needs, Toilet paper/wipes in reach, Toileting schedule/hourly rounds History of Falls Interventions: Utilize gait belt for transfer/ambulation Problem: Pressure Injury - Risk of 
Goal: *Prevention of pressure injury Document Jaxon Scale and appropriate interventions in the flowsheet. Outcome: Progressing Towards Goal 
Pressure Injury Interventions: 
Sensory Interventions: Assess changes in LOC, Float heels, Keep linens dry and wrinkle-free Activity Interventions: Increase time out of bed, Pressure redistribution bed/mattress(bed type), PT/OT evaluation Mobility Interventions: Float heels, HOB 30 degrees or less, Pressure redistribution bed/mattress (bed type), PT/OT evaluation Nutrition Interventions: Document food/fluid/supplement intake

## 2018-09-04 NOTE — PROGRESS NOTES
INTERNAL MEDICINE ATTENDING NOTE 
 
S: Ms. Jarrod Bernstein was seen by me today during rounds. At this time, she is resting comfortably. Other than pain, the patient has no new complaints today. ROS otherwise unremarkable except as noted elsewhere. O: Blood pressure 152/72, pulse 75, temperature 98.2 °F (36.8 °C), resp. rate 16, height 5' 5\" (1.651 m), weight 165 lb (74.8 kg), SpO2 95 %. Gen: Patient is in no acute distress. Lungs: CTAB. Heart: RRR. Abd: S, NT, ND, BS present. Extremities: Warm. Recent Results (from the past 12 hour(s)) CBC WITH AUTOMATED DIFF Collection Time: 09/04/18  3:53 AM  
Result Value Ref Range WBC 6.0 3.6 - 11.0 K/uL  
 RBC 4.35 3.80 - 5.20 M/uL  
 HGB 13.5 11.5 - 16.0 g/dL HCT 41.3 35.0 - 47.0 % MCV 94.9 80.0 - 99.0 FL  
 MCH 31.0 26.0 - 34.0 PG  
 MCHC 32.7 30.0 - 36.5 g/dL  
 RDW 13.0 11.5 - 14.5 % PLATELET 783 939 - 139 K/uL MPV 9.6 8.9 - 12.9 FL  
 NRBC 0.0 0  WBC ABSOLUTE NRBC 0.00 0.00 - 0.01 K/uL NEUTROPHILS 62 32 - 75 % LYMPHOCYTES 25 12 - 49 % MONOCYTES 7 5 - 13 % EOSINOPHILS 6 0 - 7 % BASOPHILS 1 0 - 1 % IMMATURE GRANULOCYTES 0 0.0 - 0.5 % ABS. NEUTROPHILS 3.7 1.8 - 8.0 K/UL  
 ABS. LYMPHOCYTES 1.5 0.8 - 3.5 K/UL  
 ABS. MONOCYTES 0.4 0.0 - 1.0 K/UL  
 ABS. EOSINOPHILS 0.3 0.0 - 0.4 K/UL  
 ABS. BASOPHILS 0.0 0.0 - 0.1 K/UL  
 ABS. IMM. GRANS. 0.0 0.00 - 0.04 K/UL  
 DF AUTOMATED METABOLIC PANEL, BASIC Collection Time: 09/04/18  3:53 AM  
Result Value Ref Range Sodium 137 136 - 145 mmol/L Potassium 4.3 3.5 - 5.1 mmol/L Chloride 102 97 - 108 mmol/L  
 CO2 29 21 - 32 mmol/L Anion gap 6 5 - 15 mmol/L Glucose 90 65 - 100 mg/dL BUN 14 6 - 20 MG/DL Creatinine 0.73 0.55 - 1.02 MG/DL  
 BUN/Creatinine ratio 19 12 - 20 GFR est AA >60 >60 ml/min/1.73m2 GFR est non-AA >60 >60 ml/min/1.73m2  Calcium 8.6 8.5 - 10.1 MG/DL  
  
 
A:  
 Compression fracture of L1 lumbar vertebra ([facility] [address] (9/3/2018) Active Problems: Low back pain (9/3/2018) Difficulty walking (9/3/2018) P:  Pain meds. PT/ OT. Kyphoplasty via IR planned Diamond Means MD, [address] Best contact is via Pager: [phone], or via hospital  at [phone]

## 2018-09-05 NOTE — PROGRESS NOTES
Problem: Mobility Impaired (Adult and Pediatric) Goal: *Acute Goals and Plan of Care (Insert Text) Physical Therapy Goals Initiated 9/5/2018 1. Patient will move from supine to sit and sit to supine  in bed with modified independence within 4 days. 2. Patient will perform sit to stand with modified independence within 4 days. 3. Patient will ambulate with modified independence for 150 feet with the least restrictive device within 4 days. 4. Patient will ascend/descend 3 stairs with 1 handrail(s) with modified independence within 4 days. 5. Patient will verbalize and demonstrate understanding of spinal precautions (No bending, lifting greater than 5 lbs, or twisting; log-roll technique; frequent repositioning as instructed) within 4 days. physical Therapy EVALUATION Patient: Castillo Hart (50 y.o. female) Date: 9/5/2018 Primary Diagnosis: Low back pain Compression fracture of L1 lumbar vertebra (HCC) Difficulty walking Precautions: back ASSESSMENT : 
Based on the objective data described below, the patient presents with back pain, impaired ROM, and decreased independence with functional mobility following admission for a GLF and subsequent kyphoplasty. Prior to admission this patient was modified independent with a rolling walker. She lives with her  who suffers from dementia and has caregivers from 8/8:30-6pm daily. She c/o severe pain during evaluation but her functional performance was much greater than anticipated for her subjective pain rating. She required CGA overall for bed mobility and was able to gait train [de-identified]' with RW. No LOB and good overall gait quality. Of note, she presented with poorly controlled descent to the chair x2 and education was provided to slow her descent both for safety and back health. Anticipate good progress towards goals and recommend home with additional home supervision and HHPT services pending continued progress with mobility. Patient will benefit from skilled intervention to address the above impairments. Patients rehabilitation potential is considered to be Good Factors which may influence rehabilitation potential include:  
[]         None noted 
[]         Mental ability/status []         Medical condition 
[]         Home/family situation and support systems 
[]         Safety awareness [x]         Pain tolerance/management 
[]         Other: PLAN : 
Recommendations and Planned Interventions: 
[x]           Bed Mobility Training             [x]    Neuromuscular Re-Education 
[x]           Transfer Training                   []    Orthotic/Prosthetic Training 
[x]           Gait Training                         []    Modalities [x]           Therapeutic Exercises           []    Edema Management/Control 
[x]           Therapeutic Activities            []    Patient and Family Training/Education 
[]           Other (comment): Frequency/Duration: Patient will be followed by physical therapy  daily to address goals. Discharge Recommendations: Home Health Further Equipment Recommendations for Discharge: None SUBJECTIVE:  
Patient stated It feels the same as it did yesterday.  OBJECTIVE DATA SUMMARY:  
HISTORY:   
Past Medical History:  
Diagnosis Date  Acquired hypothyroidism 10/30/2017  Arthritis  Back pain 2/16/2011  Cancer [facility]) 1993  
 colon  Chronic pain   
 in her back  Dementia 2011 Mild; Neuropsych testing with Dr. Jaquita Boeck in 2011  Dementia 7/21/2011  Epigastric pain 2/16/2011  Essential hypertension  Essential hypertension 12/4/2015  Hyperlipidemia 2/16/2011  Hypertension 2/16/2011  Hypothyroidism 2/16/2011  
 s/p VALDES in 1998 and 1999 for hyperthyroidism  Neuropathy 2/16/2011  Orthostasis 7/6/2012  Orthostasis 7/6/2012  Osteoporosis 2/16/2011  Pacemaker  Persistent disorder of initiating or maintaining sleep 4/22/2015  S/P cardiac pacemaker procedure 8/24/2011  
 SSS (sick sinus syndrome) ([facility] [address] 8/20/2011  Syncope  Syncope and collapse 8/19/2011  Urinary frequency 2/16/2011 Past Surgical History:  
Procedure Laterality Date  HX GI  1993  
 colon resection  HX HEENT    
 tonsillectomy in 1950  HX PACEMAKER    
 OH ENTEROSCOPY > 2ND PRTN ILEUM CONTROL BLEEDING  6/23/2011 Prior Level of Function/Home Situation: modified independent with RW 
Personal factors and/or comorbidities impacting plan of care:  
 
Home Situation Home Environment: Private residence # Steps to Enter: 3 Rails to Enter: Yes Hand Rails : Bilateral 
One/Two Story Residence: One story Living Alone: No 
Support Systems: Family member(s) (8-6 daily caregiver) Patient Expects to be Discharged to[de-identified] Private residence Current DME Used/Available at Home: Connee Simmering, rollator, Walker, rolling, Shower chair, Raised toilet seat, Cane, straight, Commode, bedside Tub or Shower Type: Tub/Shower combination EXAMINATION/PRESENTATION/DECISION MAKING:  
Critical Behavior: 
Neurologic State: Alert Orientation Level: Oriented X4 Cognition: Follows commands Safety/Judgement: Decreased awareness of need for safety, Insight into deficits Hearing: Auditory Auditory Impairment: Hard of hearing, bilateral, Hearing aid(s) Hearing Aids/Status: Bilateral 
Skin:   
Edema:  
Range Of Motion: 
AROM: Within functional limits Strength:   
Strength: Generally decreased, functional 
  
  
  
  
  
  
Tone & Sensation:  
Tone: Normal 
  
  
  
  
Sensation: Intact Coordination: 
Coordination: Within functional limits Functional Mobility: 
Bed Mobility: 
Rolling: Contact guard assistance Supine to Sit: Contact guard assistance Scooting: Supervision Transfers: 
Sit to Stand: Contact guard assistance Stand to Sit: Contact guard assistance Bed to Chair: Contact guard assistance (ambulating with a RW) Balance:  
Sitting: Intact Standing: Impaired (with support of RW) Standing - Static: Good Standing - Dynamic : Good Ambulation/Gait Training: 
Distance (ft): 80 Feet (ft) Assistive Device: Gait belt;Walker, rolling Ambulation - Level of Assistance: Contact guard assistance Gait Description (WDL): Exceptions to [facility] Gait Abnormalities: Decreased step clearance Base of Support: Widened Speed/Meliza: Slow;Shuffled Therapeutic Exercises:  
 
 
Functional Measure: 
Tinetti test: 
 
Sitting Balance: 1 Arises: 1 Attempts to Rise: 2 Immediate Standing Balance: 1 Standing Balance: 1 Nudged: 1 Eyes Closed: 1 Turn 360 Degrees - Continuous/Discontinuous: 0 Turn 360 Degrees - Steady/Unsteady: 1 Sitting Down: 1 Balance Score: 10 Indication of Gait: 1 
R Step Length/Height: 1 L Step Length/Height: 1 
R Foot Clearance: 1 L Foot Clearance: 1 Step Symmetry: 1 Step Continuity: 1 Path: 1 Trunk: 1 Walking Time: 1 Gait Score: 10 Total Score: 20 Tinetti Test and G-code impairment scale: 
Percentage of Impairment CH 
 
0% 
 CI 
 
1-19% CJ 
 
20-39% CK 
 
40-59% CL 
 
60-79% CM 
 
80-99% CN  
 
100% Tinetti Score 0-28 28 23-27 17-22 12-16 6-11 1-5 0 Tinetti Tool Score Risk of Falls 
<19 = High Fall Risk 19-24 = Moderate Fall Risk 25-28 = Low Fall Risk Tinetti ME. Performance-Oriented Assessment of Mobility Problems in Elderly Patients. Roberts 66; [id number]. (Scoring Description: PT Bulletin Feb. 10, 1993) Older adults: Eleazar Roy et al, 2009; n = [address] [facility] elderly evaluated with ABC, MESFIN, ADL, and IADL) · Mean MESFIN score for males aged 69-68 years = 26.21(3.40) · Mean MESFIN score for females age 69-68 years = 25.16(4.30) · Mean MESFIN score for males over 80 years = 23.29(6.02) · Mean MESFIN score for females over 80 years = 17.20(8.32) G codes: In compliance with CMSs Claims Based Outcome Reporting, the following G-code set was chosen for this patient based on their primary functional limitation being treated: The outcome measure chosen to determine the severity of the functional limitation was the Tinetti with a score of 20/24 which was correlated with the impairment scale. ? Mobility - Walking and Moving Around:  
  - CURRENT STATUS: CJ - 20%-39% impaired, limited or restricted  - GOAL STATUS: CI - 1%-19% impaired, limited or restricted  - D/C STATUS:  ---------------To be determined--------------- Physical Therapy Evaluation Charge Determination History Examination Presentation Decision-Making MEDIUM  Complexity : 1-2 comorbidities / personal factors will impact the outcome/ POC  MEDIUM Complexity : 3 Standardized tests and measures addressing body structure, function, activity limitation and / or participation in recreation  LOW Complexity : Stable, uncomplicated  LOW Complexity : FOTO score of  Based on the above components, the patient evaluation is determined to be of the following complexity level: LOW Pain: 
Pain Scale 1: Numeric (0 - 10) Pain Intensity 1: 7 Pain Location 1: Back Pain Orientation 1: Lower Pain Description 1: Aching Pain Intervention(s) 1: Medication (see MAR) Activity Tolerance:  
 
Please refer to the flowsheet for vital signs taken during this treatment. After treatment:  
[x]         Patient left in no apparent distress sitting up in chair 
[]         Patient left in no apparent distress in bed 
[x]         Call bell left within reach [x]         Nursing notified 
[]         Caregiver present 
[]         Bed alarm activated COMMUNICATION/EDUCATION:  
The patients plan of care was discussed with: Registered Nurse. [x]         Fall prevention education was provided and the patient/caregiver indicated understanding. [x]         Patient/family have participated as able in goal setting and plan of care. [x]         Patient/family agree to work toward stated goals and plan of care. []         Patient understands intent and goals of therapy, but is neutral about his/her participation. []         Patient is unable to participate in goal setting and plan of care. Thank you for this referral. 
Leticia Mai, PT, DPT Time Calculation: 27 mins

## 2018-09-05 NOTE — PROGRESS NOTES
INTERNAL MEDICINE ATTENDING NOTE 
 
S: Ms. Amparo Harp was seen by me today during rounds. At this time, she is resting comfortably. Pain still severe; \"I don't feel any better. \"   ROS otherwise unremarkable except as noted elsewhere. O: Blood pressure 114/68, pulse 76, temperature 98.6 °F (37 °C), resp. rate 16, height 5' 5\" (1.651 m), weight 165 lb (74.8 kg), SpO2 97 %. Gen: Patient is in no acute distress. Lungs: CTAB. Heart: RRR. Abd: S, NT, ND, BS present. Extremities: Warm. No results found for this or any previous visit (from the past 12 hour(s)). A:  
  Compression fracture of L1 lumbar vertebra ([address] (9/3/2018) Active Problems: Low back pain (9/3/2018) Difficulty walking (9/3/2018) P:  Pain meds. PT/ OT. S/p Kyphoplasty via IR Myriam Galvez MD, Ernie Menon Best contact is via Pager: [phone], or via hospital  at [phone]

## 2018-09-05 NOTE — PROGRESS NOTES
Spiritual Care Partner Volunteer visited patient in Neuro on September 5, 2018. Documented by: 
COREY Wang, [facility],  [facility]  Paging Service  [phone])

## 2018-09-05 NOTE — PROGRESS NOTES
Problem: Mobility Impaired (Adult and Pediatric) Goal: *Acute Goals and Plan of Care (Insert Text) Physical Therapy Goals Initiated 9/5/2018 1. Patient will move from supine to sit and sit to supine  in bed with modified independence within 4 days. 2. Patient will perform sit to stand with modified independence within 4 days. 3. Patient will ambulate with modified independence for 150 feet with the least restrictive device within 4 days. 4. Patient will ascend/descend 3 stairs with 1 handrail(s) with modified independence within 4 days. 5. Patient will verbalize and demonstrate understanding of spinal precautions (No bending, lifting greater than 5 lbs, or twisting; log-roll technique; frequent repositioning as instructed) within 4 days. physical Therapy TREATMENT Patient: Alexandra Rodriguez (48 y.o. female) Date: 9/5/2018 Precautions:    
Chart, physical therapy assessment, plan of care and goals were reviewed. ASSESSMENT: 
Patient making good progress towards goals and able to gait train 200' this pm using a RW and CGA. Cued to keep the walker within her VON and to slow aimee but no LOB and improved activity tolerance compared to this am. Pain was controled and no worse with gait. Recommend discharge home with HHPT and additional in home support when medically stable. Progression toward goals: 
[x]      Improving appropriately and progressing toward goals 
[]      Improving slowly and progressing toward goals 
[]      Not making progress toward goals and plan of care will be adjusted PLAN: 
Patient continues to benefit from skilled intervention to address the above impairments. Continue treatment per established plan of care. Discharge Recommendations:  Home Health Further Equipment Recommendations for Discharge:  to be determined SUBJECTIVE:  
Patient stated I feel better than I did this morning.  The patient stated 2/3 back precautions. Reviewed all 3 with patient. OBJECTIVE DATA SUMMARY:  
Functional Mobility Training: 
Bed Mobility: 
Log Rolling: Contact guard assistance Supine to Sit: Contact guard assistance Scooting: Supervision Transfers: 
Sit to Stand: Contact guard assistance Stand to Sit: Contact guard assistance Bed to Chair: Contact guard assistance (ambulating with a RW) Ambulation/Gait Training: 
Distance (ft): 200 Feet (ft) Assistive Device: Gait belt;Walker, rolling Ambulation - Level of Assistance: Contact guard assistance Gait Description (WDL): Exceptions to [facility] Gait Abnormalities: Decreased step clearance Base of Support: Widened Speed/Meliza: Slow;Shuffled Stairs: Therapeutic Exercises:  
 
Pain: 
Pain Scale 1: Numeric (0 - 10) Pain Intensity 1: 7 Pain Location 1: Back Pain Orientation 1: Lower Pain Description 1: Aching Pain Intervention(s) 1: Medication (see MAR) Activity Tolerance:  
 
Please refer to the flowsheet for vital signs taken during this treatment. After treatment:  
[]  Patient left in no apparent distress sitting up in chair 
[x]  Patient left in no apparent distress in bed 
[x]  Call bell left within reach [x]  Nursing notified 
[]  Caregiver present 
[]  Bed alarm activated COMMUNICATION/COLLABORATION:  
The patients plan of care was discussed with: Registered Nurse Polo Stephen, PT, DPT Time Calculation: 26 mins

## 2018-09-05 NOTE — ROUTINE PROCESS
9/5/2018 
07:30 AM 
 
Bedside and Verbal shift change report given to Pankaj HOU RN (oncoming nurse) by Annalise Sullivan RN (offgoing nurse). Report included the following information SBAR, Kardex, ED Summary, Procedure Summary, Intake/Output, MAR, Accordion, Recent Results, Med Rec Status and Procedure Verification.

## 2018-09-05 NOTE — PROGRESS NOTES
Occupational Therapy Goals Initiated 9/5/2018 1. Patient will perform grooming standing at sink with supervision/set-up within 7 day(s). 2.  Patient will perform sponge bathing with supervision/set-up within 7 day(s). 3.  Patient will perform lower body dressing with supervision/set-up within 7 day(s). 4.  Patient will perform toilet transfers with supervision/set-up within 7 day(s). 5.  Patient will perform all aspects of toileting with supervision/set-up within 7 day(s). Occupational Therapy EVALUATION Patient: Castillo Hart (19 y.o. female) Date: 9/5/2018 Primary Diagnosis: Low back pain Compression fracture of L1 lumbar vertebra (HCC) Difficulty walking Precautions: falls, Spine for comfort/pain management ASSESSMENT : 
Based on the objective data described below, the patient presents s/p T8, T12, and L1 kyphoplasty, now with lumbar and thoracic spine pain, mild GW, decreased activity tolerance, decreased safety awareness and decreased balance which is impairing her functional independence. She is functioning below her independent to mod I baseline, now performing ADLs at an independent to min A level, and is CGA for functional mobility. Patient will benefit from skilled intervention to address the above impairments and will need HHOT and PT after discharge. Patients rehabilitation potential is considered to be Good Factors which may influence rehabilitation potential include:  
[]             None noted []             Mental ability/status []             Medical condition []             Home/family situation and support systems [x]             Safety awareness []             Pain tolerance/management 
[]             Other: PLAN : 
Recommendations and Planned Interventions: 
[x]               Self Care Training                  []        Therapeutic Activities [x]               Functional Mobility Training    []        Cognitive Retraining [x]               Therapeutic Exercises           [x]        Endurance Activities [x]               Balance Training                   []        Neuromuscular Re-Education []               Visual/Perceptual Training     [x]   Home Safety Training 
[x]               Patient Education                 [x]        Family Training/Education []               Other (comment): Frequency/Duration: Patient will be followed by occupational therapy 5 times a week to address goals. Discharge Recommendations: Home Health OT and PT with 24 hour supervision Further Equipment Recommendations for Discharge: none OBJECTIVE DATA SUMMARY:  
 
Past Medical History:  
Diagnosis Date  Acquired hypothyroidism 10/30/2017  Arthritis  Back pain 2/16/2011  Cancer [facility]) 1993  
 colon  Chronic pain   
 in her back  Dementia 2011 Mild; Neuropsych testing with Dr. Sultana Carnes in 2011  Dementia 7/21/2011  Epigastric pain 2/16/2011  Essential hypertension  Essential hypertension 12/4/2015  Hyperlipidemia 2/16/2011  Hypertension 2/16/2011  Hypothyroidism 2/16/2011  
 s/p VALDES in 1998 and 1999 for hyperthyroidism  Neuropathy 2/16/2011  Orthostasis 7/6/2012  Orthostasis 7/6/2012  Osteoporosis 2/16/2011  Pacemaker  Persistent disorder of initiating or maintaining sleep 4/22/2015  S/P cardiac pacemaker procedure 8/24/2011  
 SSS (sick [address] syndrome) [address] [address] 8/20/2011  Syncope  Syncope and collapse 8/19/2011  Urinary frequency 2/16/2011 Past Surgical History:  
Procedure Laterality Date  HX GI  1993  
 colon resection  HX HEENT    
 tonsillectomy in 1950  HX PACEMAKER    
 NH ENTEROSCOPY > 2ND PRTN ILEUM CONTROL BLEEDING  6/23/2011 Prior Level of Function/Home Situation: ambulates with a RW, at a mod I level and is independent to mod I for ADLs.  Patient's  has caregivers 7 days per week, 08:00-18:30, to assist with his ADLs and to perform IADLs such as meal prep. Expanded or extensive additional review of patient history:  
 
Home Situation Home Environment: Private residence # Steps to Enter: 3 Rails to Enter: Yes Hand Rails : Bilateral 
One/Two Story Residence: One story Living Alone: No 
Support Systems: Family member(s) (8-6 daily caregiver) Patient Expects to be Discharged to[de-identified] Private residence Current DME Used/Available at Home: Vonna Papa, rollator, Walker, rolling, Shower chair, Raised toilet seat, Cane, straight, Commode, bedside Tub or Shower Type: Tub/Shower combination 
[x]  Right hand dominant   []  Left hand dominant Cognitive/Behavioral Status: 
Neurologic State: Alert Orientation Level: Oriented X4 Cognition: Follows commands Safety/Judgement: Decreased awareness of need for safety; Insight into deficits Vision/Perceptual:   
Acuity: Within Defined Limits Corrective Lenses: Glasses Range of Motion: 
AROM: Within functional limits Strength: 
Strength: Generally decreased, functional 
Coordination: 
Coordination: Within functional limits Fine Motor Skills-Upper: Left Intact; Right Intact Gross Motor Skills-Upper: Left Intact; Right Intact Tone & Sensation: 
Tone: Normal 
Balance: 
Sitting: Intact Standing: Impaired (with support of RW) Standing - Static: Good Standing - Dynamic : Good Functional Mobility and Transfers for ADLs: 
Bed Mobility: 
Rolling: Contact guard assistance Supine to Sit: Contact guard assistance Scooting: Supervision Transfers: 
Sit to Stand: Contact guard assistance Bed to Chair: Contact guard assistance (ambulating with a RW) Toilet Transfer : Contact guard assistance ADL Assessment: 
Feeding: Independent Oral Facial Hygiene/Grooming: Contact guard assistance Bathing: Minimum assistance Upper Body Dressing: Supervision;Setup Lower Body Dressing: Minimum assistance Toileting: Minimum assistance Functional Measure: 
Barthel Index: 
 
Bathin Bladder: 10 Bowels: 10 
Groomin Dressin Feeding: 10 Mobility: 0 Stairs: 0 Toilet Use: 5 Transfer (Bed to Chair and Back): 10 Total: 50 Barthel and G-code impairment scale: 
Percentage of impairment CH 
0% CI 
1-19% CJ 
20-39% CK 
40-59% CL 
60-79% CM 
80-99% CN 
100% Barthel Score 0-100 100 99-80 79-60 59-40 20-39 1-19 
 0 Barthel Score 0-20 20 17-19 13-16 9-12 5-8 1-4 0 The Barthel ADL Index: Guidelines 1. The index should be used as a record of what a patient does, not as a record of what a patient could do. 2. The main aim is to establish degree of independence from any help, physical or verbal, however minor and for whatever reason. 3. The need for supervision renders the patient not independent. 4. A patient's performance should be established using the best available evidence. Asking the patient, friends/relatives and nurses are the usual sources, but direct observation and common sense are also important. However direct testing is not needed. 5. Usually the patient's performance over the preceding 24-48 hours is important, but occasionally longer periods will be relevant. 6. Middle categories imply that the patient supplies over 50 per cent of the effort. 7. Use of aids to be independent is allowed. Darryle Chant., Barthel, D.W. ([id number]). Functional evaluation: the Barthel Index. [facility] (14)2. Carolina King, J.J.M.Ezekiel PENNINGTON., Alessia Sosa., [place], [address] [place] [address] (). Measuring the change indisability after inpatient rehabilitation; comparison of the responsiveness of the Barthel Index and Functional [place] Measure. Journal of Neurology, Neurosurgery, and Psychiatry, 66(4), 110-990. Aidan Syed, N.J.A, ARASELI Plasencia, & Marni Fuentes MDezA. (2004.) Assessment of post-stroke quality of life in cost-effectiveness studies: The usefulness of the Barthel Index and the EuroQoL-5D.  Quality of Life Research, 13, 726-25 In compliance with CMSs Claims Based Outcome Reporting, the following G-code set was chosen for this patient based on their primary functional limitation being treated: The outcome measure chosen to determine the severity of the functional limitation was the Barthel Index with a score of 50/100 which was correlated with the impairment scale. ? Self Care:  
  - CURRENT STATUS: CK - 40%-59% impaired, limited or restricted  - GOAL STATUS:  CJ - 20%-39% impaired, limited or restricted  - D/C STATUS:  ---------------To be determined---------------  
 
 
 
ADL Intervention and task modifications: 
Grooming Washing Hands: Contact guard assistance (standing at sink) Cues: Verbal cues provided Upper Body Dressing Assistance Shirt simulation with hospital gown: Supervision/set-up (seated) Cues: Verbal cues provided Lower Body Dressing Assistance Socks: Supervision/set-up; Compensatory technique training Leg Crossed Method Used: Yes Position Performed: Seated edge of bed Cues: Verbal cues provided;Visual cues provided Toileting Clothing Management: Contact guard assistance Pain: 
Pain Scale 1: Numeric (0 - 10) Pain Intensity 1: 7 Pain Location 1: Back Pain Orientation 1: Lower Pain Description 1: Aching Pain Intervention(s) 1: Medication (see MAR) Activity Tolerance: VSS After treatment:  
[x] Patient left in no apparent distress sitting up in chair 
[] Patient left in no apparent distress in bed 
[x] Call bell left within reach [x] Nursing notified 
[] Caregiver present [x] Bed alarm activated COMMUNICATION/EDUCATION:  
The patients plan of care was discussed with: Physical Therapist and Registered Nurse. [x] Home safety education was provided and the patient/caregiver indicated understanding. [x] Patient/family have participated as able in goal setting and plan of care. [x] Patient/family agree to work toward stated goals and plan of care. [] Patient understands intent and goals of therapy, but is neutral about his/her participation. [] Patient is unable to participate in goal setting and plan of care. This patients plan of care is appropriate for delegation to [facility]. Thank you for this referral. 
Toya Cottrell, OTR/L Time Calculation: 23 mins

## 2018-09-06 NOTE — DISCHARGE SUMMARY
Physician Discharge Summary Patient ID: Jonathan Parra [id number] 
9 y.o. 
3/6/1932 Admit date: 9/3/2018 Discharge date and time: 9/6/2018 Admission Diagnoses: Low back pain;Compression fracture of L1 lumbar vertebra (H* Discharge Diagnoses:  Principal Diagnosis Compression fracture of L1 lumbar vertebra ([address] Principal Problem: 
  Compression fracture of L1 lumbar vertebra ([address] (9/3/2018) Active Problems: Low back pain (9/3/2018) Difficulty walking (9/3/2018) Consults: Interventional Radiology Procedure: Kyphoplasty Significant Diagnostic Studies: XR spine Lumbar: 1. Interval compression of L1 with approximately 2/3rds loss of vertebral body 
height (acute fracture of L1 was identified 5/19/2018). The acuity of the 
compression is indeterminate. CT spine lumbar: 1. Acute on chronic compression fracture of the L1 vertebral body, with acute 
component new since 5/19/2018. There is now approximately 80% height loss 
centrally, and retropulsion of the superior endplate resulting in mild spinal 
canal stenosis. 2. Acute minimally displaced fracture of the T12 vertebral body with disruption 
of the anterior cortex. No significant height loss. 3. Partially visualized chronic compression deformity with vertebroplasty cement 
of the T11 vertebral body CT spine thoracic: Acute or subacute compression deformities are identified at T8, T12, 
and L1 corresponding to the abnormal uptake on the bone scan. Prior kyphoplasty 
is noted at T11. Bone scan: New abnormal tracer activity at T8, T12, and L1, corresponding to 
compression fractures on the coming CT. Diminished tracer activity at T11, at 
the site of previously treated compression fracture. Focal increased tracer 
activity in the proximal right fibula corresponds to known healing fracture. Nonspecific low-grade tracer activity in the sacrum. Increased tracer activity in the sternoclavicular joints and knees is compatible with degenerative Changes. Hospital Course: Ms. Amparo Harp is a patient of mine who presented with the problems above. See the initial H and P for full details. CHIEF COMPLAINT:  Low back pain, difficulty walking 
  
HISTORY OF PRESENT ILLNESS:    
Amparo Harp is a 80 y.o.  female with PMH mild dementia, anxiety, osteoporosis, hx T11 compression fx s/p kyphoplasty, who presents with complaint noted above. History from daughter Oliva Vines at bedside.  
  
Patient had L1 acute compression fracture due to syncope and fall in May. Went to [email or web address] for rehab, no kyphoplasty. She tripped and fell 12 days ago. 3-4 days after fall developed low back pain. Pain worse when moving trunk, no leg pain, tingling or numbness. Uses walker at baseline. Since that time, been taking tramadol q6hrs for pain with some relief. However on Friday night, low back pain suddenly worse without further falling. Daughter stopped tramadol and tried roxicodone left over from May but pain progressively worse. Patient crying, not able to sleep last night, difficulty walking. Lives at home with  who has Alzheimer's dementia requiring home aides. Daughter report increased urinary incontinence from baseline in past 2 days (wears incontinence pad baseline but those have been getting wet). No fever, chills, dysuria, hematuria. No leg weakness, numbness or pain in legs. No CP, abdominal pain, n/v, constipation. By problem. .. She was admitted for pain control and consideration of kyphoplasty, which she underwent on 9/4. She continues to have siginficant pain. She is ambulating with difficulty, with walker. She has had some constipation, presumably related to increased narcotic use for pain. Discharge Exam: 
See today's progress note. Disposition: To be determined Patient Instructions:  
Current Discharge Medication List  
  
 START taking these medications Details  
oxyCODONE IR (ROXICODONE) 5 mg immediate release tablet Take 1 Tab by mouth every four (4) hours as needed. Max Daily Amount: 30 mg. 
Qty: 30 Tab, Refills: 0 Associated Diagnoses: Closed compression fracture of first lumbar vertebra, sequela  
  
senna-docusate (PERICOLACE) 8.6-50 mg per tablet Take 1 Tab by mouth daily. Qty: 30 Tab, Refills: 0 CONTINUE these medications which have NOT CHANGED Details  
!! gabapentin (NEURONTIN) 300 mg capsule Take 300 mg by mouth every evening. alendronate (FOSAMAX) 70 mg tablet Take 70 mg by mouth every seven (7) days. coenzyme q10 (CO Q-10) 10 mg cap Take 1 Tab by mouth every evening. !! gabapentin (NEURONTIN) 300 mg capsule Take 600 mg by mouth daily. Indications: 2 TABS TWICE DAILY  
  
glucosamine-chondroitin (ARTHX) 500-400 mg cap Take 1 Cap by mouth two (2) times a day. b complex vitamins tablet Take 1 Tab by mouth daily. SYNTHROID 75 mcg tablet TAKE 1 TABLET BY MOUTH ON  MONDAY THROUGH SATURDAY AND 1/2 TAB ON SUNDAY AT [facility] MEDICALLY NECESSARY Qty: 85 Tab, Refills: 3  
  
escitalopram oxalate (LEXAPRO) 10 mg tablet TAKE 1 TABLET BY MOUTH  DAILY Qty: 90 Tab, Refills: 3  
  
atorvastatin (LIPITOR) 10 mg tablet Take 1 tablet by mouth  daily 
Qty: 90 Tab, Refills: 3  
  
calcium-cholecalciferol, D3, (CALTRATE 600+D) tablet Take 2 Tabs by mouth daily. POLYETHYLENE GLYCOL 3350 (MIRALAX PO) Take  by mouth. 1-2 
 tsp daily  
  
aspirin delayed-release 81 mg tablet Take 81 mg by mouth daily. !! - Potential duplicate medications found. Please discuss with provider. STOP taking these medications  
  
 traMADol (ULTRAM) 50 mg tablet Comments:  
Reason for Stopping:   
   
  
 
Activity: PT/OT Eval and Treat Diet: Cardiac Diet Follow-up with Dr. Zacarias Riedel Signed: 
Jigar Oropeza MD 
9/6/2018 
8:39 AM 
 
 Note: Greater than 30 minutes were spent on activities related to this discharge.

## 2018-09-06 NOTE — PROGRESS NOTES
CM sent referral to [facility] and advised of discharge orders. CM will follow-up to determine if they have bed availability. DOUGLAS Pastor,  [facility],4Th Floor Care Manager [phone]

## 2018-09-06 NOTE — PROGRESS NOTES
Reason for Admission: Low Back Pain; Compression fracture of L1 lumbar vertebra;         
RRAT Score: 17 Do you (patient/family) have any concerns for transition/discharge? Pt family wanted to ensure pt goes to inpatient rehab at discharge and ensure POA is contacted once determination has been made Plan for utilizing home health:  Pt and family prefers [facility] at discharge Likelihood of readmission? MODERATE Transition of Care Plan:     
 
Pt is an 81 y/o  female admitted for Low Back Pain; Compression fracture of L1 lumbar vertebra; Pt lives with spouse in a one story home (with a basement, she doesn't utilize). Pt has three steps to the entrance of her residence. Pt is independent with ADLs not to include driving at baseline. Pt had previous HH with unknown provider. Pt has previous [facility] rehab but no SNF provider. Pt has access to rolling walker, shower chair, cane, wheelchair and bedside commode. Pt prefers to use [facility] pharmacy at the intersection of [address] [place] [address] and [facility] in [place], [place]. Pt will be transported via [facility] transport to [facility] at discharge, if accepted. CM met with pt to complete initial assessment. Pt is alert and oriented to person, place,time and situation. CM will continue to follow pt to assist with discharge plans as needed. CM spoke with pt POA to advise of [facility] pending referral. CM will provide updates once [facility] has made determination. Care Management Interventions PCP Verified by CM: Yes Mode of Transport at Discharge: Other (see comment) Transition of Care Consult (CM Consult): Other, Discharge Planning Discharge Durable Medical Equipment: No 
Health Maintenance Reviewed: Yes Physical Therapy Consult: Yes Occupational Therapy Consult: Yes Speech Therapy Consult: No 
Current Support Network: Lives with Spouse, Own Home Confirm Follow Up Transport: Family Plan discussed with Pt/Family/Caregiver:  Yes 
 Freedom of Choice Offered: Yes Discharge Location Discharge Placement: Rehab hospital/unit acute DOUGLAS Ferrer,  [facility],4Th Floor Care Manager [phone]

## 2018-09-06 NOTE — PROGRESS NOTES
Problem: Pain Goal: *Control of Pain Outcome: Progressing Towards Goal 
Patient able to tolerate ambulating and independent gross motor movements. Ambulates well with walker and limited assistance.

## 2018-09-06 NOTE — PROGRESS NOTES
Pt has been accepted by [facility] and will discharge to [facility] at 1400. Call report number is [phone] with bed assignment provided once report is called. CM advised pt and pt daughter of approval and potential transfer time for admission into [facility]. Pt will be transported via [facility] transport to [facility]. There were no additional discharge needs. Care Management Interventions PCP Verified by CM: Yes Mode of Transport at Discharge: Other (see comment) Transition of Care Consult (CM Consult): Other, Discharge Planning Discharge Durable Medical Equipment: No 
Health Maintenance Reviewed: Yes Physical Therapy Consult: Yes Occupational Therapy Consult: Yes Speech Therapy Consult: No 
Current Support Network: Lives with Spouse, Own Home Confirm Follow Up Transport: Family Plan discussed with Pt/Family/Caregiver: Yes Freedom of Choice Offered: Yes Discharge Location Discharge Placement: Rehab hospital/unit acute DOUGLAS Pastor, [place] [address] [facility],[address] Care Manager [phone]

## 2018-09-06 NOTE — PROGRESS NOTES
INTERNAL MEDICINE ATTENDING NOTE 
 
S: Ms. Jennifer Rao was seen by me today during rounds. At this time, she is resting comfortably. Pain still severe; \"I don't feel any better. \"   ROS otherwise unremarkable except as noted elsewhere. O: Blood pressure 150/84, pulse 76, temperature 97.8 °F (36.6 °C), resp. rate 18, height 5' 5\" (1.651 m), weight 165 lb (74.8 kg), SpO2 92 %. Gen: Patient is in no acute distress. Lungs: CTAB. Heart: RRR. Abd: S, NT, ND, BS present. Extremities: Warm. No results found for this or any previous visit (from the past 12 hour(s)). A:  
  Compression fracture of L1 lumbar vertebra ([address] (9/3/2018) Active Problems: Low back pain (9/3/2018) Constipation, likely due to pain meds. Difficulty walking (9/3/2018) P:  Continue pain meds. PT/ OT. S/p Kyphoplasty via IR. Enema toda. Discharge planning--patient's family wants inpatient rehab. Aidan Smith MD, Kamaljit Nunn Best contact is via Pager: [phone], or via hospital  at [phone]

## 2018-09-06 NOTE — PROGRESS NOTES
Bedside shift change report given to Bonnie Samayoa (oncoming nurse) by Cristian Johnson RN (offgoing nurse). Report included the following information SBAR.

## 2018-09-06 NOTE — ROUTINE PROCESS
9/6/2018 
7:57 AM 
 
Bedside and Verbal shift change report given to Sherri Painter, [place] (oncoming nurse) by Hetal Mendoza RN (offgoing nurse). Report included the following information SBAR, Kardex, ED Summary, Procedure Summary, Intake/Output, MAR, Accordion, Recent Results and Med Rec Status.

## 2018-09-06 NOTE — DISCHARGE INSTRUCTIONS
Back Pain: Care Instructions  Your Care Instructions    Back pain has many possible causes. It is often related to problems with muscles and ligaments of the back. It may also be related to problems with the nerves, discs, or bones of the back. Moving, lifting, standing, sitting, or sleeping in an awkward way can strain the back. Sometimes you don't notice the injury until later. Arthritis is another common cause of back pain. Although it may hurt a lot, back pain usually improves on its own within several weeks. Most people recover in 12 weeks or less. Using good home treatment and being careful not to stress your back can help you feel better sooner. Follow-up care is a key part of your treatment and safety. Be sure to make and go to all appointments, and call your doctor if you are having problems. It's also a good idea to know your test results and keep a list of the medicines you take. How can you care for yourself at home? · Sit or lie in positions that are most comfortable and reduce your pain. Try one of these positions when you lie down:  ¨ Lie on your back with your knees bent and supported by large pillows. ¨ Lie on the floor with your legs on the seat of a sofa or chair. Duayne Lark on your side with your knees and hips bent and a pillow between your legs. ¨ Lie on your stomach if it does not make pain worse. · Do not sit up in bed, and avoid soft couches and twisted positions. Bed rest can help relieve pain at first, but it delays healing. Avoid bed rest after the first day of back pain. · Change positions every 30 minutes. If you must sit for long periods of time, take breaks from sitting. Get up and walk around, or lie in a comfortable position. · Try using a heating pad on a low or medium setting for 15 to 20 minutes every 2 or 3 hours. Try a warm shower in place of one session with the heating pad. · You can also try an ice pack for 10 to 15 minutes every 2 to 3 hours.  Put a thin cloth between the ice pack and your skin. · Take pain medicines exactly as directed. ¨ If the doctor gave you a prescription medicine for pain, take it as prescribed. ¨ If you are not taking a prescription pain medicine, ask your doctor if you can take an over-the-counter medicine. · Take short walks several times a day. You can start with 5 to 10 minutes, 3 or 4 times a day, and work up to longer walks. Walk on level surfaces and avoid hills and stairs until your back is better. · Return to work and other activities as soon as you can. Continued rest without activity is usually not good for your back. · To prevent future back pain, do exercises to stretch and strengthen your back and stomach. Learn how to use good posture, safe lifting techniques, and proper body mechanics. When should you call for help? Call your doctor now or seek immediate medical care if:    · You have new or worsening numbness in your legs.     · You have new or worsening weakness in your legs. (This could make it hard to stand up.)     · You lose control of your bladder or bowels.    Watch closely for changes in your health, and be sure to contact your doctor if:    · You have a fever, lose weight, or don't feel well.     · You do not get better as expected. Where can you learn more? Go to http://vikash-maria g.info/. Enter C396 in the search box to learn more about \"Back Pain: Care Instructions. \"  Current as of: 2017  Content Version: 11.7  © 0993-1062 [employer]. Care instructions adapted under license by [employer] (which disclaims liability or warranty for this information). If you have questions about a medical condition or this instruction, always ask your healthcare professional. [place] [address] any warranty or liability for your use of this information.   PATIENT DISCHARGE INSTRUCTIONS      PATIENT DISCHARGE INSTRUCTIONS    Jonathan Parra / [id number] : 3/6/1932    Admitted 9/3/2018 Discharged: 9/6/2018       · It is important that you take the medication exactly as they are prescribed. · Keep your medication in the bottles provided by the pharmacist and keep a list of the medication names, dosages, and times to be taken in your wallet. · Do not take other medications without consulting your doctor.      What to do at Home    Recommended Diet: Cardiac Diet    Recommended Activity: PT/OT Eval and Treat        Signed By: Najma Prabhakar MD     September 6, 2018

## 2018-09-07 NOTE — PROGRESS NOTES
Transition of Care Coordination/Hospital to Post Acute Facility: 
  
Date/Time:  2018 1:57 PM 
 
Patient was admitted to [facility] on 9/3/18 and discharged on 18 S/p GLF resulting in a L1 feracture. Patient was transferred to [place] [address] for continuation of care. Inpatient RRAT score: 17 Top Challenges reviewed Method of communication with care team :chart routing Nurse Navigator(NN) spoke with Frank to provide introduction to self and explanation of the Nurse Navigator Role. Verified name and  as patient identifiers. Discussed and reviewed  medication reconciliation ACP:  
Does the patient have a current ACP (including DDNR):  yes Does the post acute facility have a copy of the patients ACP:  yes Medication(s):  
New Medications at Discharge: My Medications  
   
START taking these medications   
   Instructions Each Dose to Equal   Morning Noon Evening Bedtime  
  oxyCODONE IR 5 mg immediate release tablet Commonly known as:  ROXICODONE  
    
Your last dose was:    
    
Your next dose is:    
    
    
  Take 1 Tab by mouth every four (4) hours as needed. Max Daily Amount: 30 mg.  
  5 mg  
       
     
     
     
   
  senna-docusate 8.6-50 mg per tablet Commonly known as:  PERICOLACE  
    
Your last dose was:    
    
Your next dose is:    
    
    
  Take 1 Tab by mouth daily.  
         
 
 
Changed Medications at Discharge: CHANGE how you take these medications   
   Instructions Each Dose to Equal   Morning Noon Evening Bedtime  
  atorvastatin 10 mg tablet Commonly known as:  LIPITOR What changed:  See the new instructions.  
    
Your last dose was:    
    
Your next dose is:    
    
    
  Take 1 tablet by mouth  daily          
     
     
     
   
  escitalopram oxalate 10 mg tablet Commonly known as:  Kate Blair What changed:  See the new instructions.  
    
Your last dose was:    
[employer]  
 Your next dose is:    
         
 
 
Discontinued Medications at Discharge: STOP taking these medications   
    
  traMADol 50 mg tablet Commonly known as:  Jazmin Kearney  
   
 
 
  
PCP/Specialist follow up: Future Appointments Date Time Provider Mira Moreno 11/8/2018 10:30 AM Bonnie Abel MD [facility] SILVIO CORCORAN  
11/26/2018 10:30 AM Anish Resendez MD [facility] [address] Opportunity to ask questions was provided. Contact information was provided for future reference or further questions. Will continue to monitor.

## 2018-11-08 NOTE — PROGRESS NOTES
SUBJECTIVE:  
Ms. Quinn Sepulveda presents today for follow up. Chief Complaint Patient presents with  Hypertension  
  pt here today for 6 month f.u  
 Immunization/Injection  
  pt wants a flu shot Her back and leg pain are better. It is recalled that she had syncopal episodes and falls in spring 2018. She was admitted to [facility] on 5/19 after a fall with fracture of L1, and questionably worsened fracture of R proximal fibula: Syncope and collapse (5/19/2018): Recurrent; has been following with cardiology, and prior work up with Dr. Sherry Soto [facility] [place] [facility]) and Dr. Ever Ellison at [facility] has been unrevealing. Epi Connolly has a pacemaker, with rate readjusted after syncopal spell in April.  Cardiology consulted--She was seen by Dr. Mitra Vaughan. Echo as above. Pacemaker interrogated and okay. Syncope: We noted previously: Seeing Dr. Sherry Soto and Dr. Liliam Quiros to follow up with the pacemaker. Neuropathy: She continues to have ongoing foot pain, burning and stinging. She saw Dr. Ramesh Valencia with \[facility]\"" foot clinic in the past, and was started on lyrica. This helped at first but then \"the second bottle didn't seem to do anything, so I stopped it. \" Gabapentin not tolerated at higher doses than 100 mg. Insomnia: \"Mostly I sleep okay. \" She still has trouble with sleep onset some nights; also sleep maintenance. Some nights she is okay; other nights she pops awake sometimes at about 1 AM, and then awakens every hour. She denies that the pain is waking her up. \"I don't know what it is. I'm not worried about anything. \"  
R knee pain still present. \"I live with that. \"  She has seen Dr. Tanya Snyder, not recently. She has had three injections. Putting off TKR for now. Knee pain is noted when going up stairs or when arising from chair. Has right SI pain, which is chronic. Anxiety and depression may be a bit better. She denies any problem at this time. It is recalled that she had neuropsych testing which revealed dementia, plus some overlay of depression. Stable. Never saw Dr. Wendie Jian. Tried aricept and felt it gave her bad dreams. For all issues, see A and P. PAST MEDICAL HISTORY: She has a past medical history of Hypertension (2/16/2011); Back pain (2/16/2011); Epigastric pain (2/16/2011); Hyperlipidemia (2/16/2011); Neuropathy (2/16/2011); Hypothyroidism (2/16/2011); Urinary frequency (2/16/2011); and Osteoporosis (2/16/2011). Conjunctivitis, refractory (2011, seeing Dr. Matthew Cordova) PAST SURGICAL HISTORY: Partial resection of colon due to ca, tonsillectomy 1950. Last colonoscopy by Dr. Donta Conte was a few yr ago. ALLERGIES: Amoxicillin; Aricept [donepezil]; Augmentin [amoxicillin-pot clavulanate]; Pcn [penicillins]; and Zithromax [azithromycin] MEDICATIONS: 
Current Outpatient Medications on File Prior to Visit Medication Sig Dispense Refill  atorvastatin (LIPITOR) 10 mg tablet TAKE 1 TABLET BY MOUTH  DAILY 90 Tab 3  
 melatonin 3 mg tablet Take  by mouth.  oxyCODONE IR (ROXICODONE) 5 mg immediate release tablet Take 1 Tab by mouth every four (4) hours as needed. Max Daily Amount: 30 mg. 30 Tab 0  
 senna-docusate (PERICOLACE) 8.6-50 mg per tablet Take 1 Tab by mouth daily. 30 Tab 0  
 alendronate (FOSAMAX) 70 mg tablet Take 70 mg by mouth every seven (7) days.  coenzyme q10 (CO Q-10) 10 mg cap Take 1 Tab by mouth every evening.  gabapentin (NEURONTIN) 300 mg capsule Take 600 mg by mouth daily. Indications: 2 TABS TWICE DAILY  glucosamine-chondroitin (ARTHX) 500-400 mg cap Take 1 Cap by mouth two (2) times a day.  b complex vitamins tablet Take 1 Tab by mouth daily.     
 SYNTHROID 75 mcg tablet TAKE 1 TABLET BY MOUTH ON  MONDAY THROUGH SATURDAY AND 1/2 TAB ON SUNDAY AT [facility] MEDICALLY NECESSARY 85 Tab 3  
 escitalopram oxalate (LEXAPRO) 10 mg tablet TAKE 1 TABLET BY MOUTH DAILY (Patient taking differently: TAKE 1 TABLET BY MOUTH  DAILY qpm) 90 Tab 3  
 calcium-cholecalciferol, D3, (CALTRATE 600+D) tablet Take 2 Tabs by mouth daily.  POLYETHYLENE GLYCOL 3350 (MIRALAX PO) Take  by mouth. 1-2 
 tsp daily  aspirin delayed-release 81 mg tablet Take 81 mg by mouth daily. No current facility-administered medications on file prior to visit. FAMILY HISTORY: Her father  at age 61 of MI, and also had HTN. Her family history also includes diabetes in her brother, mother, and sister. SOCIAL HISTORY: She is . Retired. She reports that  has never smoked. she has never used smokeless tobacco. She reports that she does not drink alcohol or use drugs. REVIEW OF SYSTEMS: See above; Complete ROS otherwise negative. In addition, complains of \"feet and hands always feel cold, even during warm weather,\" brain feels like it is in a \"fog\", muscles ache, skin is dry and flaky. OBJECTIVE:  
Visit Vitals /83 (BP 1 Location: Left arm, BP Patient Position: Sitting) Pulse 75 Temp 97.7 °F (36.5 °C) (Oral) Resp 18 Ht 5' 5\" (1.651 m) Wt 173 lb 3.2 oz (78.6 kg) SpO2 94% BMI 28.82 kg/m² Gen: Pleasant 80 y.o. W female in NAD. HEENT: moist pink. No erythema or exudates. HEART: RRR, No M/G/R.  LUNGS: CTAB No W/R. Effort is unlabored. ABDOMEN: S, NT, ND, BS+. EXTREMITIES: Warm. No C/C/E. Lab Results Component Value Date/Time WBC 6.2 2018 04:40 AM  
 HGB 13.8 2018 04:40 AM  
 HCT 40.9 2018 04:40 AM  
 PLATELET 308  04:40 AM  
 MCV 92.7 2018 04:40 AM  
 
Lab Results Component Value Date/Time  Sodium 137 2018 04:40 AM  
 Potassium 3.8 2018 04:40 AM  
 Chloride 102 2018 04:40 AM  
 CO2 26 2018 04:40 AM  
 Anion gap 9 2018 04:40 AM  
 Glucose 96 2018 04:40 AM  
 BUN 13 2018 04:40 AM  
 Creatinine 0.62 2018 04:40 AM  
 BUN/Creatinine ratio 21 (H) 09/07/2018 04:40 AM  
 GFR est AA >60 09/07/2018 04:40 AM  
 GFR est non-AA >60 09/07/2018 04:40 AM  
 Calcium 8.8 09/07/2018 04:40 AM  
 
 
ASSESSMENT/ PLAN:    
1. Syncope: Seeing Cardiolgoy; no events in last few months. 2. Hypertension: Given syncope, fine today; In the past we've noted:  we'll need to probably let this run high. 3. Neuropathy: Off gabapentin. Progressively worsening over time--relatively stable right now. Previously referred to Neurology. As noted 2016 visit: We will put her on a trial of amitriptyline--. She was on cymbalta for a time (stopped when lyrica was begun); more recently lyrica. She had some success initially on this, and I note that her dose was rather small. She could try this again in the future, but for now she is leery to do so due to cost of the med. 4. Insomnia: Ongoing. Mainly problem with sleep maintenance. We'll try prn zolpidem. 5. Epigastric pain: Better. On Ranitidine. 6. Dementia: Stable. She is s/p neuropsych testing with Dr. Mcleod Poster. Apparently didn't tolerate aricept. Also off namenda--not sure why. Referred previously to neurology. 7. Depression:  Better, per patient. Denies current depression. 8. Hypothyroidism: Follow up with endocrinologist Dr. Lisbet Stark. 9. 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 Stable. 10. Abnormal LFT: prior work up with ultrasound and labs unrevealing. Monitor labs. 11. Hyperlipidemia: Continue fish oil; Off statin right now. 12. Urinary frequency: Stable on ditropan. 13. Osteoporosis: Continue fosamax and Vit D +Ca. Medications ordered as noted above and risks/side effects/benefits discussed. Diagnosis(es) and condition(s) explained to patient and questions answered. Literature provided as appropriate. Follow-up Disposition: 
Return in about 6 months (around 5/8/2019) for HTN.

## 2018-11-08 NOTE — PROGRESS NOTES
1. Have you been to the ER, urgent care clinic since your last visit? Hospitalized since your last visit? NO 
 
2. Have you seen or consulted any other health care providers outside of the [address] [place] [address] since your last visit? Include any pap smears or colon screening.  NO

## 2018-11-19 NOTE — PROGRESS NOTES
[facility] [place]        [phone]                             NEW PATIENT HPI/FOLLOW-UP    NAME:  Ashley Joaquin   :   3/6/1932   MRN:   [id number]   PCP:  Atif Lester MD           Subjective: The patient is a 80y.o. year old female  who returns for a routine follow-up. Since the last visit, patient reports no new symptoms. Denies change in exercise tolerance, chest pain, edema, medication intolerance, palpitations, shortness of breath, PND/orthopnea wheezing, sputum, syncope, dizziness or light headedness. Doing satisfactorily. Review of Systems  General: Pt denies excessive weight gain or loss. Pt is able to conduct ADL's. Respiratory: Denies shortness of breath, CHOW, wheezing or stridor.   Cardiovascular: Denies precordial pain, palpitations, edema or PND  Gastrointestinal: Denies poor appetite, indigestion, abdominal pain or blood in stool  Peripheral vascular: Denies claudication, leg cramps  Neurological: Denies paresthesias, tingling.numbness  Psychiatric: Denies anxiety,depression,fatigue  Musculoskeletal: Denies pain,tenderness, soreness,swelling      Past Medical History:   Diagnosis Date    Acquired hypothyroidism 10/30/2017    Arthritis     Back pain 2011    Cancer ([facility] [address] 1993    colon    Chronic pain     in her back    Dementia     Mild; Neuropsych testing with Dr. Priti Osullivan in     Dementia 2011    Epigastric pain 2011    Essential hypertension     Essential hypertension 2015    Hyperlipidemia 2011    Hypertension 2011    Hypothyroidism 2011    s/p VALDES in  and  for hyperthyroidism    Neuropathy 2011    Orthostasis 2012    Orthostasis 2012    Osteoporosis 2011    Pacemaker     Persistent disorder of initiating or maintaining sleep 2015    S/P cardiac pacemaker procedure 2011    SSS (sick sinus syndrome) ([facility] [address] 2011    Syncope     Syncope and collapse 8/19/2011    Urinary frequency 2/16/2011     Patient Active Problem List    Diagnosis Date Noted    Low back pain 09/03/2018    Difficulty walking 09/03/2018    Compression fracture of L1 lumbar vertebra (HCC) 09/03/2018    Closed compression fracture of first lumbar vertebra (HCC) 05/22/2018    Syncope and collapse 05/19/2018    Acquired hypothyroidism 10/30/2017    Stenosis of both internal carotid arteries 12/16/2016    Syncope 12/15/2016    Hypokalemia 12/15/2016    Essential hypertension 12/04/2015    Persistent disorder of initiating or maintaining sleep 04/22/2015    Anxiety disorder 12/04/2012    Vasovagal syndrome 07/11/2012    Syncope due to orthostatic hypotension 07/11/2012    Orthostasis 07/06/2012    Essential hypertension, benign 06/01/2012    Mobitz (type) II atrioventricular block 06/01/2012    Cardiac pacemaker in situ 06/01/2012    DJD (degenerative joint disease) of knee 01/25/2012    S/P cardiac pacemaker procedure 08/24/2011    SSS (sick sinus syndrome) ([facility] [address] 08/20/2011    Depression 07/21/2011    Dementia 07/21/2011    Personal history of colon cancer 06/23/2011    Epigastric pain 02/16/2011    Hyperlipidemia 02/16/2011    Neuropathy 02/16/2011    Postablative hypothyroidism 02/16/2011    Urinary frequency 02/16/2011    Osteoporosis 02/16/2011      Past Surgical History:   Procedure Laterality Date    HX GI  1993    colon resection    HX HEENT      tonsillectomy in 1950    HX PACEMAKER      DE ENTEROSCOPY > 2ND PRTN ILEUM CONTROL BLEEDING  6/23/2011          Allergies   Allergen Reactions    Amoxicillin Unknown (comments)    Aricept [Donepezil] Other (comments)     Bad dreams.     Augmentin [Amoxicillin-Pot Clavulanate] Unknown (comments)    Pcn [Penicillins] Rash    Zithromax [Azithromycin] Unknown (comments)      Family History   Problem Relation Age of Onset    Diabetes Mother     Thyroid Disease Mother     Heart Disease Mother    [address] [place] [address] Bright Hypertension Father     Heart Disease Father     Diabetes Sister     Diabetes Brother       Social History     Socioeconomic History    Marital status:      Spouse name: Not on file    Number of children: Not on file    Years of education: Not on file    Highest education level: Not on file   Social Needs    Financial resource strain: Not on file    Food insecurity - worry: Not on file    Food insecurity - inability: Not on file   wumo needs - medical: Not on file   wumo needs - non-medical: Not on file   Occupational History    Not on file   Tobacco Use    Smoking status: Never Smoker    Smokeless tobacco: Never Used   Substance and Sexual Activity    Alcohol use: No    Drug use: No    Sexual activity: Not on file   Other Topics Concern    Not on file   Social History Narrative    Lives in [facility] with  of 54 years. Has a son and daughter. Used to work as a  for [employer] and at [employer] firm. Likes to garden and craft. Current Outpatient Medications   Medication Sig    atorvastatin (LIPITOR) 10 mg tablet TAKE 1 TABLET BY MOUTH  DAILY    melatonin 3 mg tablet Take  by mouth.  oxyCODONE IR (ROXICODONE) 5 mg immediate release tablet Take 1 Tab by mouth every four (4) hours as needed. Max Daily Amount: 30 mg.    senna-docusate (PERICOLACE) 8.6-50 mg per tablet Take 1 Tab by mouth daily.  alendronate (FOSAMAX) 70 mg tablet Take 70 mg by mouth every seven (7) days.  coenzyme q10 (CO Q-10) 10 mg cap Take 1 Tab by mouth every evening.  gabapentin (NEURONTIN) 300 mg capsule Take 600 mg by mouth daily. Indications: 2 TABS TWICE DAILY    glucosamine-chondroitin (ARTHX) 500-400 mg cap Take 1 Cap by mouth two (2) times a day.  b complex vitamins tablet Take 1 Tab by mouth daily.     SYNTHROID 75 mcg tablet TAKE 1 TABLET BY MOUTH ON  MONDAY THROUGH SATURDAY AND 1/2 TAB ON SUNDAY AT [facility] MEDICALLY NECESSARY    escitalopram oxalate (LEXAPRO) 10 mg tablet TAKE 1 TABLET BY MOUTH  DAILY (Patient taking differently: TAKE 1 TABLET BY MOUTH  DAILY qpm)    calcium-cholecalciferol, D3, (CALTRATE 600+D) tablet Take 2 Tabs by mouth daily.  POLYETHYLENE GLYCOL 3350 (MIRALAX PO) Take  by mouth. 1-2   tsp daily    aspirin delayed-release 81 mg tablet Take 81 mg by mouth daily. No current facility-administered medications for this visit. I have reviewed the nurses notes, vitals, problem list, allergy list, medical history, family medical, social history and medications. Objective:     Physical Exam:     Vitals:    11/19/18 0934 11/19/18 0959   BP: (!) 146/96 (!) 152/92   Pulse: 76    Resp: 18    SpO2: 98%    Weight: 174 lb 12.8 oz (79.3 kg)    Height: 5' 5\" (1.651 m)     Body mass index is 29.09 kg/m². General: Well developed, in no acute distress. HEENT: No carotid bruits, no JVD, trach is midline. Heart:  Normal S1/S2 negative S3 or S4. Regular, Gr 2/6 SEmurmur, -gallop or rub.   Respiratory: Clear bilaterally, no wheezing or rales  Abdomen:   Soft, non-tender, bowel sounds are active.   Extremities:  No edema, normal cap refill, no cyanosis. Neuro: A&Ox3, speech clear, gait stable. Skin: Skin color is normal. No rashes or lesions. No diaphoresis. Vascular: 2+ pulses symmetric in all extremities        Data Review:       Cardiographics:    EKG: V-pacing with atrial tracking    Cardiology Labs:    Results for orders placed or performed during the hospital encounter of 05/19/18   EKG, 12 LEAD, INITIAL   Result Value Ref Range    Ventricular Rate 75 BPM    Atrial Rate 75 BPM    P-R Interval 236 ms    QRS Duration 146 ms    Q-T Interval 416 ms    QTC Calculation (Bezet) 464 ms    Calculated R Axis -56 degrees    Calculated T Axis 101 degrees    Diagnosis       AV dual-paced rhythm with prolonged AV conduction  Abnormal ECG  When compared with ECG of 23-MAR-2018 21:34,  Vent.  rate has increased BY   3 BPM  Confirmed by Nish Remberto ([id number]) on 5/20/2018 3:32:48 PM         Lab Results   Component Value Date/Time    Cholesterol, total 159 12/16/2016 03:01 AM    HDL Cholesterol 63 12/16/2016 03:01 AM    LDL, calculated 80.8 12/16/2016 03:01 AM    Triglyceride 76 12/16/2016 03:01 AM    CHOL/HDL Ratio 2.5 12/16/2016 03:01 AM       Lab Results   Component Value Date/Time    Sodium 137 09/07/2018 04:40 AM    Potassium 3.8 09/07/2018 04:40 AM    Chloride 102 09/07/2018 04:40 AM    CO2 26 09/07/2018 04:40 AM    Anion gap 9 09/07/2018 04:40 AM    Glucose 96 09/07/2018 04:40 AM    BUN 13 09/07/2018 04:40 AM    Creatinine 0.62 09/07/2018 04:40 AM    BUN/Creatinine ratio 21 (H) 09/07/2018 04:40 AM    GFR est AA >60 09/07/2018 04:40 AM    GFR est non-AA >60 09/07/2018 04:40 AM    Calcium 8.8 09/07/2018 04:40 AM    Bilirubin, total 0.8 05/20/2018 04:48 AM    AST (SGOT) 25 05/20/2018 04:48 AM    Alk. phosphatase 71 05/20/2018 04:48 AM    Protein, total 6.5 05/20/2018 04:48 AM    Albumin 3.0 (L) 05/20/2018 04:48 AM    Globulin 3.5 05/20/2018 04:48 AM    A-G Ratio 0.9 (L) 05/20/2018 04:48 AM    ALT (SGPT) 25 05/20/2018 04:48 AM          Assessment:       ICD-10-CM ICD-9-CM    1. Essential hypertension, benign I10 401.1 AMB POC EKG ROUTINE W/ 12 LEADS, INTER & REP   2. Syncope due to orthostatic hypotension I95.1 458.0 AMB POC EKG ROUTINE W/ 12 LEADS, INTER & REP   3. Cardiac pacemaker in situ Z95.0 V45.01 AMB POC EKG ROUTINE W/ 12 LEADS, INTER & REP   4. Acquired hypothyroidism E03.9 244.9    5. Alzheimer's dementia without behavioral disturbance, unspecified timing of dementia onset G30.9 331.0     F02.80 294.10    6. Difficulty walking R26.2 719.7          Discussion: Patient presents at this time stable from a cardiac perspective. CHOW stable. Walks with walker. Pleased with present status. Plan: 1. Continue same meds. Lipid profile and labs followed by PCP.     2.Encouraged to exercise to tolerance, lose weight and follow low fat, low cholesterol, low sodium predominantly Plant-based (consider Mediterranean) diet. Call with questions or concerns. Will follow up any test results by phone and/or f/u here in office if needed. Kashif Potter 3.Follow up: 6 months    I have discussed the diagnosis with the patient and the intended plan as seen in the above orders. The patient has received an after-visit summary and questions were answered concerning future plans. I have discussed any concerning medication side effects and warnings with the patient as well.     Ivette Melendrez MD  11/19/2018

## 2018-11-19 NOTE — PROGRESS NOTES
1. Have you been to the ER, urgent care clinic since your last visit? Hospitalized since your last visit? No    2. Have you seen or consulted any other health care providers outside of the [address] [place] [address] since your last visit? Include any pap smears or colon screening. No    Chief Complaint   Patient presents with    Hypertension     3 mo appt. Denied cardiac symptoms.

## 2018-11-25 NOTE — ED PROVIDER NOTES
EMERGENCY DEPARTMENT HISTORY AND PHYSICAL EXAM 
 
 
Date: 11/25/2018 Patient Name: Mariann Payan History of Presenting Illness Chief Complaint Patient presents with  Fall  Shoulder Pain Right shoulder pain s/p fall History Provided By: Patient HPI: Mariann Payan, 80 y.o. female with PMHx significant for HTN, hyperlipidemia, neuropathy, colon cancer, dementia, hypothyroidism, presents via EMS to the ED following GLF at 12 AM today. Pt reports mechanical fall on the way to the bathroom. She denies any LOC or head trauma. She notes new onset moderate R shoulder pain, which is exacerbated with ROM. Pt denies taking any OTC medications PTA for relief. She specifically denies any HA, fevers, chills, nausea, vomiting, abdominal pain, CP, or diarrhea. There are no other complaints, changes, or physical findings at this time. PCP: Jon De Jesus MD 
SHx: (-)smoker, (-)EtOH use, (-)illicit drug use Current Outpatient Medications Medication Sig Dispense Refill  diclofenac (VOLTAREN) 1 % gel Apply 2 g to affected area four (4) times daily. 100 g 0  
 atorvastatin (LIPITOR) 10 mg tablet TAKE 1 TABLET BY MOUTH  DAILY 90 Tab 3  
 melatonin 3 mg tablet Take  by mouth.  oxyCODONE IR (ROXICODONE) 5 mg immediate release tablet Take 1 Tab by mouth every four (4) hours as needed. Max Daily Amount: 30 mg. 30 Tab 0  
 senna-docusate (PERICOLACE) 8.6-50 mg per tablet Take 1 Tab by mouth daily. 30 Tab 0  
 alendronate (FOSAMAX) 70 mg tablet Take 70 mg by mouth every seven (7) days.  coenzyme q10 (CO Q-10) 10 mg cap Take 1 Tab by mouth every evening.  gabapentin (NEURONTIN) 300 mg capsule Take 600 mg by mouth daily. Indications: 2 TABS TWICE DAILY  glucosamine-chondroitin (ARTHX) 500-400 mg cap Take 1 Cap by mouth two (2) times a day.  b complex vitamins tablet Take 1 Tab by mouth daily.     
 SYNTHROID 75 mcg tablet TAKE 1 TABLET BY MOUTH ON  MONDAY THROUGH SATURDAY AND 1/2 TAB ON SUNDAY AT BEDTIME--[facility] MEDICALLY NECESSARY 85 Tab 3  
 escitalopram oxalate (LEXAPRO) 10 mg tablet TAKE 1 TABLET BY MOUTH  DAILY (Patient taking differently: TAKE 1 TABLET BY MOUTH  DAILY qpm) 90 Tab 3  
 calcium-cholecalciferol, D3, (CALTRATE 600+D) tablet Take 2 Tabs by mouth daily.  POLYETHYLENE GLYCOL 3350 (MIRALAX PO) Take  by mouth. 1-2 
 tsp daily  aspirin delayed-release 81 mg tablet Take 81 mg by mouth daily. Past History Past Medical History: 
Past Medical History:  
Diagnosis Date  Acquired hypothyroidism 10/30/2017  Arthritis  Back pain 2/16/2011  Cancer [facility]) 1993  
 colon  Chronic pain   
 in her back  Dementia 2011 Mild; Neuropsych testing with Dr. Dillon Benites in 2011  Dementia 7/21/2011  Epigastric pain 2/16/2011  Essential hypertension  Essential hypertension 12/4/2015  Hyperlipidemia 2/16/2011  Hypertension 2/16/2011  Hypothyroidism 2/16/2011  
 s/p VALDES in 1998 and 1999 for hyperthyroidism  Neuropathy 2/16/2011  Orthostasis 7/6/2012  Orthostasis 7/6/2012  Osteoporosis 2/16/2011  Pacemaker  Persistent disorder of initiating or maintaining sleep 4/22/2015  S/P cardiac pacemaker procedure 8/24/2011  
 SSS (sick sinus syndrome) ([facility] [address] 8/20/2011  Syncope  Syncope and collapse 8/19/2011  Urinary frequency 2/16/2011 Past Surgical History: 
Past Surgical History:  
Procedure Laterality Date  HX GI  1993  
 colon resection  HX HEENT    
 tonsillectomy in 1950  HX PACEMAKER    
 AK ENTEROSCOPY > 2ND PRTN ILEUM CONTROL BLEEDING  6/23/2011 Family History: 
Family History Problem Relation Age of Onset  Diabetes Mother  Thyroid Disease Mother  Heart Disease Mother  Hypertension Father  Heart Disease Father  Diabetes Sister  Diabetes Brother Social History: 
Social History Tobacco Use  Smoking status: Never Smoker  Smokeless tobacco: Never Used Substance Use Topics  Alcohol use: No  
 Drug use: No  
 
 
Allergies: Allergies Allergen Reactions  Amoxicillin Unknown (comments)  Aricept [Donepezil] Other (comments) Bad dreams.  Augmentin [Amoxicillin-Pot Clavulanate] Unknown (comments)  Pcn [Penicillins] Rash  Zithromax [Azithromycin] Unknown (comments) Review of Systems Review of Systems Constitutional: Negative for chills and fever. HENT: Negative for congestion and sore throat. Eyes: Negative for visual disturbance. Respiratory: Negative for cough and shortness of breath. Cardiovascular: Negative for chest pain and leg swelling. Gastrointestinal: Negative for abdominal pain, blood in stool, diarrhea and nausea. Endocrine: Negative for polyuria. Genitourinary: Negative for dysuria, flank pain, vaginal bleeding and vaginal discharge. Musculoskeletal: Positive for arthralgias (R shoulder). Negative for myalgias. Skin: Negative for rash. Allergic/Immunologic: Negative for immunocompromised state. Neurological: Negative for weakness and headaches. Psychiatric/Behavioral: Negative for confusion. Physical Exam  
Physical Exam  
Constitutional: She is oriented to person, place, and time. She appears well-developed and well-nourished. HENT:  
Head: Normocephalic and atraumatic. Moist mucous membranes No signs of head trauma Eyes: Conjunctivae are normal. Pupils are equal, round, and reactive to light. Right eye exhibits no discharge. Left eye exhibits no discharge. Neck: Normal range of motion. Neck supple. No tracheal deviation present. Cardiovascular: Normal rate, regular rhythm and normal heart sounds. No murmur heard. Pulmonary/Chest: Effort normal and breath sounds normal. No respiratory distress. She has no wheezes. She has no rales. Abdominal: Soft. Bowel sounds are normal. There is no tenderness. There is no rebound and no guarding. Musculoskeletal: She exhibits no edema, tenderness or deformity. Tenderness over R humerus No midline cervical spine tenderness Limited ROM of RUE Neurological: She is alert and oriented to person, place, and time. Neurovascular intact Skin: Skin is warm and dry. No rash noted. No erythema. Psychiatric: Her behavior is normal.  
Nursing note and vitals reviewed. Diagnostic Study Results Labs - No results found for this or any previous visit (from the past 12 hour(s)). Radiologic Studies -  
XR HUMERUS RT Final Result Initial Result:  
Impression:  
 IMPRESSION:  No acute abnormality. Narrative: EXAM:  XR HUMERUS RT 
 
INDICATION:   trauma. Additional history: Patient had a ground-level fall last night around midnight. Patient unable to get up and found this morning. Patient has neck, right 
shoulder and back pain. COMPARISON: None. FINDINGS: Two views of the right humerus demonstrate no fracture or other acute 
osseous, articular or soft tissue abnormality. CT Results  (Last 48 hours)  
          
 11/25/18 0955  CT HEAD WO CONT Final result Impression:  IMPRESSION:   
1. No evidence of acute intracranial abnormality by this modality. Narrative:  EXAM:  CT HEAD WO CONT INDICATION:   Headache, post trauma Additional history: Patient had a ground-level fall last night around midnight. Patient unable to get up and found this morning. COMPARISON: None. .  
TECHNIQUE:   
Unenhanced CT of the head was performed using 5 mm images. Coronal and sagittal  
reformats were produced. Brain and bone windows were generated. CT dose reduction was achieved through use of a standardized protocol tailored  
for this examination and automatic exposure control for dose modulation. Adolfo Reed   
FINDINGS:  
The ventricles and sulci are normal in size, shape and configuration and  
 midline. Calcifications in the basal ganglia There is no significant white  
matter disease. There is no intracranial hemorrhage, extra-axial collection,  
mass, mass effect or midline shift. The basilar cisterns are open. Intracranial  
atherosclerosis. No acute infarct is identified. The bone windows demonstrate no  
acute abnormalities. Opacified left maxillary sinus with rim calcification  
centrally and overlying hypertrophy of bone. .  
  
 11/25/18 1717  CT SPINE CERV WO CONT Final result Impression:  IMPRESSION:  
1. No visualized fracture or subluxation. Narrative:  INDICATION:  Cervical spine fracture. Additional history: Patient had a ground-level fall last night around midnight. Patient unable to get up and found this morning. Patient has neck, shoulder and  
back pain COMPARISON: None. .  
TECHNIQUE:     
Noncontrast axial CT imaging of the cervical spine was performed. Coronal and  
sagittal reconstructions were obtained. CT dose reduction was achieved through use of a standardized protocol tailored  
for this examination and automatic exposure control for dose modulation. [employer] FINDINGS:  
There are nonfocal degenerative changes. The alignment is normal. There is no  
visualized fracture or subluxation. Incidental imaging of the paraspinal soft tissues is unremarkable. The  
visualized lung apices are unremarkable. .  
  
  
 
Medical Decision Making I am the first provider for this patient. I reviewed the vital signs, available nursing notes, past medical history, past surgical history, family history and social history. Vital Signs-Reviewed the patient's vital signs. Patient Vitals for the past 12 hrs: 
 Temp Resp BP SpO2  
11/25/18 1014    94 % 11/25/18 1008 98.2 °F (36.8 °C) 18 186/89 94 % Pulse Oximetry Analysis - 94% on RA Records Reviewed: Nursing Notes, Old Medical Records and Previous Laboratory Studies Provider Notes (Medical Decision Making): DDx: fracture, contusion, cervical spine injury, closed head injury, intercranial bleed. Given that pt uses walker to ambulate may need to hospitalize. ED Course:  
Initial assessment performed. The patients presenting problems have been discussed, and they are in agreement with the care plan formulated and outlined with them. I have encouraged them to ask questions as they arise throughout their visit. 10:41 AM 
Discussed labs and imaging results with pt. Critical Care Time: 0 Disposition: 
Discharge Note: 
12:10 PM 
The pt is ready for discharge. The pt's signs, symptoms, diagnosis, and discharge instructions have been discussed and pt has conveyed their understanding. The pt is to follow up as recommended or return to ER should their symptoms worsen. Plan has been discussed and pt is in agreement. PLAN: 
1. Current Discharge Medication List  
  
START taking these medications Details  
diclofenac (VOLTAREN) 1 % gel Apply 2 g to affected area four (4) times daily. Qty: 100 g, Refills: 0  
  
  
 
2. Follow-up Information Follow up With Specialties Details Why Contact Info [facility] [place] EM[facility]ENC[facility] DEPT Emergency Medicine  If symptoms worsen [address] [facility] [address] [facility] 
[phone] Kayleen Hurt MD Internal Medicine Schedule an appointment as soon as possible for a visit  [facility] [address] [facility] [address] 
[phone] Return to ED if worse Diagnosis Clinical Impression: 1. Contusion of right upper extremity, initial encounter 2. Fall, initial encounter Attestations: This note is prepared by Janessa Avila, acting as a Scribe for Lucien Jaime DO. Lucien Jaime DO: The scribe's documentation has been prepared under my direction and personally reviewed by me in its entirety.  I confirm that the notes above accurately reflects all work, treatment, procedures, and medical decision making performed by me. This note will not be viewable in [address]

## 2018-11-25 NOTE — DISCHARGE INSTRUCTIONS

## 2018-11-25 NOTE — ED TRIAGE NOTES
Assumed care of this patient. She is alert and oriented x4. Patient presents to ED via EMS with c/o right shoulder pain and lower back pain s/p fall last night around midnight. Patient states that she tripped and fell last night and then scooted herself to another room, where she stayed sitting up and leaning against the bed until someone found her this morning. No deformity noted to the right shoulder.

## 2018-11-25 NOTE — ED NOTES
Patient discharged by Dr. Angie Watson. Patient provided with discharge instructions Rx and instructions on follow up care. Patient out of ED via wheelchair accompanied by son.

## 2019-01-01 ENCOUNTER — TELEPHONE (OUTPATIENT)
Dept: INTERNAL MEDICINE CLINIC | Age: 84
End: 2019-01-01

## 2019-01-01 RX ORDER — GABAPENTIN 300 MG/1
CAPSULE ORAL
Qty: 540 CAP | Refills: 3 | Status: SHIPPED | OUTPATIENT
Start: 2019-01-01

## 2019-02-21 NOTE — TELEPHONE ENCOUNTER
#[phone] Simone Dacosta states pt has a uti and needs to know if you want to call something in or send home health out to them? Pt is confused, dizzy and has had a small fall in which she is ok from that. Please call Simone Dacosta as soon as possible.

## 2019-02-21 NOTE — TELEPHONE ENCOUNTER
HIPAA verified with Tania Llamas, phone was busy unable to leave voice message. Friday, February 22, 2019 02:30 PM  scheduled with Dr. Idalia Donovan, appointment needs to be confirmed.

## 2019-02-22 NOTE — TELEPHONE ENCOUNTER
HIPAA verified with Jamaal Sleek and Myriam Locks was made aware of POC U/A and to  Take patient to ED if symptoms worsen and to keep patient hydrated. U/A was negative.